# Patient Record
Sex: FEMALE | Race: WHITE | NOT HISPANIC OR LATINO | URBAN - METROPOLITAN AREA
[De-identification: names, ages, dates, MRNs, and addresses within clinical notes are randomized per-mention and may not be internally consistent; named-entity substitution may affect disease eponyms.]

---

## 2020-01-20 ENCOUNTER — EMERGENCY (EMERGENCY)
Facility: HOSPITAL | Age: 30
LOS: 1 days | Discharge: HOME | End: 2020-01-20
Attending: INTERNAL MEDICINE | Admitting: STUDENT IN AN ORGANIZED HEALTH CARE EDUCATION/TRAINING PROGRAM
Payer: MEDICAID

## 2020-01-20 VITALS
SYSTOLIC BLOOD PRESSURE: 131 MMHG | WEIGHT: 210.1 LBS | TEMPERATURE: 98 F | RESPIRATION RATE: 18 BRPM | HEART RATE: 86 BPM | HEIGHT: 62 IN | OXYGEN SATURATION: 99 % | DIASTOLIC BLOOD PRESSURE: 80 MMHG

## 2020-01-20 DIAGNOSIS — Z90.49 ACQUIRED ABSENCE OF OTHER SPECIFIED PARTS OF DIGESTIVE TRACT: Chronic | ICD-10-CM

## 2020-01-20 LAB
ALBUMIN SERPL ELPH-MCNC: 3.9 G/DL — SIGNIFICANT CHANGE UP (ref 3.3–5)
ALP SERPL-CCNC: 44 U/L — SIGNIFICANT CHANGE UP (ref 40–120)
ALT FLD-CCNC: 40 U/L — SIGNIFICANT CHANGE UP (ref 10–45)
ANION GAP SERPL CALC-SCNC: 11 MMOL/L — SIGNIFICANT CHANGE UP (ref 5–17)
AST SERPL-CCNC: 52 U/L — HIGH (ref 10–40)
BASOPHILS # BLD AUTO: 0.01 K/UL — SIGNIFICANT CHANGE UP (ref 0–0.2)
BASOPHILS NFR BLD AUTO: 0.1 % — SIGNIFICANT CHANGE UP (ref 0–2)
BILIRUB SERPL-MCNC: 0.8 MG/DL — SIGNIFICANT CHANGE UP (ref 0.2–1.2)
BUN SERPL-MCNC: 10 MG/DL — SIGNIFICANT CHANGE UP (ref 7–23)
CALCIUM SERPL-MCNC: 8.6 MG/DL — SIGNIFICANT CHANGE UP (ref 8.4–10.5)
CHLORIDE SERPL-SCNC: 104 MMOL/L — SIGNIFICANT CHANGE UP (ref 96–108)
CO2 SERPL-SCNC: 22 MMOL/L — SIGNIFICANT CHANGE UP (ref 22–31)
CREAT SERPL-MCNC: 0.82 MG/DL — SIGNIFICANT CHANGE UP (ref 0.5–1.3)
EOSINOPHIL # BLD AUTO: 0 K/UL — SIGNIFICANT CHANGE UP (ref 0–0.5)
EOSINOPHIL NFR BLD AUTO: 0 % — SIGNIFICANT CHANGE UP (ref 0–6)
GLUCOSE SERPL-MCNC: 123 MG/DL — HIGH (ref 70–99)
HCT VFR BLD CALC: 44.7 % — SIGNIFICANT CHANGE UP (ref 34.5–45)
HGB BLD-MCNC: 15.7 G/DL — HIGH (ref 11.5–15.5)
IMM GRANULOCYTES NFR BLD AUTO: 0.4 % — SIGNIFICANT CHANGE UP (ref 0–1.5)
LYMPHOCYTES # BLD AUTO: 0.68 K/UL — LOW (ref 1–3.3)
LYMPHOCYTES # BLD AUTO: 7.5 % — LOW (ref 13–44)
MCHC RBC-ENTMCNC: 32.2 PG — SIGNIFICANT CHANGE UP (ref 27–34)
MCHC RBC-ENTMCNC: 35.1 GM/DL — SIGNIFICANT CHANGE UP (ref 32–36)
MCV RBC AUTO: 91.6 FL — SIGNIFICANT CHANGE UP (ref 80–100)
MONOCYTES # BLD AUTO: 0.05 K/UL — SIGNIFICANT CHANGE UP (ref 0–0.9)
MONOCYTES NFR BLD AUTO: 0.5 % — LOW (ref 2–14)
NEUTROPHILS # BLD AUTO: 8.32 K/UL — HIGH (ref 1.8–7.4)
NEUTROPHILS NFR BLD AUTO: 91.5 % — HIGH (ref 43–77)
NRBC # BLD: 0 /100 WBCS — SIGNIFICANT CHANGE UP (ref 0–0)
PLATELET # BLD AUTO: 191 K/UL — SIGNIFICANT CHANGE UP (ref 150–400)
POTASSIUM SERPL-MCNC: 4.1 MMOL/L — SIGNIFICANT CHANGE UP (ref 3.5–5.3)
POTASSIUM SERPL-SCNC: 4.1 MMOL/L — SIGNIFICANT CHANGE UP (ref 3.5–5.3)
PROT SERPL-MCNC: 7.3 G/DL — SIGNIFICANT CHANGE UP (ref 6–8.3)
RBC # BLD: 4.88 M/UL — SIGNIFICANT CHANGE UP (ref 3.8–5.2)
RBC # FLD: 11.8 % — SIGNIFICANT CHANGE UP (ref 10.3–14.5)
SODIUM SERPL-SCNC: 137 MMOL/L — SIGNIFICANT CHANGE UP (ref 135–145)
WBC # BLD: 9.1 K/UL — SIGNIFICANT CHANGE UP (ref 3.8–10.5)
WBC # FLD AUTO: 9.1 K/UL — SIGNIFICANT CHANGE UP (ref 3.8–10.5)

## 2020-01-20 PROCEDURE — 99285 EMERGENCY DEPT VISIT HI MDM: CPT

## 2020-01-20 PROCEDURE — 99218: CPT

## 2020-01-20 PROCEDURE — 72131 CT LUMBAR SPINE W/O DYE: CPT | Mod: 26

## 2020-01-20 PROCEDURE — 93010 ELECTROCARDIOGRAM REPORT: CPT

## 2020-01-20 PROCEDURE — 72110 X-RAY EXAM L-2 SPINE 4/>VWS: CPT | Mod: 26

## 2020-01-20 RX ORDER — ACETAMINOPHEN 500 MG
650 TABLET ORAL EVERY 6 HOURS
Refills: 0 | Status: DISCONTINUED | OUTPATIENT
Start: 2020-01-20 | End: 2020-02-06

## 2020-01-20 RX ORDER — LIDOCAINE 4 G/100G
1 CREAM TOPICAL ONCE
Refills: 0 | Status: COMPLETED | OUTPATIENT
Start: 2020-01-20 | End: 2020-01-20

## 2020-01-20 RX ORDER — TRAMADOL HYDROCHLORIDE 50 MG/1
50 TABLET ORAL EVERY 4 HOURS
Refills: 0 | Status: DISCONTINUED | OUTPATIENT
Start: 2020-01-20 | End: 2020-01-21

## 2020-01-20 RX ORDER — POLYETHYLENE GLYCOL 3350 17 G/17G
17 POWDER, FOR SOLUTION ORAL DAILY
Refills: 0 | Status: DISCONTINUED | OUTPATIENT
Start: 2020-01-20 | End: 2020-02-06

## 2020-01-20 RX ORDER — SODIUM CHLORIDE 9 MG/ML
1000 INJECTION INTRAMUSCULAR; INTRAVENOUS; SUBCUTANEOUS ONCE
Refills: 0 | Status: COMPLETED | OUTPATIENT
Start: 2020-01-20 | End: 2020-01-20

## 2020-01-20 RX ORDER — LACTULOSE 10 G/15ML
30 SOLUTION ORAL
Qty: 210 | Refills: 0
Start: 2020-01-20 | End: 2020-01-26

## 2020-01-20 RX ORDER — DIAZEPAM 5 MG
1 TABLET ORAL
Qty: 9 | Refills: 0
Start: 2020-01-20 | End: 2020-01-22

## 2020-01-20 RX ORDER — CYCLOBENZAPRINE HYDROCHLORIDE 10 MG/1
10 TABLET, FILM COATED ORAL THREE TIMES A DAY
Refills: 0 | Status: DISCONTINUED | OUTPATIENT
Start: 2020-01-20 | End: 2020-02-06

## 2020-01-20 RX ORDER — KETOROLAC TROMETHAMINE 30 MG/ML
30 SYRINGE (ML) INJECTION ONCE
Refills: 0 | Status: DISCONTINUED | OUTPATIENT
Start: 2020-01-20 | End: 2020-01-20

## 2020-01-20 RX ORDER — LIDOCAINE 4 G/100G
1 CREAM TOPICAL DAILY
Refills: 0 | Status: DISCONTINUED | OUTPATIENT
Start: 2020-01-20 | End: 2020-02-06

## 2020-01-20 RX ORDER — OXYCODONE AND ACETAMINOPHEN 5; 325 MG/1; MG/1
1 TABLET ORAL
Qty: 9 | Refills: 0
Start: 2020-01-20 | End: 2020-01-22

## 2020-01-20 RX ORDER — OXYCODONE AND ACETAMINOPHEN 5; 325 MG/1; MG/1
2 TABLET ORAL ONCE
Refills: 0 | Status: DISCONTINUED | OUTPATIENT
Start: 2020-01-20 | End: 2020-01-20

## 2020-01-20 RX ORDER — DEXAMETHASONE 0.5 MG/5ML
10 ELIXIR ORAL ONCE
Refills: 0 | Status: COMPLETED | OUTPATIENT
Start: 2020-01-20 | End: 2020-01-20

## 2020-01-20 RX ORDER — FENTANYL CITRATE 50 UG/ML
1 INJECTION INTRAVENOUS
Refills: 0 | Status: COMPLETED | OUTPATIENT
Start: 2020-01-20 | End: 2020-01-27

## 2020-01-20 RX ORDER — ENOXAPARIN SODIUM 100 MG/ML
40 INJECTION SUBCUTANEOUS DAILY
Refills: 0 | Status: DISCONTINUED | OUTPATIENT
Start: 2020-01-20 | End: 2020-02-06

## 2020-01-20 RX ORDER — HYDROMORPHONE HYDROCHLORIDE 2 MG/ML
1 INJECTION INTRAMUSCULAR; INTRAVENOUS; SUBCUTANEOUS ONCE
Refills: 0 | Status: DISCONTINUED | OUTPATIENT
Start: 2020-01-20 | End: 2020-01-20

## 2020-01-20 RX ORDER — DIAZEPAM 5 MG
5 TABLET ORAL ONCE
Refills: 0 | Status: DISCONTINUED | OUTPATIENT
Start: 2020-01-20 | End: 2020-01-20

## 2020-01-20 RX ORDER — SENNA PLUS 8.6 MG/1
2 TABLET ORAL AT BEDTIME
Refills: 0 | Status: DISCONTINUED | OUTPATIENT
Start: 2020-01-20 | End: 2020-02-06

## 2020-01-20 RX ORDER — MORPHINE SULFATE 50 MG/1
2 CAPSULE, EXTENDED RELEASE ORAL EVERY 4 HOURS
Refills: 0 | Status: DISCONTINUED | OUTPATIENT
Start: 2020-01-20 | End: 2020-01-20

## 2020-01-20 RX ORDER — PANTOPRAZOLE SODIUM 20 MG/1
40 TABLET, DELAYED RELEASE ORAL
Refills: 0 | Status: DISCONTINUED | OUTPATIENT
Start: 2020-01-20 | End: 2020-02-06

## 2020-01-20 RX ADMIN — CYCLOBENZAPRINE HYDROCHLORIDE 10 MILLIGRAM(S): 10 TABLET, FILM COATED ORAL at 22:50

## 2020-01-20 RX ADMIN — HYDROMORPHONE HYDROCHLORIDE 1 MILLIGRAM(S): 2 INJECTION INTRAMUSCULAR; INTRAVENOUS; SUBCUTANEOUS at 17:17

## 2020-01-20 RX ADMIN — Medication 30 MILLIGRAM(S): at 19:01

## 2020-01-20 RX ADMIN — LIDOCAINE 1 PATCH: 4 CREAM TOPICAL at 19:54

## 2020-01-20 RX ADMIN — LIDOCAINE 1 PATCH: 4 CREAM TOPICAL at 14:09

## 2020-01-20 RX ADMIN — FENTANYL CITRATE 1 PATCH: 50 INJECTION INTRAVENOUS at 21:31

## 2020-01-20 RX ADMIN — SODIUM CHLORIDE 1000 MILLILITER(S): 9 INJECTION INTRAMUSCULAR; INTRAVENOUS; SUBCUTANEOUS at 19:42

## 2020-01-20 RX ADMIN — HYDROMORPHONE HYDROCHLORIDE 1 MILLIGRAM(S): 2 INJECTION INTRAMUSCULAR; INTRAVENOUS; SUBCUTANEOUS at 16:53

## 2020-01-20 RX ADMIN — OXYCODONE AND ACETAMINOPHEN 2 TABLET(S): 5; 325 TABLET ORAL at 14:29

## 2020-01-20 RX ADMIN — Medication 5 MILLIGRAM(S): at 14:08

## 2020-01-20 RX ADMIN — Medication 10 MILLIGRAM(S): at 14:07

## 2020-01-20 RX ADMIN — SENNA PLUS 2 TABLET(S): 8.6 TABLET ORAL at 22:49

## 2020-01-20 RX ADMIN — OXYCODONE AND ACETAMINOPHEN 2 TABLET(S): 5; 325 TABLET ORAL at 15:02

## 2020-01-20 RX ADMIN — Medication 30 MILLIGRAM(S): at 18:31

## 2020-01-20 RX ADMIN — Medication 24 MILLIGRAM(S): at 22:49

## 2020-01-20 RX ADMIN — Medication 5 MILLIGRAM(S): at 16:03

## 2020-01-20 NOTE — ED ADULT NURSE NOTE - OBJECTIVE STATEMENT
Pt presents to ED from home with c/o right mid back pain that radiates down her right leg that started last night at 9pm. Pt states this has happened to her in the past, unsure of how it began. 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 She said she was opening a bottle last night and that's when she felt the pain. She reports tingling in her right leg, denies numbness. She is unable to ambulate, but can move the right leg. She took a total of 1600mg of advil today, last dose of 800mg at 1200.

## 2020-01-20 NOTE — ED PROVIDER NOTE - CLINICAL SUMMARY MEDICAL DECISION MAKING FREE TEXT BOX
29 y.o F with PMH of right sided sciatica presents to the ED with c/o right sided back pain radiating down the posterior right leg x last night. 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 Reports last night she felt a spasm in the lower back, this morning any movement causes pain. She took 1600mg of aleve so far today with no relief in pain. She denies direct trauma, bowel/bladder incontinence, abd pain, n/v, f/c, urinary symptoms or all other complaints. PE as noted above. lumbar xray pending, pain control, reassess 29 y.o F with PMH of right sided sciatica presents to the ED with c/o right sided back pain radiating down the posterior right leg x last night. 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 Reports last night she felt a spasm in the lower back, this morning any movement causes pain. She took 1600mg of aleve so far today with no relief in pain. She denies direct trauma, bowel/bladder incontinence, abd pain, n/v, f/c, urinary symptoms or all other complaints. PE as noted above. lumbar xray negative. pain control, reassess

## 2020-01-20 NOTE — H&P ADULT - ASSESSMENT
29 F with acute on chronic R lumbar radiculopathy.     # R lumbar radiculopathy.   pain control.   trial of fentanyl patch 25mcg/72hrs.   Tramadol for moderate pain.   IV morphine if severe pain.   bowel regimen.   neuro consult.   DVT/GI proph.   cont home OCP. 29 F with acute on chronic R lumbar radiculopathy.     # R lumbar radiculopathy.   pain control.   trial of fentanyl patch 25mcg/72hrs.   Tramadol for moderate pain.   IV morphine if severe pain.   bowel regimen.   Medrol dose arsalan, muscle relaxer.   neuro consult.   DVT/GI proph.   cont home OCP. 29 F with acute on chronic R lumbar radiculopathy.     # R lumbar radiculopathy.   check CT lumbar spine  pain control.   trial of fentanyl patch 25mcg/72hrs.   Tramadol for moderate pain.   IV morphine if severe pain.   bowel regimen.   Medrol dose arsalan, muscle relaxer.   neuro consult.   DVT/GI proph.   cont home OCP.

## 2020-01-20 NOTE — ED PROVIDER NOTE - OBJECTIVE STATEMENT
29 y.o F with PMH of right sided sciatica presents to the ED with c/o right sided back pain radiating down the posterior right leg x last night. 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 Reports last night she felt a spasm in the lower back, this morning any movement causes pain. She took 1600mg of aleve so far today with no relief in pain. She denies direct trauma, bowel/bladder incontinence, abd pain, n/v, f/c, urinary symptoms or all other complaints

## 2020-01-20 NOTE — H&P ADULT - NSHPPHYSICALEXAM_GEN_ALL_CORE
Vital Signs Last 24 Hrs  T(C): 36.9 (20 Jan 2020 20:14), Max: 37 (20 Jan 2020 15:52)  T(F): 98.4 (20 Jan 2020 20:14), Max: 98.6 (20 Jan 2020 15:52)  HR: 70 (20 Jan 2020 20:14) (63 - 86)  BP: 116/68 (20 Jan 2020 20:14) (113/69 - 131/80)  BP(mean): --  RR: 16 (20 Jan 2020 20:14) (16 - 18)  SpO2: 97% (20 Jan 2020 20:14) (97% - 99%)  Daily Height in cm: 157.48 (20 Jan 2020 13:29)    Daily   CAPILLARY BLOOD GLUCOSE        I&O's Summary      GENERAL: NAD  HEAD:  Normocephalic  EYES: EOMI, PERRLA, conjunctiva and sclera clear  ENMT: No tonsillar erythema, exudates, or enlargement; Moist mucous membranes, No lesions  NECK: Supple, No JVD, no bruit, normal thyroid  NERVOUS SYSTEM:  Alert & Oriented X3, Good concentration; grossly  moves all fours. + R straight leg raise. 5/5 R ankle dorsi and plantar flexion. altered sensory on R anteromedial thigh and R lateral calf and R dorsolateral foot.   CHEST/LUNG: Clear to auscultation bilaterally; No rales, rhonchi, wheezing, or rubs  HEART: Regular rate and rhythm; No murmurs, rubs, or gallops  ABDOMEN: Soft, Nontender, Nondistended; Bowel sounds present  EXTREMITIES:  2+ Peripheral Pulses, No clubbing, cyanosis, or edema  LYMPH: No lymphadenopathy noted  SKIN: No rashes or lesions

## 2020-01-20 NOTE — ED PROVIDER NOTE - ATTENDING CONTRIBUTION TO CARE
29 y.o F with PMH of right sided sciatica presents to the ED with c/o right sided back pain radiating down the posterior right leg x last night. 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 Reports last night she felt a spasm in the lower back, this morning any movement causes pain. She took 1600mg of aleve so far today with no relief in pain. She denies direct trauma, bowel/bladder incontinence, abd pain, n/v, f/c, urinary symptoms or all other complaints. PE as noted above. lumbar xray negative. pain control, reassess  Dr. Hawkins:  I have reviewed and discussed with the PA/ resident the case specifics, including the history, physical assessment, evaluation, conclusion, laboratory results, and medical plan. I agree with the contents, and conclusions. I have personally examined, and interviewed the patient.

## 2020-01-20 NOTE — ED ADULT NURSE REASSESSMENT NOTE - NS ED NURSE REASSESS COMMENT FT1
Attempted to sit patient up in bed, pt crying in pain. TAMIKO Hernandez made aware. Repositioned for comfort.

## 2020-01-20 NOTE — ED ADULT NURSE REASSESSMENT NOTE - NS ED NURSE REASSESS COMMENT FT1
Pt still unable to sit or stand without pain. She does report no pain when laying flat, Dr Hawkins aware.

## 2020-01-20 NOTE — H&P ADULT - NSHPLABSRESULTS_GEN_ALL_CORE
15.7   9.10  )-----------( 191      ( 20 Jan 2020 19:30 )             44.7       01-20    137  |  104  |  10  ----------------------------<  123<H>  4.1   |  22  |  0.82    Ca    8.6      20 Jan 2020 19:30    TPro  7.3  /  Alb  3.9  /  TBili  0.8  /  DBili  x   /  AST  52<H>  /  ALT  40  /  AlkPhos  44  01-20         LIVER FUNCTIONS - ( 20 Jan 2020 19:30 )  Alb: 3.9 g/dL / Pro: 7.3 g/dL / ALK PHOS: 44 U/L / ALT: 40 U/L / AST: 52 U/L / GGT: x                   EKG: NSR at 63bpm, no st changes.       < from: Xray Lumbosacral Spine (01.20.20 @ 15:17) >    INTERPRETATION:  Lumbar spine with full pelvic view. Patient has low back pain. 4 images.    Lumbar curve is within normal limits. Vertebral and disc heights are well-preserved.    Lumbar spine and hips. Essentially free of degeneration.    No bone destruction or fracture is evident.    IMPRESSION: Unremarkable study.    < end of copied text >

## 2020-01-20 NOTE — CHART NOTE - NSCHARTNOTEFT_GEN_A_CORE
EXAM: CT LUMBAR SPINE     PROCEDURE DATE: 01/20/2020       INTERPRETATION:     CT lumbar spine without IV contrast     CLINICAL INFORMATION: Low Back pain, difficulty walking.     TECHNIQUE: Contiguous axial 2 mm sections were obtained through the lumbar   spine using a single helical acquisition. Additional 2 mm sagittal and   coronal reconstructions of the spine were obtained. These additional   reformatted images were used to evaluate the spine for alignment, vertebral   fractures and the integrity of the the posterior elements. This scan was   performed using automatic exposure control (radiation dose reduction   software) to obtain a diagnostic image quality scan with patient dose as low   as reasonably achievable.     FINDINGS: No prior similar studies are available for review     Lumbar vertebral body heights are maintained. No vertebral fracture is seen.   No destructive bone lesion is found. Alignment is preserved. Facet joints   appear aligned. The visualized sacral and pelvic bones appear intact.     Lumbar intervertebral disc spaces appear intact. Mild bulges noted at L4-5   thecal sac as well as a RIGHT paracentral extruded disc herniation measuring   1.4 cm which compresses the descending RIGHT L5 nerve root. Resulting   moderate central stenosis is noted at this level.     No paraspinal mass is recognized. Paraspinal soft tissues appear intact.       IMPRESSION: Mild bulges noted at L4-5 thecal sac as well as a RIGHT   paracentral extruded disc herniation measuring 1.4 cm which compresses the   descending RIGHT L5 nerve root. Resulting moderate central stenosis is noted   at this level.    No vertebral fracture is recognized.                       URIEL BARR M.D., ATTENDING RADIOLOGIST   This document has been electronically signed. Jan 20 2020 9:02PM

## 2020-01-20 NOTE — ED PROVIDER NOTE - PHYSICAL EXAMINATION
msk: no sensory or motor deficits on exam. +right SLR. +reproducible right para-lumbar muscle TTP. No midline spinal TTP

## 2020-01-20 NOTE — ED PROVIDER NOTE - CARE PLAN
Principal Discharge DX:	Acute right-sided low back pain with right-sided sciatica Principal Discharge DX:	Acute right-sided low back pain with right-sided sciatica  Secondary Diagnosis:	Ambulatory dysfunction

## 2020-01-20 NOTE — ED ADULT NURSE NOTE - NSIMPLEMENTINTERV_GEN_ALL_ED
Implemented All Fall with Harm Risk Interventions:  [place] to call system. Call bell, personal items and telephone within reach. Instruct patient to call for assistance. Room bathroom lighting operational. Non-slip footwear when patient is off stretcher. Physically safe environment: no spills, clutter or unnecessary equipment. Stretcher in lowest position, wheels locked, appropriate side rails in place. Provide visual cue, wrist band, yellow gown, etc. Monitor gait and stability. Monitor for mental status changes and reorient to person, place, and time. Review medications for side effects contributing to fall risk. Reinforce activity limits and safety measures with patient and family. Provide visual clues: red socks.

## 2020-01-20 NOTE — ED PROVIDER NOTE - NSFOLLOWUPINSTRUCTIONS_ED_ALL_ED_FT
1. Valium 5mg every 8 hours as needed for pain. Do not drink alcohol or drive on this med  2. Ibuprofen 400mg every 6 hours on full stomach as needed for pain  3. Lidocaine patches as needed for pain  4. Follow up with your primary care doctor in 1-2 days  5. Return to the ED for worsening pain, new numbness or tingling in legs, problems moving bowel or bladder or any concerns    Back Pain    Back pain is very common in adults. The cause of back pain is rarely dangerous and the pain often gets better over time. The cause of your back pain may not be known and may include strain of muscles or ligaments, degeneration of the spinal disks, or arthritis. Occasionally the pain may radiate down your leg(s). Over-the-counter medicines to reduce pain and inflammation are often the most helpful. Stretching and remaining active frequently helps the healing process.     SEEK IMMEDIATE MEDICAL CARE IF YOU HAVE ANY OF THE FOLLOWING SYMPTOMS: bowel or bladder control problems, unusual weakness or numbness in your arms or legs, nausea or vomiting, abdominal pain, fever, dizziness/lightheadedness.

## 2020-01-20 NOTE — ED ADULT TRIAGE NOTE - CHIEF COMPLAINT QUOTE
Pt c/o lower back pain radiating down right leg starting last night after uncorking a bottle. Pt states inability to walk. Pt able to transfer from private vehicle to stretcher. Pt states took 1600 mg Ibuprofen today without relief.

## 2020-01-20 NOTE — H&P ADULT - HISTORY OF PRESENT ILLNESS
29 F no sigPMHx pw R lumbar radiculopathy. Pt recalled a milder episode in 10/2019 that resolved after several weeks. She presented with acute onset of severe R lower lumbar back pain with radiation down R thigh to outside of R calf associated with severe muscle spasms and paresthesias down the R leg while trying to uncork a bottle last night in an awkward position. She took some ibuprofen last night and went to bed but this am woke up with severe pain and was unable to get out of bed. After taking a tot of 1600mg ibuprofen, she was assisted out of bed and came to ED. She had received IM dexamethasone 10mg, Dilaudid 1mg, 2 Percocet 5/325, total of 10mg Valium, toradol in ED for the last 6 hrs but still intractable pain. L/S Xray negative. She denied any fevers, chills, bowel or bladder issues. She has some weakness in the R leg and does not feel steady when walking.     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

## 2020-01-20 NOTE — ED PROVIDER NOTE - PROGRESS NOTE DETAILS
TAMIKO Jaeger: Patient's pain is not controlled, her back starts to spasm when she sits, unable to ambulate. Spoke with hospitalist, will admit to obs and obtain lumbar CT

## 2020-01-20 NOTE — H&P ADULT - NSHPREVIEWOFSYSTEMS_GEN_ALL_CORE
CONSTITUTIONAL: No fever, weight loss, or fatigue  EYES: No eye pain, visual disturbances, or discharge  ENMT:  No difficulty hearing, tinnitus, vertigo; No sinus or throat pain  NECK: No pain or stiffness  RESPIRATORY: No cough, wheezing, chills or hemoptysis; No shortness of breath  CARDIOVASCULAR: No chest pain, palpitations, dizziness, or leg swelling  GASTROINTESTINAL: No abdominal or epigastric pain. No nausea, vomiting, or hematemesis; No diarrhea or constipation. No melena or hematochezia.  GENITOURINARY: No dysuria, frequency, hematuria, or incontinence  NEUROLOGICAL: No headaches, memory loss, loss of strength, numbness, or tremors. as per HPI  SKIN: No itching, burning, rashes, or lesions   LYMPH NODES: No enlarged glands  ENDOCRINE: No heat or cold intolerance; No hair loss  MUSCULOSKELETAL: as per HPI  PSYCHIATRIC: No depression, anxiety, mood swings, or difficulty sleeping  HEME/LYMPH: No easy bruising, or bleeding gums  ALLERY AND IMMUNOLOGIC: No hives or eczema    IMPROVE VTE Individual Risk Assessment          RISK                                                          Points  [  ] Previous VTE                                                3  [  ] Thrombophilia                                             2  [  ] Lower limb paralysis                                   2        (unable to hold up >15 seconds)    [  ] Current Cancer                                             2         (within 6 months)  [  ] Immobilization > 24 hrs                              1  [  ] ICU/CCU stay > 24 hours                             1  [  ] Age > 60                                                         1    IMPROVE VTE Score:         [     0    ]    Total Risk Factor Score:    0 - 1:   Consider IPC  >2 - 3:  Thromboprophylaxis required (enoxaparin or SQ heparin)        >4:   High Risk: Thromboprophylaxis required (enoxaparin or SQ heparin), optional add IPC  **If CONTRAINDICATION to enoxaparin or SQ heparin, USE IPCs**

## 2020-01-21 ENCOUNTER — TRANSCRIPTION ENCOUNTER (OUTPATIENT)
Age: 30
End: 2020-01-21

## 2020-01-21 ENCOUNTER — APPOINTMENT (OUTPATIENT)
Dept: MRI IMAGING | Facility: HOSPITAL | Age: 30
End: 2020-01-21

## 2020-01-21 PROCEDURE — G0378: CPT

## 2020-01-21 PROCEDURE — 96374 THER/PROPH/DIAG INJ IV PUSH: CPT

## 2020-01-21 PROCEDURE — 99284 EMERGENCY DEPT VISIT MOD MDM: CPT

## 2020-01-21 PROCEDURE — 85027 COMPLETE CBC AUTOMATED: CPT

## 2020-01-21 PROCEDURE — 80053 COMPREHEN METABOLIC PANEL: CPT

## 2020-01-21 PROCEDURE — 93005 ELECTROCARDIOGRAM TRACING: CPT

## 2020-01-21 PROCEDURE — 72131 CT LUMBAR SPINE W/O DYE: CPT

## 2020-01-21 PROCEDURE — 72148 MRI LUMBAR SPINE W/O DYE: CPT

## 2020-01-21 PROCEDURE — 72148 MRI LUMBAR SPINE W/O DYE: CPT | Mod: 26

## 2020-01-21 PROCEDURE — 96376 TX/PRO/DX INJ SAME DRUG ADON: CPT

## 2020-01-21 PROCEDURE — 99284 EMERGENCY DEPT VISIT MOD MDM: CPT | Mod: 25

## 2020-01-21 PROCEDURE — 99217: CPT

## 2020-01-21 PROCEDURE — 96372 THER/PROPH/DIAG INJ SC/IM: CPT

## 2020-01-21 PROCEDURE — 72110 X-RAY EXAM L-2 SPINE 4/>VWS: CPT

## 2020-01-21 RX ORDER — GABAPENTIN 400 MG/1
300 CAPSULE ORAL THREE TIMES A DAY
Refills: 0 | Status: DISCONTINUED | OUTPATIENT
Start: 2020-01-21 | End: 2020-02-06

## 2020-01-21 RX ORDER — FENTANYL CITRATE 50 UG/ML
1 INJECTION INTRAVENOUS
Qty: 0 | Refills: 0 | DISCHARGE
Start: 2020-01-21

## 2020-01-21 RX ORDER — PANTOPRAZOLE SODIUM 20 MG/1
1 TABLET, DELAYED RELEASE ORAL
Qty: 0 | Refills: 0 | DISCHARGE
Start: 2020-01-21

## 2020-01-21 RX ORDER — SENNA PLUS 8.6 MG/1
2 TABLET ORAL
Qty: 0 | Refills: 0 | DISCHARGE
Start: 2020-01-21

## 2020-01-21 RX ORDER — POLYETHYLENE GLYCOL 3350 17 G/17G
17 POWDER, FOR SOLUTION ORAL
Qty: 0 | Refills: 0 | DISCHARGE
Start: 2020-01-21

## 2020-01-21 RX ORDER — LIDOCAINE 4 G/100G
1 CREAM TOPICAL
Qty: 0 | Refills: 0 | DISCHARGE
Start: 2020-01-21

## 2020-01-21 RX ORDER — ALPRAZOLAM 0.25 MG
0.25 TABLET ORAL ONCE
Refills: 0 | Status: DISCONTINUED | OUTPATIENT
Start: 2020-01-21 | End: 2020-01-21

## 2020-01-21 RX ORDER — ACETAMINOPHEN 500 MG
2 TABLET ORAL
Qty: 0 | Refills: 0 | DISCHARGE
Start: 2020-01-21

## 2020-01-21 RX ORDER — CYCLOBENZAPRINE HYDROCHLORIDE 10 MG/1
1 TABLET, FILM COATED ORAL
Qty: 0 | Refills: 0 | DISCHARGE
Start: 2020-01-21

## 2020-01-21 RX ORDER — TRAMADOL HYDROCHLORIDE 50 MG/1
1 TABLET ORAL
Qty: 0 | Refills: 0 | DISCHARGE
Start: 2020-01-21

## 2020-01-21 RX ORDER — DEXAMETHASONE 0.5 MG/5ML
4 ELIXIR ORAL EVERY 6 HOURS
Refills: 0 | Status: DISCONTINUED | OUTPATIENT
Start: 2020-01-21 | End: 2020-02-06

## 2020-01-21 RX ORDER — GABAPENTIN 400 MG/1
1 CAPSULE ORAL
Qty: 0 | Refills: 0 | DISCHARGE
Start: 2020-01-21

## 2020-01-21 RX ADMIN — FENTANYL CITRATE 1 PATCH: 50 INJECTION INTRAVENOUS at 07:27

## 2020-01-21 RX ADMIN — ENOXAPARIN SODIUM 40 MILLIGRAM(S): 100 INJECTION SUBCUTANEOUS at 16:09

## 2020-01-21 RX ADMIN — GABAPENTIN 300 MILLIGRAM(S): 400 CAPSULE ORAL at 16:09

## 2020-01-21 RX ADMIN — SENNA PLUS 2 TABLET(S): 8.6 TABLET ORAL at 21:30

## 2020-01-21 RX ADMIN — Medication 4 MILLIGRAM(S): at 13:49

## 2020-01-21 RX ADMIN — PANTOPRAZOLE SODIUM 40 MILLIGRAM(S): 20 TABLET, DELAYED RELEASE ORAL at 05:11

## 2020-01-21 RX ADMIN — TRAMADOL HYDROCHLORIDE 50 MILLIGRAM(S): 50 TABLET ORAL at 16:32

## 2020-01-21 RX ADMIN — TRAMADOL HYDROCHLORIDE 50 MILLIGRAM(S): 50 TABLET ORAL at 02:18

## 2020-01-21 RX ADMIN — LIDOCAINE 1 PATCH: 4 CREAM TOPICAL at 16:09

## 2020-01-21 RX ADMIN — CYCLOBENZAPRINE HYDROCHLORIDE 10 MILLIGRAM(S): 10 TABLET, FILM COATED ORAL at 02:18

## 2020-01-21 RX ADMIN — CYCLOBENZAPRINE HYDROCHLORIDE 10 MILLIGRAM(S): 10 TABLET, FILM COATED ORAL at 13:39

## 2020-01-21 RX ADMIN — TRAMADOL HYDROCHLORIDE 50 MILLIGRAM(S): 50 TABLET ORAL at 21:30

## 2020-01-21 RX ADMIN — Medication 4 MILLIGRAM(S): at 05:11

## 2020-01-21 RX ADMIN — FENTANYL CITRATE 1 PATCH: 50 INJECTION INTRAVENOUS at 18:27

## 2020-01-21 RX ADMIN — CYCLOBENZAPRINE HYDROCHLORIDE 10 MILLIGRAM(S): 10 TABLET, FILM COATED ORAL at 16:33

## 2020-01-21 RX ADMIN — Medication 0.25 MILLIGRAM(S): at 13:30

## 2020-01-21 RX ADMIN — Medication 4 MILLIGRAM(S): at 18:24

## 2020-01-21 RX ADMIN — TRAMADOL HYDROCHLORIDE 50 MILLIGRAM(S): 50 TABLET ORAL at 03:00

## 2020-01-21 RX ADMIN — GABAPENTIN 300 MILLIGRAM(S): 400 CAPSULE ORAL at 21:30

## 2020-01-21 RX ADMIN — TRAMADOL HYDROCHLORIDE 50 MILLIGRAM(S): 50 TABLET ORAL at 17:00

## 2020-01-21 NOTE — DISCHARGE NOTE PROVIDER - HOSPITAL COURSE
Hospital Course    29y Female with PMH of  low back pain presents with low Back pain and weakness and numbness down right leg, pt had CT of spine and MRI spine     pt to be transferred to [place] post MRI     Source of Infection:    Antibiotic / Last Day: NA        Palliative Care / Advanced Care Planning    Code Status:full Code    Patient/Family agreeable to Hospice/Palliative (Y/N) NA     Summary of Goals of Care Conversation:NA        Discharging Provider:  Pao Velazquez    Contact Info: Cell [phone] - Please call with any questions or concerns.        Outpatient Provider: Dr Colby Balbuena Hospital Course    29y Female with PMH of  low back pain presents with low Back pain and weakness and numbness down right leg, pt had CT of spine and MRI spine     pt to be transferred to [place] post MRI  for further evalustion and work up     Source of Infection:    Antibiotic / Last Day: NA        Palliative Care / Advanced Care Planning    Code Status:full Code    Patient/Family agreeable to Hospice/Palliative (Y/N) NA     Summary of Goals of Care Conversation:NA        Discharging Provider:  Pao Velazquez    Contact Info: Cell [phone] - Please call with any questions or concerns.        Outpatient Provider: Dr Colby Balbuena Hospital Course    29y Female with PMH of  low back pain presents with low Back pain and weakness and numbness down right leg, pt had CT of spine and MRI spine     pt to be transferred to [place] post MRI  for further evalustion and work up     Source of Infection:    Antibiotic / Last Day: NA        Palliative Care / Advanced Care Planning    Code Status:full Code    Patient/Family agreeable to Hospice/Palliative (Y/N) NA     Summary of Goals of Care Conversation:NA        Discharging Provider:  Pao Velazquez    Contact Info: Cell [phone] - Please call with any questions or concerns.        Outpatient Provider: Dr Colby Balbuena -contacted

## 2020-01-21 NOTE — CONSULT NOTE ADULT - SUBJECTIVE AND OBJECTIVE BOX
29 W who has history of low back pain who went to [facility] in Oct for back pain with radiation. AT the time, no imagine was provided and she was sent for physical therapy. She states her symptoms improved. 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 Yesterday, she had acute worsening of her back pain with pain radiating from the low back to the buttock, to the lateral thigh and mid calf. She has pain that is worse when she stands. She denies any urinary or fecal incontinence.    Vital Signs Last 24 Hrs  T(C): 36.9 (21 Jan 2020 07:04), Max: 37 (20 Jan 2020 15:52)  T(F): 98.5 (21 Jan 2020 07:04), Max: 98.6 (20 Jan 2020 15:52)  HR: 66 (21 Jan 2020 07:04) (63 - 86)  BP: 123/66 (21 Jan 2020 07:04) (113/69 - 139/77)  BP(mean): --  RR: 18 (21 Jan 2020 07:04) (16 - 18)  SpO2: 97% (21 Jan 2020 07:04) (97% - 100%)    Gen: no distress  HEENT: face sym  abd soft  ext no edema    MS: alert, oriented X3 speech fluent language intact.  CN: face sym, tup midline  motor: 5/5 in lower extremity when she is supine. However, there is positive straight leg raise on the right.   sensory intact lt    MEDICATIONS  (STANDING):  dexAMETHasone  Injectable 4 milliGRAM(s) IV Push every 6 hours  enoxaparin Injectable 40 milliGRAM(s) SubCutaneous daily  fentaNYL   Patch  25 MICROgram(s)/Hr 1 Patch Transdermal every 72 hours  gabapentin 300 milliGRAM(s) Oral three times a day  lidocaine   Patch 1 Patch Transdermal daily  pantoprazole    Tablet 40 milliGRAM(s) Oral before breakfast  senna 2 Tablet(s) Oral at bedtime    MEDICATIONS  (PRN):  acetaminophen   Tablet .. 650 milliGRAM(s) Oral every 6 hours PRN Temp greater or equal to 38C (100.4F), Mild Pain (1 - 3)  cyclobenzaprine 10 milliGRAM(s) Oral three times a day PRN Muscle Spasm  morphine  - Injectable 2 milliGRAM(s) IV Push every 4 hours PRN Severe Pain (7 - 10)  polyethylene glycol 3350 17 Gram(s) Oral daily PRN Constipation  traMADol 50 milliGRAM(s) Oral every 4 hours PRN Moderate Pain (4 - 6)      labs reviewed.   IMPRESSION:  Mild bulges noted at L4-5 thecal sac as well as a RIGHT paracentral extruded disc herniation measuring 1.4 cm which compresses the descending RIGHT L5 nerve root. Resulting moderate central stenosis is noted at this level.     No vertebral fracture is recognized.

## 2020-01-21 NOTE — PROGRESS NOTE ADULT - ASSESSMENT
29 F with acute on chronic R lumbar radiculopathy.     # R lumbar radiculopathy.    -CT lumbar spine- as above   -pain control  trial of fentanyl patch 25mcg/72hrs.   Tramadol for moderate pain.   IV morphine if severe pain.   bowel regimen.   Medrol dose arsalan, muscle relaxer.   neuro consult.   ortho consult   DVT/GI proph.   cont home OCP. 29 F with acute on chronic R lumbar radiculopathy. Unable to ambulate     # R lumbar radiculopathy.    -CT lumbar spine- as above   -pain control:  continue  fentanyl patch 25mcg/72hrs,  Tramadol for moderate pain. IV morphine if severe pain.    -medrol dose arsalan, flexeril for spasm    -neuro consult recommands transfer for ortho spine evaluation and possible surgery    -transfer to [place] for ortho spine eval and mri     #Constipation   bowel regimen pt has not had bm in 2 days - miralax 17gm daily   Medrol dose arsalan, muscle relaxer.   neuro consult.   ortho consult     #DVT/GI proph.

## 2020-01-21 NOTE — CONSULT NOTE ADULT - ASSESSMENT
29 W who is here for acute low back pain exacerbation. She has symptoms localizing to the RIght L4-S1 region. Will recommend transfer to [facility] for MRI of back along with neurosurgical consultation. (Neurosurgery is not available at [place]). Case was discussed with Dr. Perry.

## 2020-01-21 NOTE — PROGRESS NOTE ADULT - ATTENDING COMMENTS
I have personally seen and examined patient on the above date.  I discussed the case with Ms. Lea and I agree with findings and plan as detailed per note above, which I have amended where appropriate.

## 2020-01-21 NOTE — DISCHARGE NOTE PROVIDER - NSDCMRMEDTOKEN_GEN_ALL_CORE_FT
Ortho Tri-Cyclen Lo oral tablet: 1 tab(s) orally once a day acetaminophen 325 mg oral tablet: 2 tab(s) orally every 6 hours, As needed, Temp greater or equal to 38C (100.4F), Mild Pain (1 - 3)  cyclobenzaprine 10 mg oral tablet: 1 tab(s) orally 3 times a day, As needed, Muscle Spasm  fentaNYL 25 mcg/hr transdermal film, extended release: 1 patch transdermal every 72 hours  gabapentin 300 mg oral capsule: 1 cap(s) orally 3 times a day  lidocaine 5% topical film: Apply topically to affected area once a day  Ortho Tri-Cyclen Lo oral tablet: 1 tab(s) orally once a day  pantoprazole 40 mg oral delayed release tablet: 1 tab(s) orally once a day (before a meal)  polyethylene glycol 3350 oral powder for reconstitution: 17 gram(s) orally once a day, As needed, Constipation  senna oral tablet: 2 tab(s) orally once a day (at bedtime)  traMADol 50 mg oral tablet: 1 tab(s) orally every 4 hours, As needed, Moderate Pain (4 - 6)

## 2020-01-21 NOTE — DISCHARGE NOTE PROVIDER - NSDCCPCAREPLAN_GEN_ALL_CORE_FT
PRINCIPAL DISCHARGE DIAGNOSIS  Diagnosis: Acute right-sided low back pain with right-sided sciatica  Assessment and Plan of Treatment:       SECONDARY DISCHARGE DIAGNOSES  Diagnosis: Acute right lumbar radiculopathy  Assessment and Plan of Treatment:     Diagnosis: Lumbar radiculopathy, right  Assessment and Plan of Treatment:     Diagnosis: Ambulatory dysfunction  Assessment and Plan of Treatment: PRINCIPAL DISCHARGE DIAGNOSIS  Diagnosis: Acute right-sided low back pain with right-sided sciatica  Assessment and Plan of Treatment: pt getting MRI then Transfer to [place] for spine surgery eval      SECONDARY DISCHARGE DIAGNOSES  Diagnosis: Acute right lumbar radiculopathy  Assessment and Plan of Treatment:     Diagnosis: Lumbar radiculopathy, right  Assessment and Plan of Treatment:     Diagnosis: Ambulatory dysfunction  Assessment and Plan of Treatment:

## 2020-01-21 NOTE — PROGRESS NOTE ADULT - SUBJECTIVE AND OBJECTIVE BOX
Patient is a 29y old  Female who presents with a chief complaint of intractable back pain (20 Jan 2020 20:21)      Patient seen and examined at bedside.    ALLERGIES:  No Known Allergies    MEDICATIONS  (STANDING):  enoxaparin Injectable 40 milliGRAM(s) SubCutaneous daily  fentaNYL   Patch  25 MICROgram(s)/Hr 1 Patch Transdermal every 72 hours  lidocaine   Patch 1 Patch Transdermal daily  methylPREDNISolone   Oral   methylPREDNISolone 4 milliGRAM(s) Oral before breakfast  methylPREDNISolone 4 milliGRAM(s) Oral after lunch  methylPREDNISolone 4 milliGRAM(s) Oral after dinner  methylPREDNISolone 8 milliGRAM(s) Oral at bedtime  pantoprazole    Tablet 40 milliGRAM(s) Oral before breakfast  senna 2 Tablet(s) Oral at bedtime    MEDICATIONS  (PRN):  acetaminophen   Tablet .. 650 milliGRAM(s) Oral every 6 hours PRN Temp greater or equal to 38C (100.4F), Mild Pain (1 - 3)  cyclobenzaprine 10 milliGRAM(s) Oral three times a day PRN Muscle Spasm  morphine  - Injectable 2 milliGRAM(s) IV Push every 4 hours PRN Severe Pain (7 - 10)  polyethylene glycol 3350 17 Gram(s) Oral daily PRN Constipation  traMADol 50 milliGRAM(s) Oral every 4 hours PRN Moderate Pain (4 - 6)    Vital Signs Last 24 Hrs  T(F): 98 (20 Jan 2020 21:15), Max: 98.6 (20 Jan 2020 15:52)  HR: 67 (20 Jan 2020 21:15) (63 - 86)  BP: 139/77 (20 Jan 2020 21:15) (113/69 - 139/77)  RR: 18 (20 Jan 2020 21:15) (16 - 18)  SpO2: 100% (20 Jan 2020 21:15) (97% - 100%)  I&O's Summary    PHYSICAL EXAM:  General: NAD, A/O x 3  ENT: MMM  Neck: Supple, No JVD  Lungs: Clear to auscultation bilaterally, Non labored breathing   Cardio: RRR, S1/S2, No murmurs  Abdomen: Soft, Nontender, Nondistended; Bowel sounds present  Extremities: No calf tenderness, No pitting edema    LABS:                        15.7   9.10  )-----------( 191      ( 20 Jan 2020 19:30 )             44.7     01-20    137  |  104  |  10  ----------------------------<  123  4.1   |  22  |  0.82    Ca    8.6      20 Jan 2020 19:30    TPro  7.3  /  Alb  3.9  /  TBili  0.8  /  DBili  x   /  AST  52  /  ALT  40  /  AlkPhos  44  01-20    eGFR if Non African American: 97 mL/min/1.73M2 (01-20-20 @ 19:30)  eGFR if : 113 mL/min/1.73M2 (01-20-20 @ 19:30)                                  RADIOLOGY & ADDITIONAL TESTS:    Care Discussed with Consultants/Other Providers:     EXAM:  CT LUMBAR SPINE      PROCEDURE DATE:  01/20/2020        INTERPRETATION:      CT lumbar spine without IV contrast        CLINICAL INFORMATION: Low   Back pain, difficulty walking.    TECHNIQUE:  Contiguous axial 2 mm sections were obtained through the lumbar spine using a single helical acquisition.   Additional 2 mm sagittal and coronal reconstructions of the spine were obtained. These additional reformatted images were used to evaluate the spine for alignment, vertebral fractures and the integrity of the the posterior elements.  This scan was performed using automatic exposure control (radiation dose reduction software) to obtain a diagnostic image quality scan with patient dose as low as reasonably achievable.    FINDINGS:   No prior similar studies are available for review    Lumbar vertebral body heights are maintained. No vertebral fracture is seen. No destructive bone lesion is found.  Alignment is preserved.  Facet joints appear aligned.  The visualized sacral and pelvic bones appear intact.    Lumbar intervertebral disc spaces appear intact. Mild bulges noted at L4-5 thecal sac as well as a RIGHT paracentral extruded disc herniation measuring 1.4 cm which compresses the descending RIGHT L5 nerve root. Resulting moderate central stenosis is noted at this level.    No paraspinal mass is recognized.  Paraspinal soft tissues appear intact.      IMPRESSION:  Mild bulges noted at L4-5 thecal sac as well as a RIGHT paracentral extruded disc herniation measuring 1.4 cm which compresses the descending RIGHT L5 nerve root. Resulting moderate central stenosis is noted at this level.     No vertebral fracture is recognized.    URIEL BARR M.D., ATTENDING RADIOLOGIST  This document has been electronically signed. Jan 20 2020  9:02PM Patient is a 29y old  Female who presents with a chief complaint of intractable back pain.  Pt states pain has been improved last pm once pt starts to move pain gets worse with numbness wnd pain down the exterior right leg.      Patient seen and examined at bedside.    ALLERGIES:  No Known Allergies    MEDICATIONS  (STANDING):  enoxaparin Injectable 40 milliGRAM(s) SubCutaneous daily  fentaNYL   Patch  25 MICROgram(s)/Hr 1 Patch Transdermal every 72 hours  lidocaine   Patch 1 Patch Transdermal daily  methylPREDNISolone   Oral   methylPREDNISolone 4 milliGRAM(s) Oral before breakfast  methylPREDNISolone 4 milliGRAM(s) Oral after lunch  methylPREDNISolone 4 milliGRAM(s) Oral after dinner  methylPREDNISolone 8 milliGRAM(s) Oral at bedtime  pantoprazole    Tablet 40 milliGRAM(s) Oral before breakfast  senna 2 Tablet(s) Oral at bedtime    MEDICATIONS  (PRN):  acetaminophen   Tablet .. 650 milliGRAM(s) Oral every 6 hours PRN Temp greater or equal to 38C (100.4F), Mild Pain (1 - 3)  cyclobenzaprine 10 milliGRAM(s) Oral three times a day PRN Muscle Spasm  morphine  - Injectable 2 milliGRAM(s) IV Push every 4 hours PRN Severe Pain (7 - 10)  polyethylene glycol 3350 17 Gram(s) Oral daily PRN Constipation  traMADol 50 milliGRAM(s) Oral every 4 hours PRN Moderate Pain (4 - 6)    Vital Signs Last 24 Hrs  T(F): 98 (20 Jan 2020 21:15), Max: 98.6 (20 Jan 2020 15:52)  HR: 67 (20 Jan 2020 21:15) (63 - 86)  BP: 139/77 (20 Jan 2020 21:15) (113/69 - 139/77)  RR: 18 (20 Jan 2020 21:15) (16 - 18)  SpO2: 100% (20 Jan 2020 21:15) (97% - 100%)  I&O's Summary    PHYSICAL EXAM:  General: NAD, A/O x 3 Obese  ENT: MMM  Neck: Supple, No JVD  Lungs: Clear to auscultation bilaterally, Non labored breathing   Cardio: RRR, S1/S2, No murmurs  Abdomen: Soft, Nontender, Nondistended; Bowel sounds present  Extremities: No calf tenderness, No pitting edema pt with postitive straight leg raise on right with decrease sensation     LABS:                        15.7   9.10  )-----------( 191      ( 20 Jan 2020 19:30 )             44.7     01-20    137  |  104  |  10  ----------------------------<  123  4.1   |  22  |  0.82    Ca    8.6      20 Jan 2020 19:30    TPro  7.3  /  Alb  3.9  /  TBili  0.8  /  DBili  x   /  AST  52  /  ALT  40  /  AlkPhos  44  01-20    eGFR if Non African American: 97 mL/min/1.73M2 (01-20-20 @ 19:30)  eGFR if : 113 mL/min/1.73M2 (01-20-20 @ 19:30)                                  RADIOLOGY & ADDITIONAL TESTS:    Care Discussed with Consultants/Other Providers:     EXAM:  CT LUMBAR SPINE      PROCEDURE DATE:  01/20/2020        INTERPRETATION:      CT lumbar spine without IV contrast        CLINICAL INFORMATION: Low   Back pain, difficulty walking.    TECHNIQUE:  Contiguous axial 2 mm sections were obtained through the lumbar spine using a single helical acquisition.   Additional 2 mm sagittal and coronal reconstructions of the spine were obtained. These additional reformatted images were used to evaluate the spine for alignment, vertebral fractures and the integrity of the the posterior elements.  This scan was performed using automatic exposure control (radiation dose reduction software) to obtain a diagnostic image quality scan with patient dose as low as reasonably achievable.    FINDINGS:   No prior similar studies are available for review    Lumbar vertebral body heights are maintained. No vertebral fracture is seen. No destructive bone lesion is found.  Alignment is preserved.  Facet joints appear aligned.  The visualized sacral and pelvic bones appear intact.    Lumbar intervertebral disc spaces appear intact. Mild bulges noted at L4-5 thecal sac as well as a RIGHT paracentral extruded disc herniation measuring 1.4 cm which compresses the descending RIGHT L5 nerve root. Resulting moderate central stenosis is noted at this level.    No paraspinal mass is recognized.  Paraspinal soft tissues appear intact.      IMPRESSION:  Mild bulges noted at L4-5 thecal sac as well as a RIGHT paracentral extruded disc herniation measuring 1.4 cm which compresses the descending RIGHT L5 nerve root. Resulting moderate central stenosis is noted at this level.     No vertebral fracture is recognized.    URIEL BARR M.D., ATTENDING RADIOLOGIST  This document has been electronically signed. Jan 20 2020  9:02PM Patient is a 29y old  Female who presents with a chief complaint of intractable back pain.  Pt states pain has been improved last pm once pt starts to move pain gets worse with numbness wnd pain down the exterior right leg.      Patient seen and examined at bedside.    ALLERGIES:  No Known Allergies    MEDICATIONS  (STANDING):  enoxaparin Injectable 40 milliGRAM(s) SubCutaneous daily  fentaNYL   Patch  25 MICROgram(s)/Hr 1 Patch Transdermal every 72 hours  lidocaine   Patch 1 Patch Transdermal daily  methylPREDNISolone   Oral   methylPREDNISolone 4 milliGRAM(s) Oral before breakfast  methylPREDNISolone 4 milliGRAM(s) Oral after lunch  methylPREDNISolone 4 milliGRAM(s) Oral after dinner  methylPREDNISolone 8 milliGRAM(s) Oral at bedtime  pantoprazole    Tablet 40 milliGRAM(s) Oral before breakfast  senna 2 Tablet(s) Oral at bedtime    MEDICATIONS  (PRN):  acetaminophen   Tablet .. 650 milliGRAM(s) Oral every 6 hours PRN Temp greater or equal to 38C (100.4F), Mild Pain (1 - 3)  cyclobenzaprine 10 milliGRAM(s) Oral three times a day PRN Muscle Spasm  morphine  - Injectable 2 milliGRAM(s) IV Push every 4 hours PRN Severe Pain (7 - 10)  polyethylene glycol 3350 17 Gram(s) Oral daily PRN Constipation  traMADol 50 milliGRAM(s) Oral every 4 hours PRN Moderate Pain (4 - 6)    Vital Signs Last 24 Hrs  T(F): 98 (20 Jan 2020 21:15), Max: 98.6 (20 Jan 2020 15:52)  HR: 67 (20 Jan 2020 21:15) (63 - 86)  BP: 139/77 (20 Jan 2020 21:15) (113/69 - 139/77)  RR: 18 (20 Jan 2020 21:15) (16 - 18)  SpO2: 100% (20 Jan 2020 21:15) (97% - 100%)  I&O's Summary    PHYSICAL EXAM:  General: NAD, A/O x 3 Obese  ENT: MMM  Neck: Supple, No JVD  Lungs: Clear to auscultation bilaterally, Non labored breathing   Cardio: RRR, S1/S2, No murmurs  Abdomen: Soft, Nontender, Nondistended; Bowel sounds present  Extremities: No calf tenderness, No pitting edema pt with postitive straight leg raise on right with decrease sensation of prone portion of shin    LABS:                        15.7   9.10  )-----------( 191      ( 20 Jan 2020 19:30 )             44.7     01-20    137  |  104  |  10  ----------------------------<  123  4.1   |  22  |  0.82    Ca    8.6      20 Jan 2020 19:30    TPro  7.3  /  Alb  3.9  /  TBili  0.8  /  DBili  x   /  AST  52  /  ALT  40  /  AlkPhos  44  01-20    eGFR if Non African American: 97 mL/min/1.73M2 (01-20-20 @ 19:30)  eGFR if : 113 mL/min/1.73M2 (01-20-20 @ 19:30)                                  RADIOLOGY & ADDITIONAL TESTS:    Care Discussed with Consultants/Other Providers:     EXAM:  CT LUMBAR SPINE      PROCEDURE DATE:  01/20/2020        INTERPRETATION:      CT lumbar spine without IV contrast        CLINICAL INFORMATION: Low   Back pain, difficulty walking.    TECHNIQUE:  Contiguous axial 2 mm sections were obtained through the lumbar spine using a single helical acquisition.   Additional 2 mm sagittal and coronal reconstructions of the spine were obtained. These additional reformatted images were used to evaluate the spine for alignment, vertebral fractures and the integrity of the the posterior elements.  This scan was performed using automatic exposure control (radiation dose reduction software) to obtain a diagnostic image quality scan with patient dose as low as reasonably achievable.    FINDINGS:   No prior similar studies are available for review    Lumbar vertebral body heights are maintained. No vertebral fracture is seen. No destructive bone lesion is found.  Alignment is preserved.  Facet joints appear aligned.  The visualized sacral and pelvic bones appear intact.    Lumbar intervertebral disc spaces appear intact. Mild bulges noted at L4-5 thecal sac as well as a RIGHT paracentral extruded disc herniation measuring 1.4 cm which compresses the descending RIGHT L5 nerve root. Resulting moderate central stenosis is noted at this level.    No paraspinal mass is recognized.  Paraspinal soft tissues appear intact.      IMPRESSION:  Mild bulges noted at L4-5 thecal sac as well as a RIGHT paracentral extruded disc herniation measuring 1.4 cm which compresses the descending RIGHT L5 nerve root. Resulting moderate central stenosis is noted at this level.     No vertebral fracture is recognized.    URIEL BARR M.D., ATTENDING RADIOLOGIST  This document has been electronically signed. Jan 20 2020  9:02PM

## 2020-01-22 ENCOUNTER — INPATIENT (INPATIENT)
Facility: HOSPITAL | Age: 30
LOS: 3 days | Discharge: HOME | DRG: 520 | End: 2020-01-26
Attending: NEUROLOGICAL SURGERY | Admitting: NEUROLOGICAL SURGERY
Payer: MEDICAID

## 2020-01-22 VITALS
RESPIRATION RATE: 16 BRPM | TEMPERATURE: 98 F | OXYGEN SATURATION: 94 % | DIASTOLIC BLOOD PRESSURE: 61 MMHG | SYSTOLIC BLOOD PRESSURE: 126 MMHG | HEART RATE: 63 BPM

## 2020-01-22 VITALS
RESPIRATION RATE: 18 BRPM | OXYGEN SATURATION: 95 % | SYSTOLIC BLOOD PRESSURE: 115 MMHG | HEART RATE: 82 BPM | DIASTOLIC BLOOD PRESSURE: 75 MMHG | TEMPERATURE: 98 F

## 2020-01-22 DIAGNOSIS — Z90.49 ACQUIRED ABSENCE OF OTHER SPECIFIED PARTS OF DIGESTIVE TRACT: Chronic | ICD-10-CM

## 2020-01-22 DIAGNOSIS — G95.20 UNSPECIFIED CORD COMPRESSION: ICD-10-CM

## 2020-01-22 LAB
ALBUMIN SERPL ELPH-MCNC: 3.7 G/DL — SIGNIFICANT CHANGE UP (ref 3.3–5)
ALP SERPL-CCNC: 34 U/L — LOW (ref 40–120)
ALT FLD-CCNC: 31 U/L — SIGNIFICANT CHANGE UP (ref 10–45)
ANION GAP SERPL CALC-SCNC: 12 MMOL/L — SIGNIFICANT CHANGE UP (ref 5–17)
APTT BLD: 24.5 SEC — LOW (ref 27.5–36.3)
AST SERPL-CCNC: 16 U/L — SIGNIFICANT CHANGE UP (ref 10–40)
BASOPHILS # BLD AUTO: 0 K/UL — SIGNIFICANT CHANGE UP (ref 0–0.2)
BASOPHILS NFR BLD AUTO: 0 % — SIGNIFICANT CHANGE UP (ref 0–2)
BILIRUB SERPL-MCNC: 0.2 MG/DL — SIGNIFICANT CHANGE UP (ref 0.2–1.2)
BLD GP AB SCN SERPL QL: NEGATIVE — SIGNIFICANT CHANGE UP
BUN SERPL-MCNC: 15 MG/DL — SIGNIFICANT CHANGE UP (ref 7–23)
CALCIUM SERPL-MCNC: 8.5 MG/DL — SIGNIFICANT CHANGE UP (ref 8.4–10.5)
CHLORIDE SERPL-SCNC: 104 MMOL/L — SIGNIFICANT CHANGE UP (ref 96–108)
CO2 SERPL-SCNC: 22 MMOL/L — SIGNIFICANT CHANGE UP (ref 22–31)
CREAT SERPL-MCNC: 0.68 MG/DL — SIGNIFICANT CHANGE UP (ref 0.5–1.3)
EOSINOPHIL # BLD AUTO: 0 K/UL — SIGNIFICANT CHANGE UP (ref 0–0.5)
EOSINOPHIL NFR BLD AUTO: 0 % — SIGNIFICANT CHANGE UP (ref 0–6)
GLUCOSE SERPL-MCNC: 139 MG/DL — HIGH (ref 70–99)
HCG SERPL-ACNC: <2 MIU/ML — SIGNIFICANT CHANGE UP
HCT VFR BLD CALC: 39.9 % — SIGNIFICANT CHANGE UP (ref 34.5–45)
HGB BLD-MCNC: 13.5 G/DL — SIGNIFICANT CHANGE UP (ref 11.5–15.5)
IMM GRANULOCYTES NFR BLD AUTO: 0.5 % — SIGNIFICANT CHANGE UP (ref 0–1.5)
INR BLD: 1.11 RATIO — SIGNIFICANT CHANGE UP (ref 0.88–1.16)
LYMPHOCYTES # BLD AUTO: 1.07 K/UL — SIGNIFICANT CHANGE UP (ref 1–3.3)
LYMPHOCYTES # BLD AUTO: 9 % — LOW (ref 13–44)
MCHC RBC-ENTMCNC: 31.1 PG — SIGNIFICANT CHANGE UP (ref 27–34)
MCHC RBC-ENTMCNC: 33.8 GM/DL — SIGNIFICANT CHANGE UP (ref 32–36)
MCV RBC AUTO: 91.9 FL — SIGNIFICANT CHANGE UP (ref 80–100)
MONOCYTES # BLD AUTO: 0.6 K/UL — SIGNIFICANT CHANGE UP (ref 0–0.9)
MONOCYTES NFR BLD AUTO: 5.1 % — SIGNIFICANT CHANGE UP (ref 2–14)
NEUTROPHILS # BLD AUTO: 10.12 K/UL — HIGH (ref 1.8–7.4)
NEUTROPHILS NFR BLD AUTO: 85.4 % — HIGH (ref 43–77)
NRBC # BLD: 0 /100 WBCS — SIGNIFICANT CHANGE UP (ref 0–0)
PLATELET # BLD AUTO: 208 K/UL — SIGNIFICANT CHANGE UP (ref 150–400)
POTASSIUM SERPL-MCNC: 4 MMOL/L — SIGNIFICANT CHANGE UP (ref 3.5–5.3)
POTASSIUM SERPL-SCNC: 4 MMOL/L — SIGNIFICANT CHANGE UP (ref 3.5–5.3)
PROT SERPL-MCNC: 6.1 G/DL — SIGNIFICANT CHANGE UP (ref 6–8.3)
PROTHROM AB SERPL-ACNC: 12.7 SEC — SIGNIFICANT CHANGE UP (ref 10–12.9)
RBC # BLD: 4.34 M/UL — SIGNIFICANT CHANGE UP (ref 3.8–5.2)
RBC # FLD: 11.9 % — SIGNIFICANT CHANGE UP (ref 10.3–14.5)
RH IG SCN BLD-IMP: NEGATIVE — SIGNIFICANT CHANGE UP
SODIUM SERPL-SCNC: 138 MMOL/L — SIGNIFICANT CHANGE UP (ref 135–145)
WBC # BLD: 11.85 K/UL — HIGH (ref 3.8–10.5)
WBC # FLD AUTO: 11.85 K/UL — HIGH (ref 3.8–10.5)

## 2020-01-22 PROCEDURE — 93010 ELECTROCARDIOGRAM REPORT: CPT

## 2020-01-22 PROCEDURE — 99232 SBSQ HOSP IP/OBS MODERATE 35: CPT | Mod: GC

## 2020-01-22 PROCEDURE — 99232 SBSQ HOSP IP/OBS MODERATE 35: CPT

## 2020-01-22 RX ORDER — ONDANSETRON 8 MG/1
4 TABLET, FILM COATED ORAL EVERY 6 HOURS
Refills: 0 | Status: DISCONTINUED | OUTPATIENT
Start: 2020-01-22 | End: 2020-01-25

## 2020-01-22 RX ORDER — TRAMADOL HYDROCHLORIDE 50 MG/1
25 TABLET ORAL EVERY 4 HOURS
Refills: 0 | Status: DISCONTINUED | OUTPATIENT
Start: 2020-01-22 | End: 2020-01-25

## 2020-01-22 RX ORDER — TRAMADOL HYDROCHLORIDE 50 MG/1
50 TABLET ORAL EVERY 4 HOURS
Refills: 0 | Status: DISCONTINUED | OUTPATIENT
Start: 2020-01-22 | End: 2020-01-25

## 2020-01-22 RX ORDER — CYCLOBENZAPRINE HYDROCHLORIDE 10 MG/1
5 TABLET, FILM COATED ORAL THREE TIMES A DAY
Refills: 0 | Status: DISCONTINUED | OUTPATIENT
Start: 2020-01-22 | End: 2020-01-25

## 2020-01-22 RX ORDER — SODIUM CHLORIDE 9 MG/ML
1000 INJECTION INTRAMUSCULAR; INTRAVENOUS; SUBCUTANEOUS
Refills: 0 | Status: DISCONTINUED | OUTPATIENT
Start: 2020-01-22 | End: 2020-01-23

## 2020-01-22 RX ORDER — LIDOCAINE 4 G/100G
1 CREAM TOPICAL DAILY
Refills: 0 | Status: DISCONTINUED | OUTPATIENT
Start: 2020-01-22 | End: 2020-01-25

## 2020-01-22 RX ORDER — GABAPENTIN 400 MG/1
300 CAPSULE ORAL THREE TIMES A DAY
Refills: 0 | Status: DISCONTINUED | OUTPATIENT
Start: 2020-01-22 | End: 2020-01-22

## 2020-01-22 RX ORDER — DEXAMETHASONE 0.5 MG/5ML
4 ELIXIR ORAL EVERY 6 HOURS
Refills: 0 | Status: DISCONTINUED | OUTPATIENT
Start: 2020-01-22 | End: 2020-01-24

## 2020-01-22 RX ORDER — GABAPENTIN 400 MG/1
400 CAPSULE ORAL THREE TIMES A DAY
Refills: 0 | Status: DISCONTINUED | OUTPATIENT
Start: 2020-01-22 | End: 2020-01-24

## 2020-01-22 RX ORDER — SENNA PLUS 8.6 MG/1
2 TABLET ORAL AT BEDTIME
Refills: 0 | Status: DISCONTINUED | OUTPATIENT
Start: 2020-01-22 | End: 2020-01-25

## 2020-01-22 RX ORDER — POLYETHYLENE GLYCOL 3350 17 G/17G
17 POWDER, FOR SOLUTION ORAL DAILY
Refills: 0 | Status: DISCONTINUED | OUTPATIENT
Start: 2020-01-22 | End: 2020-01-25

## 2020-01-22 RX ORDER — PANTOPRAZOLE SODIUM 20 MG/1
40 TABLET, DELAYED RELEASE ORAL DAILY
Refills: 0 | Status: DISCONTINUED | OUTPATIENT
Start: 2020-01-22 | End: 2020-01-25

## 2020-01-22 RX ORDER — CYCLOBENZAPRINE HYDROCHLORIDE 10 MG/1
10 TABLET, FILM COATED ORAL THREE TIMES A DAY
Refills: 0 | Status: DISCONTINUED | OUTPATIENT
Start: 2020-01-22 | End: 2020-01-22

## 2020-01-22 RX ORDER — ACETAMINOPHEN 500 MG
650 TABLET ORAL EVERY 6 HOURS
Refills: 0 | Status: DISCONTINUED | OUTPATIENT
Start: 2020-01-22 | End: 2020-01-25

## 2020-01-22 RX ORDER — AMITRIPTYLINE HCL 25 MG
25 TABLET ORAL AT BEDTIME
Refills: 0 | Status: DISCONTINUED | OUTPATIENT
Start: 2020-01-22 | End: 2020-01-25

## 2020-01-22 RX ORDER — ENOXAPARIN SODIUM 100 MG/ML
40 INJECTION SUBCUTANEOUS DAILY
Refills: 0 | Status: DISCONTINUED | OUTPATIENT
Start: 2020-01-22 | End: 2020-01-24

## 2020-01-22 RX ADMIN — CYCLOBENZAPRINE HYDROCHLORIDE 10 MILLIGRAM(S): 10 TABLET, FILM COATED ORAL at 09:36

## 2020-01-22 RX ADMIN — TRAMADOL HYDROCHLORIDE 50 MILLIGRAM(S): 50 TABLET ORAL at 20:38

## 2020-01-22 RX ADMIN — LIDOCAINE 1 PATCH: 4 CREAM TOPICAL at 12:21

## 2020-01-22 RX ADMIN — ENOXAPARIN SODIUM 40 MILLIGRAM(S): 100 INJECTION SUBCUTANEOUS at 18:01

## 2020-01-22 RX ADMIN — TRAMADOL HYDROCHLORIDE 50 MILLIGRAM(S): 50 TABLET ORAL at 21:10

## 2020-01-22 RX ADMIN — Medication 25 MILLIGRAM(S): at 22:14

## 2020-01-22 RX ADMIN — TRAMADOL HYDROCHLORIDE 25 MILLIGRAM(S): 50 TABLET ORAL at 16:39

## 2020-01-22 RX ADMIN — TRAMADOL HYDROCHLORIDE 25 MILLIGRAM(S): 50 TABLET ORAL at 17:10

## 2020-01-22 RX ADMIN — GABAPENTIN 300 MILLIGRAM(S): 400 CAPSULE ORAL at 06:17

## 2020-01-22 RX ADMIN — GABAPENTIN 400 MILLIGRAM(S): 400 CAPSULE ORAL at 21:24

## 2020-01-22 RX ADMIN — CYCLOBENZAPRINE HYDROCHLORIDE 5 MILLIGRAM(S): 10 TABLET, FILM COATED ORAL at 21:25

## 2020-01-22 RX ADMIN — Medication 650 MILLIGRAM(S): at 19:41

## 2020-01-22 RX ADMIN — TRAMADOL HYDROCHLORIDE 50 MILLIGRAM(S): 50 TABLET ORAL at 03:36

## 2020-01-22 RX ADMIN — CYCLOBENZAPRINE HYDROCHLORIDE 10 MILLIGRAM(S): 10 TABLET, FILM COATED ORAL at 03:06

## 2020-01-22 RX ADMIN — Medication 4 MILLIGRAM(S): at 12:21

## 2020-01-22 RX ADMIN — Medication 650 MILLIGRAM(S): at 20:10

## 2020-01-22 RX ADMIN — TRAMADOL HYDROCHLORIDE 50 MILLIGRAM(S): 50 TABLET ORAL at 11:20

## 2020-01-22 RX ADMIN — PANTOPRAZOLE SODIUM 40 MILLIGRAM(S): 20 TABLET, DELAYED RELEASE ORAL at 12:21

## 2020-01-22 RX ADMIN — SODIUM CHLORIDE 75 MILLILITER(S): 9 INJECTION INTRAMUSCULAR; INTRAVENOUS; SUBCUTANEOUS at 06:17

## 2020-01-22 RX ADMIN — Medication 500 MILLIGRAM(S): at 18:35

## 2020-01-22 RX ADMIN — Medication 4 MILLIGRAM(S): at 18:02

## 2020-01-22 RX ADMIN — GABAPENTIN 400 MILLIGRAM(S): 400 CAPSULE ORAL at 13:28

## 2020-01-22 RX ADMIN — Medication 500 MILLIGRAM(S): at 18:02

## 2020-01-22 RX ADMIN — Medication 4 MILLIGRAM(S): at 06:16

## 2020-01-22 RX ADMIN — TRAMADOL HYDROCHLORIDE 50 MILLIGRAM(S): 50 TABLET ORAL at 03:06

## 2020-01-22 RX ADMIN — TRAMADOL HYDROCHLORIDE 50 MILLIGRAM(S): 50 TABLET ORAL at 10:47

## 2020-01-22 RX ADMIN — SENNA PLUS 2 TABLET(S): 8.6 TABLET ORAL at 21:24

## 2020-01-22 RX ADMIN — Medication 1 TABLET(S): at 12:22

## 2020-01-22 RX ADMIN — LIDOCAINE 1 PATCH: 4 CREAM TOPICAL at 19:50

## 2020-01-22 NOTE — H&P ADULT - HISTORY OF PRESENT ILLNESS
29 F no sigPMHx pw R lumbar radiculopathy. Pt recalled a milder episode in 10/2019 that resolved after several weeks. She presented with acute onset of severe R lower lumbar back pain with radiation down R thigh to outside of R calf associated with severe muscle spasms and paresthesias down the R leg while trying to uncork a bottle last night in an awkward position. She took some ibuprofen last night and went to bed but this am woke up with severe pain and was unable to get out of bed. After taking a tot of 1600mg ibuprofen, she was assisted out of bed and came to ED. She had received IM dexamethasone 10mg, Dilaudid 1mg, 2 Percocet 5/325, total of 10mg Valium, toradol in ED for the last 6 hrs but still intractable pain. L/S Xray negative. She denied any fevers, chills, bowel or bladder issues. She has some weakness in the R leg and does not feel steady when walking.     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 29 F no sigPMHx pw R lumbar radiculopathy. Pt recalled a milder episode in 10/2019 that resolved after several weeks. She presented with acute onset of severe R lower lumbar back pain with radiation down R thigh to outside of R calf associated with severe muscle spasms and paresthesias down the R leg while trying to uncork a bottle last night in an awkward position. She took some ibuprofen last night and went to bed but this am woke up with severe pain and was unable to get out of bed. After taking a tot of 1600mg ibuprofen, she was assisted out of bed and came to ED. She had received IM dexamethasone 10mg, Dilaudid 1mg, 2 Percocet 5/325, total of 10mg Valium, toradol in ED for the last 6 hrs but still intractable pain. L/S Xray negative. She denied any fevers, chills, bowel or bladder issues. She has some weakness in the R leg and does not feel steady when walking.     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

## 2020-01-22 NOTE — H&P ADULT - ASSESSMENT
Allison Shi  29F direct admission to floor for L4-5 disc herniation  - NPO, added on for possible discectomy pending further discussion  - pain control  - preop labs / pregnancy test

## 2020-01-22 NOTE — CHART NOTE - NSCHARTNOTEFT_GEN_A_CORE
CAPRINI SCORE [CLOT] Score on Admission for     AGE RELATED RISK FACTORS                                                       MOBILITY RELATED FACTORS  [ ] Age 41-60 years                                            (1 Point)                  [ ] Bed rest                                                        (1 Point)  [ ] Age: 61-74 years                                           (2 Points)                 [ ] Plaster cast                                                   (2 Points)  [ ] Age= 75 years                                              (3 Points)                 [ 2] Bed bound for more than 72 hours                 (2 Points)    DISEASE RELATED RISK FACTORS                                               GENDER SPECIFIC FACTORS  [ ] Edema in the lower extremities                       (1 Point)                  [ ] Pregnancy                                                     (1 Point)  [ ] Varicose veins                                               (1 Point)                  [ ] Post-partum < 6 weeks                                   (1 Point)             [x ] BMI > 25 Kg/m2                                            (1 Point)                  [ ] Hormonal therapy  or oral contraception          (1 Point)                 [ ] Sepsis (in the previous month)                        (1 Point)                  [ ] History of pregnancy complications                 (1 point)  [ ] Pneumonia or serious lung disease                                               [ ] Unexplained or recurrent                     (1 Point)           (in the previous month)                               (1 Point)  [ ] Abnormal pulmonary function test                     (1 Point)                 SURGERY RELATED RISK FACTORS (include planned surgeries)  [ ] Acute myocardial infarction                              (1 Point)                 [ ]  Section                                             (1 Point)  [ ] Congestive heart failure (in the previous month)  (1 Point)         [ ] Minor surgery                                                  (1 Point)   [ ] Inflammatory bowel disease                             (1 Point)                 [ ] Arthroscopic surgery                                        (2 Points)  [ ] Central venous access                                      (2 Points)                [ ] General surgery lasting more than 45 minutes   (2 Points)       [ ] Stroke (in the previous month)                          (5 Points)               [ ] Elective arthroplasty                                         (5 Points)            [ ] current or past malignancy                              (2 Points)                                                                                                       HEMATOLOGY RELATED FACTORS                                                 TRAUMA RELATED RISK FACTORS  [ ] Prior episodes of VTE                                     (3 Points)                [ ] Fracture of the hip, pelvis, or leg                       (5 Points)  [ ] Positive family history for VTE                         (3 Points)                 [ ] Acute spinal cord injury (in the previous month)  (5 Points)  [ ] Prothrombin 84935 A                                     (3 Points)                 [ ] Paralysis  (less than 1 month)                             (5 Points)  [ ] Factor V Leiden                                             (3 Points)                  [ ] Multiple Trauma within 1 month                        (5 Points)  [ ] Lupus anticoagulants                                     (3 Points)                                                           [ ] Anticardiolipin antibodies                               (3 Points)                                                       [ ] High homocysteine in the blood                      (3 Points)                                             [ ] Other congenital or acquired thrombophilia      (3 Points)                                                [ ] Heparin induced thrombocytopenia                  (3 Points)                                          Total Score [     3     ]    Risk:  Very low 0   Low 1 to 2   Moderate 3 to 4   High =5       VTE Prophylasix Recommednations:  [x ] mechanical pneumatic compression devices                                      [ ] contraindicated: _____________________  [x ] chemo prophylasix                                                                                   [ ] contraindicated _____________________    **** HIGH LIKELIHOOD DVT PRESENT ON ADMISSION  [ ] (please order LE dopplers within 24 hours of admission)

## 2020-01-22 NOTE — PROGRESS NOTE ADULT - ASSESSMENT
29 F no sigPMHx pw R lumbar radiculopathy. Pt recalled a milder episode in 10/2019 that resolved after several weeks. She presented with acute onset of severe R lower lumbar back pain with radiation down R thigh to outside of R calf associated with severe muscle spasms and paresthesias down the R leg while trying to uncork a bottle last night in an awkward position . MRI Lumbar spine Migrating moderately large extruded disc at L4-5. Tiny left-sided synovial or nerve root sheath cyst at L5-S1.    Plan    Neuro-Continue conservative management. Increased Neurontin to 400mg tid. Continue steroids Dec 4q6. On tramadol and Flexeril   Vitals stable  PT eval  Seen with Dr Samuel at bedside   DVT ppx

## 2020-01-22 NOTE — PATIENT PROFILE ADULT - FALLEN IN THE PAST
Mom called back, I let her know that the hemoglobin done at the 9 month checkup was normal, she can go ahead and start to introduce milk either whole or 2% over the next month, I suggested putting the milk in a cup for mealtimes and continue formula and the bottles in a m   And before bed until she is use to the cup and then can switch those out as well, will discuss more at her 1 year checkup no

## 2020-01-22 NOTE — PATIENT PROFILE ADULT - FUNCTIONAL SCREEN CURRENT LEVEL: SWALLOWING (IF SCORE 2 OR MORE FOR ANY ITEM, CONSULT REHAB SERVICES), MLM)
no abrasions, no jaundice, no lesions, no pruritis, and no rashes.
0 = swallows foods/liquids without difficulty

## 2020-01-22 NOTE — H&P ADULT - ATTENDING COMMENTS
Pt seen and examined with resident earlier today.  Agree with above evaluation and assessment.  PT was feeling a little better and was able to ambulate to bathroom.  She says the gabapentin helps.  Pt still with right radicular pain from side of buttock to anterior lower tibial region.  Motor was 5/5, no B/B complaints.  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  Precautions given regarding foot drop and/or urinary retention/incontinence.  Will observe for one more day.  Likely d/c tomorrow on Medrol dose pack, increase in gabapentin to 400 mg tid, Flexeril, Elavil 25 mg qHS, and Naproxen 500 mg bid and will be given Rx for PT.  Pt would also like eval by pain management for EDS injection.  She will follow up in the office in one week.

## 2020-01-22 NOTE — PROGRESS NOTE ADULT - SUBJECTIVE AND OBJECTIVE BOX
SUBJECTIVE:   RLE radicular pain. No perneal numbness. No bowel or bladder dysfunction   OVERNIGHT EVENTS: none    Vital Signs Last 24 Hrs  T(C): 37.1 (22 Jan 2020 14:14), Max: 37.1 (22 Jan 2020 14:14)  T(F): 98.7 (22 Jan 2020 14:14), Max: 98.7 (22 Jan 2020 14:14)  HR: 73 (22 Jan 2020 14:14) (56 - 82)  BP: 121/75 (22 Jan 2020 14:14) (104/67 - 129/73)  RR: 18 (22 Jan 2020 14:14) (16 - 18)  SpO2: 95% (22 Jan 2020 14:14) (94% - 98%)    PHYSICAL EXAM:    Constitutional: No Acute Distress     Neurological: AOx3, Speech clear Following Commands, Moving all Extremities 5/5. Sensation grossly intact to light touch     Pulmonary: Clear to Auscultation,     Cardiovascular: S1, S2, Regular rate and rhythm     Gastrointestinal: Soft, Non-tender, Non-distended     Extremities: No calf tenderness    LABS:                        13.5   11.85 )-----------( 208      ( 22 Jan 2020 02:57 )             39.9    01-22    138  |  104  |  15  ----------------------------<  139<H>  4.0   |  22  |  0.68    Ca    8.5      22 Jan 2020 02:57    TPro  6.1  /  Alb  3.7  /  TBili  0.2  /  DBili  x   /  AST  16  /  ALT  31  /  AlkPhos  34<L>  01-22  PT/INR - ( 22 Jan 2020 02:57 )   PT: 12.7 sec;   INR: 1.11 ratio   PTT:24.5 sec          IMAGING:         MEDICATIONS:    acetaminophen   Tablet .. 650 milliGRAM(s) Oral every 6 hours PRN Temp greater or equal to 38C (100.4F), Mild Pain (1 - 3)  amitriptyline 25 milliGRAM(s) Oral at bedtime  cyclobenzaprine 5 milliGRAM(s) Oral three times a day  gabapentin 400 milliGRAM(s) Oral three times a day  naproxen 500 milliGRAM(s) Oral every 12 hours  ondansetron Injectable 4 milliGRAM(s) IV Push every 6 hours PRN Nausea and/or Vomiting  traMADol 25 milliGRAM(s) Oral every 4 hours PRN Moderate Pain (4 - 6)  traMADol 50 milliGRAM(s) Oral every 4 hours PRN Severe Pain (7 - 10)  bisacodyl 5 milliGRAM(s) Oral daily PRN Constipation  pantoprazole  Injectable 40 milliGRAM(s) IV Push daily  polyethylene glycol 3350 17 Gram(s) Oral daily PRN Constipation  senna 2 Tablet(s) Oral at bedtime  dexAMETHasone  Injectable 4 milliGRAM(s) IV Push every 6 hours  lidocaine   Patch 1 Patch Transdermal daily  multivitamin 1 Tablet(s) Oral daily  sodium chloride 0.9%. 1000 milliLiter(s) IV Continuous <Continuous>      DIET:     Assessment:  Please Check When Present   []  GCS  E   V  M     Heart Failure: []Acute, [] acute on chronic , []chronic  Heart Failure:  [] Diastolic (HFpEF), [] Systolic (HFrEF), []Combined (HFpEF and HFrEF), [] RHF, [] Pulm HTN, [] Other    [] FIFI, [] ATN, [] AIN, [] other  [] CKD1, [] CKD2, [] CKD 3, [] CKD 4, [] CKD 5, []ESRD    Encephalopathy: [] Metabolic, [] Hepatic, [] toxic, [] Neurological, [] Other    Abnormal Nurtitional Status: [] malnurtition (see nutrition note), [ ]underweight: BMI < 19, [] morbid obesity: BMI >40, [] Cachexia    [] Sepsis  [] hypovolemic shock,[] cardiogenic shock, [] hemorrhagic shock, [] neuogenic shock  [] Acute Respiratory Failure  []Cerebral edema, [] Brain compression/ herniation,   [] Functional quadriplegia  [] Acute blood loss anemia

## 2020-01-23 PROCEDURE — 99232 SBSQ HOSP IP/OBS MODERATE 35: CPT

## 2020-01-23 PROCEDURE — 93970 EXTREMITY STUDY: CPT | Mod: 26

## 2020-01-23 PROCEDURE — 99222 1ST HOSP IP/OBS MODERATE 55: CPT

## 2020-01-23 RX ORDER — DIAZEPAM 5 MG
5 TABLET ORAL ONCE
Refills: 0 | Status: DISCONTINUED | OUTPATIENT
Start: 2020-01-23 | End: 2020-01-23

## 2020-01-23 RX ORDER — DIAZEPAM 5 MG
5 TABLET ORAL EVERY 8 HOURS
Refills: 0 | Status: DISCONTINUED | OUTPATIENT
Start: 2020-01-23 | End: 2020-01-25

## 2020-01-23 RX ADMIN — GABAPENTIN 400 MILLIGRAM(S): 400 CAPSULE ORAL at 23:19

## 2020-01-23 RX ADMIN — PANTOPRAZOLE SODIUM 40 MILLIGRAM(S): 20 TABLET, DELAYED RELEASE ORAL at 13:11

## 2020-01-23 RX ADMIN — TRAMADOL HYDROCHLORIDE 50 MILLIGRAM(S): 50 TABLET ORAL at 13:45

## 2020-01-23 RX ADMIN — Medication 650 MILLIGRAM(S): at 03:17

## 2020-01-23 RX ADMIN — CYCLOBENZAPRINE HYDROCHLORIDE 5 MILLIGRAM(S): 10 TABLET, FILM COATED ORAL at 13:11

## 2020-01-23 RX ADMIN — Medication 500 MILLIGRAM(S): at 05:45

## 2020-01-23 RX ADMIN — Medication 4 MILLIGRAM(S): at 00:52

## 2020-01-23 RX ADMIN — SENNA PLUS 2 TABLET(S): 8.6 TABLET ORAL at 23:21

## 2020-01-23 RX ADMIN — TRAMADOL HYDROCHLORIDE 50 MILLIGRAM(S): 50 TABLET ORAL at 23:19

## 2020-01-23 RX ADMIN — TRAMADOL HYDROCHLORIDE 25 MILLIGRAM(S): 50 TABLET ORAL at 00:51

## 2020-01-23 RX ADMIN — TRAMADOL HYDROCHLORIDE 50 MILLIGRAM(S): 50 TABLET ORAL at 23:45

## 2020-01-23 RX ADMIN — TRAMADOL HYDROCHLORIDE 25 MILLIGRAM(S): 50 TABLET ORAL at 01:20

## 2020-01-23 RX ADMIN — Medication 4 MILLIGRAM(S): at 23:19

## 2020-01-23 RX ADMIN — Medication 4 MILLIGRAM(S): at 05:14

## 2020-01-23 RX ADMIN — TRAMADOL HYDROCHLORIDE 50 MILLIGRAM(S): 50 TABLET ORAL at 18:04

## 2020-01-23 RX ADMIN — Medication 650 MILLIGRAM(S): at 03:45

## 2020-01-23 RX ADMIN — LIDOCAINE 1 PATCH: 4 CREAM TOPICAL at 00:57

## 2020-01-23 RX ADMIN — ENOXAPARIN SODIUM 40 MILLIGRAM(S): 100 INJECTION SUBCUTANEOUS at 13:10

## 2020-01-23 RX ADMIN — LIDOCAINE 1 PATCH: 4 CREAM TOPICAL at 19:00

## 2020-01-23 RX ADMIN — Medication 1 TABLET(S): at 13:11

## 2020-01-23 RX ADMIN — Medication 4 MILLIGRAM(S): at 18:04

## 2020-01-23 RX ADMIN — Medication 4 MILLIGRAM(S): at 13:09

## 2020-01-23 RX ADMIN — CYCLOBENZAPRINE HYDROCHLORIDE 5 MILLIGRAM(S): 10 TABLET, FILM COATED ORAL at 23:20

## 2020-01-23 RX ADMIN — Medication 500 MILLIGRAM(S): at 05:13

## 2020-01-23 RX ADMIN — Medication 25 MILLIGRAM(S): at 23:20

## 2020-01-23 RX ADMIN — LIDOCAINE 1 PATCH: 4 CREAM TOPICAL at 13:09

## 2020-01-23 RX ADMIN — Medication 5 MILLIGRAM(S): at 15:47

## 2020-01-23 RX ADMIN — TRAMADOL HYDROCHLORIDE 50 MILLIGRAM(S): 50 TABLET ORAL at 05:13

## 2020-01-23 RX ADMIN — GABAPENTIN 400 MILLIGRAM(S): 400 CAPSULE ORAL at 05:14

## 2020-01-23 RX ADMIN — TRAMADOL HYDROCHLORIDE 50 MILLIGRAM(S): 50 TABLET ORAL at 05:45

## 2020-01-23 RX ADMIN — TRAMADOL HYDROCHLORIDE 50 MILLIGRAM(S): 50 TABLET ORAL at 18:35

## 2020-01-23 RX ADMIN — GABAPENTIN 400 MILLIGRAM(S): 400 CAPSULE ORAL at 13:11

## 2020-01-23 RX ADMIN — CYCLOBENZAPRINE HYDROCHLORIDE 5 MILLIGRAM(S): 10 TABLET, FILM COATED ORAL at 05:14

## 2020-01-23 RX ADMIN — Medication 5 MILLIGRAM(S): at 07:19

## 2020-01-23 RX ADMIN — TRAMADOL HYDROCHLORIDE 50 MILLIGRAM(S): 50 TABLET ORAL at 13:15

## 2020-01-23 NOTE — PHYSICAL THERAPY INITIAL EVALUATION ADULT - PRECAUTIONS/LIMITATIONS, REHAB EVAL
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 Pt a/w L4-5 disc herniation with lumbar radiculopathy. Pending possible OR. CT Lumbar spine: mild bulges at L4-5 thecal sac as well as a R paracentral extruded disc herniation compressing descending R L5 nerve root. Resulting moderate central stenosis noted at this level. MRI Lumbar spine: migrating extruded disc at L4-5. 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 Pt a/w L4-5 disc herniation with lumbar radiculopathy. Pending possible OR. CT Lumbar spine: mild bulges at L4-5 thecal sac as well as a R paracentral extruded disc herniation compressing descending R L5 nerve root. Resulting moderate central stenosis noted at this level. MRI Lumbar spine: migrating extruded disc at L4-5./spinal precautions

## 2020-01-23 NOTE — PHYSICAL THERAPY INITIAL EVALUATION ADULT - GAIT TRAINING, PT EVAL
GOAL; Pt will ambulate 300 feet with least restrictive device as appropriate independently in 2 weeks

## 2020-01-23 NOTE — PROGRESS NOTE ADULT - ASSESSMENT
29 F no sigPMHx pw R lumbar radiculopathy. Pt recalled a milder episode in 10/2019 that resolved after several weeks. She presented with acute onset of severe R lower lumbar back pain with radiation down R thigh to outside of R calf associated with severe muscle spasms and paresthesias down the R leg while trying to uncork a bottle last night in an awkward position . MRI Lumbar spine Migrating moderately large extruded disc at L4-5. Tiny left-sided synovial or nerve root sheath cyst at L5-S1.    Plan    Neuro-Continue conservative management. On Neurontin to 400mg tid. Continue steroids Dec 4q6. On tramadol and Flexeril  and valium prn   Vitals stable  PT eval today   Seen with Dr Samuel at bedside . If no improvement possible OR later this week   DVT ppx

## 2020-01-23 NOTE — PROGRESS NOTE ADULT - ATTENDING COMMENTS
As per above NP note.  PT is having continued episodes of leg spasms and pain.  Gabapentin helps.  Given valium which helped her to sleep. Motor intact.  Spoke with pt and he boyfriend and we discussed the films and options for treatment of L45 HNP.  Surgery is likely to speed recovery while conservative management may take much longer.  R/B  surgery discussed and risks include, but not limited to, bleeding, infection, recurrence, failure to relieve pain or pain worsening, future instability and need for surgery/fusion, CSF leak, nerve damage. Pt will consider her options and we will increase gabapentin to 600 mg tid.  Continue dexamethasone.

## 2020-01-23 NOTE — PROGRESS NOTE ADULT - SUBJECTIVE AND OBJECTIVE BOX
SUBJECTIVE:   Muscle spasms this morning . Relieved with valium     OVERNIGHT EVENTS: none    Vital Signs Last 24 Hrs  T(C): 36.9 (23 Jan 2020 12:53), Max: 36.9 (22 Jan 2020 20:45)  T(F): 98.4 (23 Jan 2020 12:53), Max: 98.5 (22 Jan 2020 20:45)  HR: 63 (23 Jan 2020 12:53) (50 - 76)  BP: 116/74 (23 Jan 2020 12:53) (106/65 - 128/86)  RR: 18 (23 Jan 2020 12:53) (18 - 18)  SpO2: 95% (23 Jan 2020 12:53) (94% - 96%)    PHYSICAL EXAM:    Constitutional: No Acute Distress     Neurological: Alert Ox3, Speech clear Following Commands, Moving all Extremities 5/5 Sensation intact      Pulmonary: Clear to Auscultation,     Cardiovascular: S1, S2, Regular rate and rhythm     Gastrointestinal: Soft, Non-tender, Non-distended     Extremities: No calf tenderness   LABS:                        13.5   11.85 )-----------( 208      ( 22 Jan 2020 02:57 )             39.9    01-22    138  |  104  |  15  ----------------------------<  139<H>  4.0   |  22  |  0.68    Ca    8.5      22 Jan 2020 02:57    TPro  6.1  /  Alb  3.7  /  TBili  0.2  /  DBili  x   /  AST  16  /  ALT  31  /  AlkPhos  34<L>  01-22  PT/INR - ( 22 Jan 2020 02:57 )   PT: 12.7 sec;   INR: 1.11 ratio   PTT:24.5 sec        IMAGING:         MEDICATIONS:    acetaminophen   Tablet .. 650 milliGRAM(s) Oral every 6 hours PRN Temp greater or equal to 38C (100.4F), Mild Pain (1 - 3)  amitriptyline 25 milliGRAM(s) Oral at bedtime  cyclobenzaprine 5 milliGRAM(s) Oral three times a day  diazepam    Tablet 5 milliGRAM(s) Oral every 8 hours PRN muscle spasm  gabapentin 400 milliGRAM(s) Oral three times a day  ondansetron Injectable 4 milliGRAM(s) IV Push every 6 hours PRN Nausea and/or Vomiting  traMADol 25 milliGRAM(s) Oral every 4 hours PRN Moderate Pain (4 - 6)  traMADol 50 milliGRAM(s) Oral every 4 hours PRN Severe Pain (7 - 10)  bisacodyl 5 milliGRAM(s) Oral daily PRN Constipation  pantoprazole  Injectable 40 milliGRAM(s) IV Push daily  polyethylene glycol 3350 17 Gram(s) Oral daily PRN Constipation  senna 2 Tablet(s) Oral at bedtime  dexAMETHasone  Injectable 4 milliGRAM(s) IV Push every 6 hours  enoxaparin Injectable 40 milliGRAM(s) SubCutaneous daily  lidocaine   Patch 1 Patch Transdermal daily  multivitamin 1 Tablet(s) Oral daily      DIET:     Assessment:  Please Check When Present   []  GCS  E   V  M     Heart Failure: []Acute, [] acute on chronic , []chronic  Heart Failure:  [] Diastolic (HFpEF), [] Systolic (HFrEF), []Combined (HFpEF and HFrEF), [] RHF, [] Pulm HTN, [] Other    [] FIFI, [] ATN, [] AIN, [] other  [] CKD1, [] CKD2, [] CKD 3, [] CKD 4, [] CKD 5, []ESRD    Encephalopathy: [] Metabolic, [] Hepatic, [] toxic, [] Neurological, [] Other    Abnormal Nurtitional Status: [] malnurtition (see nutrition note), [ ]underweight: BMI < 19, [] morbid obesity: BMI >40, [] Cachexia    [] Sepsis  [] hypovolemic shock,[] cardiogenic shock, [] hemorrhagic shock, [] neuogenic shock  [] Acute Respiratory Failure  []Cerebral edema, [] Brain compression/ herniation,   [] Functional quadriplegia  [] Acute blood loss anemia

## 2020-01-23 NOTE — PHYSICAL THERAPY INITIAL EVALUATION ADULT - PLANNED THERAPY INTERVENTIONS, PT EVAL
GOAL: Pt will negotiate 9 steps with unilateral handrail in a step-to pattern independently in 2 weeks/balance training/gait training

## 2020-01-24 ENCOUNTER — TRANSCRIPTION ENCOUNTER (OUTPATIENT)
Age: 30
End: 2020-01-24

## 2020-01-24 DIAGNOSIS — M51.26 OTHER INTERVERTEBRAL DISC DISPLACEMENT, LUMBAR REGION: ICD-10-CM

## 2020-01-24 PROBLEM — Z00.00 ENCOUNTER FOR PREVENTIVE HEALTH EXAMINATION: Status: ACTIVE | Noted: 2020-01-24

## 2020-01-24 LAB
BLD GP AB SCN SERPL QL: NEGATIVE — SIGNIFICANT CHANGE UP
HCG SERPL-ACNC: <2 MIU/ML — SIGNIFICANT CHANGE UP
RH IG SCN BLD-IMP: NEGATIVE — SIGNIFICANT CHANGE UP

## 2020-01-24 PROCEDURE — 99233 SBSQ HOSP IP/OBS HIGH 50: CPT | Mod: 57

## 2020-01-24 PROCEDURE — 99232 SBSQ HOSP IP/OBS MODERATE 35: CPT | Mod: 57

## 2020-01-24 RX ORDER — GABAPENTIN 400 MG/1
600 CAPSULE ORAL THREE TIMES A DAY
Refills: 0 | Status: DISCONTINUED | OUTPATIENT
Start: 2020-01-24 | End: 2020-01-25

## 2020-01-24 RX ORDER — DEXAMETHASONE 0.5 MG/5ML
3 ELIXIR ORAL EVERY 6 HOURS
Refills: 0 | Status: DISCONTINUED | OUTPATIENT
Start: 2020-01-24 | End: 2020-01-25

## 2020-01-24 RX ORDER — SODIUM CHLORIDE 9 MG/ML
1000 INJECTION INTRAMUSCULAR; INTRAVENOUS; SUBCUTANEOUS
Refills: 0 | Status: DISCONTINUED | OUTPATIENT
Start: 2020-01-24 | End: 2020-01-25

## 2020-01-24 RX ADMIN — Medication 4 MILLIGRAM(S): at 05:40

## 2020-01-24 RX ADMIN — SENNA PLUS 2 TABLET(S): 8.6 TABLET ORAL at 22:58

## 2020-01-24 RX ADMIN — CYCLOBENZAPRINE HYDROCHLORIDE 5 MILLIGRAM(S): 10 TABLET, FILM COATED ORAL at 14:37

## 2020-01-24 RX ADMIN — GABAPENTIN 600 MILLIGRAM(S): 400 CAPSULE ORAL at 22:59

## 2020-01-24 RX ADMIN — Medication 3 MILLIGRAM(S): at 11:53

## 2020-01-24 RX ADMIN — CYCLOBENZAPRINE HYDROCHLORIDE 5 MILLIGRAM(S): 10 TABLET, FILM COATED ORAL at 05:40

## 2020-01-24 RX ADMIN — Medication 3 MILLIGRAM(S): at 17:37

## 2020-01-24 RX ADMIN — Medication 3 MILLIGRAM(S): at 23:01

## 2020-01-24 RX ADMIN — Medication 1 TABLET(S): at 11:52

## 2020-01-24 RX ADMIN — Medication 5 MILLIGRAM(S): at 06:51

## 2020-01-24 RX ADMIN — LIDOCAINE 1 PATCH: 4 CREAM TOPICAL at 20:03

## 2020-01-24 RX ADMIN — TRAMADOL HYDROCHLORIDE 50 MILLIGRAM(S): 50 TABLET ORAL at 04:30

## 2020-01-24 RX ADMIN — TRAMADOL HYDROCHLORIDE 50 MILLIGRAM(S): 50 TABLET ORAL at 10:52

## 2020-01-24 RX ADMIN — LIDOCAINE 1 PATCH: 4 CREAM TOPICAL at 11:50

## 2020-01-24 RX ADMIN — Medication 25 MILLIGRAM(S): at 22:58

## 2020-01-24 RX ADMIN — TRAMADOL HYDROCHLORIDE 50 MILLIGRAM(S): 50 TABLET ORAL at 23:28

## 2020-01-24 RX ADMIN — LIDOCAINE 1 PATCH: 4 CREAM TOPICAL at 22:51

## 2020-01-24 RX ADMIN — GABAPENTIN 400 MILLIGRAM(S): 400 CAPSULE ORAL at 05:40

## 2020-01-24 RX ADMIN — LIDOCAINE 1 PATCH: 4 CREAM TOPICAL at 01:00

## 2020-01-24 RX ADMIN — GABAPENTIN 600 MILLIGRAM(S): 400 CAPSULE ORAL at 14:37

## 2020-01-24 RX ADMIN — TRAMADOL HYDROCHLORIDE 50 MILLIGRAM(S): 50 TABLET ORAL at 03:59

## 2020-01-24 RX ADMIN — Medication 5 MILLIGRAM(S): at 17:24

## 2020-01-24 RX ADMIN — PANTOPRAZOLE SODIUM 40 MILLIGRAM(S): 20 TABLET, DELAYED RELEASE ORAL at 11:52

## 2020-01-24 RX ADMIN — TRAMADOL HYDROCHLORIDE 50 MILLIGRAM(S): 50 TABLET ORAL at 22:58

## 2020-01-24 RX ADMIN — CYCLOBENZAPRINE HYDROCHLORIDE 5 MILLIGRAM(S): 10 TABLET, FILM COATED ORAL at 22:58

## 2020-01-24 NOTE — PROGRESS NOTE ADULT - ATTENDING COMMENTS
Pt states pain better with the valium and gabapentin and ambulates to bathroom.  Pt has decided for surgery.  Discussed further expectations, post operative instructions.  For surgery tomorrow AM. [id number] Exp Problem Focused - Mod. Complex

## 2020-01-24 NOTE — PROGRESS NOTE ADULT - ASSESSMENT
29 F no sigPMHx pw R lumbar radiculopathy. Pt recalled a milder episode in 10/2019 that resolved after several weeks. She presented with acute onset of severe R lower lumbar back pain with radiation down R thigh to outside of R calf associated with severe muscle spasms and paresthesias down the R leg while trying to uncork a bottle last night in an awkward position. She took some ibuprofen last night and went to bed but this am woke up with severe pain and was unable to get out of bed. After taking a tot of 1600mg ibuprofen, she was assisted out of bed and came to ED. She had received IM dexamethasone 10mg, Dilaudid 1mg, 2 Percocet 5/325, total of 10mg Valium, toradol in ED for the last 6 hrs but still intractable pain. L/S Xray negative. She denied any fevers, chills, bowel or bladder issues. She has some weakness in the R leg and does not feel steady when walking.   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 (22 Jan 2020 01:46)    PLAN:  -Neuro: patient had decided to proceed with surgery. Neurosurgery team notified and they said patient to go to OR in am with Dr. Samuel tomorrow. Patient made NPO after midnight and pre op labs done (including pregnancy test).   -Increased gabapentin to 600 mg tid as requested by Dr. Samuel  -Decreased decadron to 3q6 as discussed with neurosurgery team this morning.   -Tramadol for pain control  -Valium, flexeril for muscle spasms  -Encouraged incentive spirometry  -Senna miralax for bowel regimen  -Continue home meds  -DVT ppx: venodynes   -PT: after OR      Will discuss above with Dr. Samuel  Spectralink #[id number]     Assessment:  Please Check When Present   []  GCS  E   V  M     Heart Failure: []Acute, [] acute on chronic , []chronic  Heart Failure:  [] Diastolic (HFpEF), [] Systolic (HFrEF), []Combined (HFpEF and HFrEF), [] RHF, [] Pulm HTN, [] Other    [] FIFI, [] ATN, [] AIN, [] other  [] CKD1, [] CKD2, [] CKD 3, [] CKD 4, [] CKD 5, []ESRD    Encephalopathy: [] Metabolic, [] Hepatic, [] toxic, [] Neurological, [] Other    Abnormal Nurtitional Status: [] malnurtition (see nutrition note), [ ]underweight: BMI < 19, [] morbid obesity: BMI >40, [] Cachexia    [] Sepsis  [] hypovolemic shock,[] cardiogenic shock, [] hemorrhagic shock, [] neuogenic shock  [] Acute Respiratory Failure  []Cerebral edema, [] Brain compression/ herniation,   [] Functional quadriplegia  [] Acute blood loss anemia

## 2020-01-24 NOTE — PROGRESS NOTE ADULT - SUBJECTIVE AND OBJECTIVE BOX
SUBJECTIVE: Patient seen and examined at bedside. Complains of right leg spasms. Patient had decided to go forward with surgery. Neurosurgery team notified and plan for OR in am with Dr. Samuel.     OVERNIGHT EVENTS: none     Vital Signs Last 24 Hrs  T(C): 36.7 (24 Jan 2020 08:27), Max: 36.9 (23 Jan 2020 12:53)  T(F): 98.1 (24 Jan 2020 08:27), Max: 98.4 (23 Jan 2020 12:53)  HR: 51 (24 Jan 2020 08:27) (47 - 72)  BP: 127/85 (24 Jan 2020 08:27) (110/69 - 152/92)  BP(mean): --  RR: 18 (24 Jan 2020 08:27) (18 - 18)  SpO2: 96% (24 Jan 2020 08:27) (95% - 96%)    PHYSICAL EXAM:    General: No Acute Distress     Neurological: Awake, alert oriented to person, place and time, Following Commands, PERRL, EOMI, Face Symmetrical, Speech Fluent, Moving all extremities, Muscle Strength normal in all four extremities, right radicular pain from side of buttock to anterior lower tibial region,  No Drift, Sensation to Light Touch Intact    Pulmonary: Clear to Auscultation, No Rales, No Rhonchi, No Wheezes     Cardiovascular: S1, S2, Regular Rate and Rhythm     Gastrointestinal: Soft, Nontender, Nondistended     LABS: no new labs      DIET: regular diet; npo after midnight for OR in am     IMAGING: < from: MR Lumbar Spine No Cont (01.21.20 @ 15:31) >  IMPRESSION:    Migrating extruded disc at L4-5. Tiny left-sided synovial or nerve root sheath cyst at L5-S1.    < end of copied text >

## 2020-01-25 ENCOUNTER — APPOINTMENT (OUTPATIENT)
Dept: NEUROSURGERY | Facility: HOSPITAL | Age: 30
End: 2020-01-25

## 2020-01-25 ENCOUNTER — RESULT REVIEW (OUTPATIENT)
Age: 30
End: 2020-01-25

## 2020-01-25 PROCEDURE — 88304 TISSUE EXAM BY PATHOLOGIST: CPT | Mod: 26

## 2020-01-25 PROCEDURE — 63030 LAMOT DCMPRN NRV RT 1 LMBR: CPT | Mod: RT

## 2020-01-25 RX ORDER — PANTOPRAZOLE SODIUM 20 MG/1
40 TABLET, DELAYED RELEASE ORAL DAILY
Refills: 0 | Status: DISCONTINUED | OUTPATIENT
Start: 2020-01-25 | End: 2020-01-26

## 2020-01-25 RX ORDER — DEXAMETHASONE 0.5 MG/5ML
3 ELIXIR ORAL EVERY 6 HOURS
Refills: 0 | Status: DISCONTINUED | OUTPATIENT
Start: 2020-01-25 | End: 2020-01-26

## 2020-01-25 RX ORDER — GABAPENTIN 400 MG/1
600 CAPSULE ORAL THREE TIMES A DAY
Refills: 0 | Status: DISCONTINUED | OUTPATIENT
Start: 2020-01-25 | End: 2020-01-26

## 2020-01-25 RX ORDER — LIDOCAINE 4 G/100G
1 CREAM TOPICAL DAILY
Refills: 0 | Status: DISCONTINUED | OUTPATIENT
Start: 2020-01-25 | End: 2020-01-26

## 2020-01-25 RX ORDER — ONDANSETRON 8 MG/1
4 TABLET, FILM COATED ORAL ONCE
Refills: 0 | Status: DISCONTINUED | OUTPATIENT
Start: 2020-01-25 | End: 2020-01-25

## 2020-01-25 RX ORDER — ACETAMINOPHEN 500 MG
1000 TABLET ORAL ONCE
Refills: 0 | Status: COMPLETED | OUTPATIENT
Start: 2020-01-25 | End: 2020-01-25

## 2020-01-25 RX ORDER — SODIUM CHLORIDE 9 MG/ML
1000 INJECTION INTRAMUSCULAR; INTRAVENOUS; SUBCUTANEOUS
Refills: 0 | Status: DISCONTINUED | OUTPATIENT
Start: 2020-01-25 | End: 2020-01-25

## 2020-01-25 RX ORDER — TRAMADOL HYDROCHLORIDE 50 MG/1
25 TABLET ORAL EVERY 4 HOURS
Refills: 0 | Status: DISCONTINUED | OUTPATIENT
Start: 2020-01-25 | End: 2020-01-26

## 2020-01-25 RX ORDER — TRAMADOL HYDROCHLORIDE 50 MG/1
50 TABLET ORAL EVERY 4 HOURS
Refills: 0 | Status: DISCONTINUED | OUTPATIENT
Start: 2020-01-25 | End: 2020-01-26

## 2020-01-25 RX ORDER — DEXAMETHASONE 0.5 MG/5ML
1 ELIXIR ORAL EVERY 12 HOURS
Refills: 0 | Status: CANCELLED | OUTPATIENT
Start: 2020-01-28 | End: 2020-01-26

## 2020-01-25 RX ORDER — CEFAZOLIN SODIUM 1 G
2000 VIAL (EA) INJECTION EVERY 8 HOURS
Refills: 0 | Status: DISCONTINUED | OUTPATIENT
Start: 2020-01-25 | End: 2020-01-26

## 2020-01-25 RX ORDER — ONDANSETRON 8 MG/1
4 TABLET, FILM COATED ORAL EVERY 6 HOURS
Refills: 0 | Status: DISCONTINUED | OUTPATIENT
Start: 2020-01-25 | End: 2020-01-26

## 2020-01-25 RX ORDER — SENNA PLUS 8.6 MG/1
2 TABLET ORAL AT BEDTIME
Refills: 0 | Status: DISCONTINUED | OUTPATIENT
Start: 2020-01-25 | End: 2020-01-25

## 2020-01-25 RX ORDER — DIAZEPAM 5 MG
5 TABLET ORAL EVERY 8 HOURS
Refills: 0 | Status: DISCONTINUED | OUTPATIENT
Start: 2020-01-25 | End: 2020-01-26

## 2020-01-25 RX ORDER — CYCLOBENZAPRINE HYDROCHLORIDE 10 MG/1
5 TABLET, FILM COATED ORAL THREE TIMES A DAY
Refills: 0 | Status: DISCONTINUED | OUTPATIENT
Start: 2020-01-25 | End: 2020-01-26

## 2020-01-25 RX ORDER — ENOXAPARIN SODIUM 100 MG/ML
40 INJECTION SUBCUTANEOUS DAILY
Refills: 0 | Status: DISCONTINUED | OUTPATIENT
Start: 2020-01-26 | End: 2020-01-26

## 2020-01-25 RX ORDER — OXYCODONE AND ACETAMINOPHEN 5; 325 MG/1; MG/1
1 TABLET ORAL EVERY 4 HOURS
Refills: 0 | Status: DISCONTINUED | OUTPATIENT
Start: 2020-01-25 | End: 2020-01-25

## 2020-01-25 RX ORDER — SENNA PLUS 8.6 MG/1
2 TABLET ORAL AT BEDTIME
Refills: 0 | Status: DISCONTINUED | OUTPATIENT
Start: 2020-01-25 | End: 2020-01-26

## 2020-01-25 RX ORDER — ACETAMINOPHEN 500 MG
650 TABLET ORAL EVERY 6 HOURS
Refills: 0 | Status: DISCONTINUED | OUTPATIENT
Start: 2020-01-25 | End: 2020-01-26

## 2020-01-25 RX ORDER — POLYETHYLENE GLYCOL 3350 17 G/17G
17 POWDER, FOR SOLUTION ORAL DAILY
Refills: 0 | Status: DISCONTINUED | OUTPATIENT
Start: 2020-01-25 | End: 2020-01-26

## 2020-01-25 RX ORDER — AMITRIPTYLINE HCL 25 MG
25 TABLET ORAL AT BEDTIME
Refills: 0 | Status: DISCONTINUED | OUTPATIENT
Start: 2020-01-25 | End: 2020-01-26

## 2020-01-25 RX ORDER — HYDROMORPHONE HYDROCHLORIDE 2 MG/ML
0.25 INJECTION INTRAMUSCULAR; INTRAVENOUS; SUBCUTANEOUS
Refills: 0 | Status: DISCONTINUED | OUTPATIENT
Start: 2020-01-25 | End: 2020-01-25

## 2020-01-25 RX ORDER — DEXAMETHASONE 0.5 MG/5ML
ELIXIR ORAL
Refills: 0 | Status: DISCONTINUED | OUTPATIENT
Start: 2020-01-25 | End: 2020-01-26

## 2020-01-25 RX ORDER — DEXAMETHASONE 0.5 MG/5ML
2 ELIXIR ORAL EVERY 12 HOURS
Refills: 0 | Status: DISCONTINUED | OUTPATIENT
Start: 2020-01-27 | End: 2020-01-26

## 2020-01-25 RX ADMIN — Medication 3 MILLIGRAM(S): at 15:42

## 2020-01-25 RX ADMIN — SODIUM CHLORIDE 75 MILLILITER(S): 9 INJECTION INTRAMUSCULAR; INTRAVENOUS; SUBCUTANEOUS at 17:34

## 2020-01-25 RX ADMIN — Medication 25 MILLIGRAM(S): at 22:18

## 2020-01-25 RX ADMIN — Medication 3 MILLIGRAM(S): at 22:30

## 2020-01-25 RX ADMIN — TRAMADOL HYDROCHLORIDE 50 MILLIGRAM(S): 50 TABLET ORAL at 03:37

## 2020-01-25 RX ADMIN — CYCLOBENZAPRINE HYDROCHLORIDE 5 MILLIGRAM(S): 10 TABLET, FILM COATED ORAL at 22:19

## 2020-01-25 RX ADMIN — Medication 400 MILLIGRAM(S): at 17:30

## 2020-01-25 RX ADMIN — Medication 5 MILLIGRAM(S): at 01:10

## 2020-01-25 RX ADMIN — Medication 3 MILLIGRAM(S): at 05:50

## 2020-01-25 RX ADMIN — TRAMADOL HYDROCHLORIDE 50 MILLIGRAM(S): 50 TABLET ORAL at 22:19

## 2020-01-25 RX ADMIN — GABAPENTIN 600 MILLIGRAM(S): 400 CAPSULE ORAL at 05:50

## 2020-01-25 RX ADMIN — TRAMADOL HYDROCHLORIDE 50 MILLIGRAM(S): 50 TABLET ORAL at 23:00

## 2020-01-25 RX ADMIN — Medication 100 MILLIGRAM(S): at 22:19

## 2020-01-25 RX ADMIN — TRAMADOL HYDROCHLORIDE 50 MILLIGRAM(S): 50 TABLET ORAL at 03:07

## 2020-01-25 RX ADMIN — SENNA PLUS 2 TABLET(S): 8.6 TABLET ORAL at 22:18

## 2020-01-25 RX ADMIN — HYDROMORPHONE HYDROCHLORIDE 0.25 MILLIGRAM(S): 2 INJECTION INTRAMUSCULAR; INTRAVENOUS; SUBCUTANEOUS at 15:38

## 2020-01-25 RX ADMIN — HYDROMORPHONE HYDROCHLORIDE 0.25 MILLIGRAM(S): 2 INJECTION INTRAMUSCULAR; INTRAVENOUS; SUBCUTANEOUS at 15:55

## 2020-01-25 RX ADMIN — SODIUM CHLORIDE 75 MILLILITER(S): 9 INJECTION INTRAMUSCULAR; INTRAVENOUS; SUBCUTANEOUS at 05:53

## 2020-01-25 RX ADMIN — Medication 1000 MILLIGRAM(S): at 17:45

## 2020-01-25 RX ADMIN — CYCLOBENZAPRINE HYDROCHLORIDE 5 MILLIGRAM(S): 10 TABLET, FILM COATED ORAL at 05:50

## 2020-01-25 RX ADMIN — GABAPENTIN 600 MILLIGRAM(S): 400 CAPSULE ORAL at 22:18

## 2020-01-26 ENCOUNTER — TRANSCRIPTION ENCOUNTER (OUTPATIENT)
Age: 30
End: 2020-01-26

## 2020-01-26 VITALS
TEMPERATURE: 99 F | SYSTOLIC BLOOD PRESSURE: 113 MMHG | DIASTOLIC BLOOD PRESSURE: 71 MMHG | HEART RATE: 100 BPM | OXYGEN SATURATION: 94 % | RESPIRATION RATE: 18 BRPM

## 2020-01-26 PROCEDURE — 85610 PROTHROMBIN TIME: CPT

## 2020-01-26 PROCEDURE — 86900 BLOOD TYPING SEROLOGIC ABO: CPT

## 2020-01-26 PROCEDURE — C1889: CPT

## 2020-01-26 PROCEDURE — 85730 THROMBOPLASTIN TIME PARTIAL: CPT

## 2020-01-26 PROCEDURE — 86850 RBC ANTIBODY SCREEN: CPT

## 2020-01-26 PROCEDURE — 84702 CHORIONIC GONADOTROPIN TEST: CPT

## 2020-01-26 PROCEDURE — 80053 COMPREHEN METABOLIC PANEL: CPT

## 2020-01-26 PROCEDURE — 97161 PT EVAL LOW COMPLEX 20 MIN: CPT

## 2020-01-26 PROCEDURE — 93005 ELECTROCARDIOGRAM TRACING: CPT

## 2020-01-26 PROCEDURE — 76000 FLUOROSCOPY <1 HR PHYS/QHP: CPT

## 2020-01-26 PROCEDURE — 85027 COMPLETE CBC AUTOMATED: CPT

## 2020-01-26 PROCEDURE — 88304 TISSUE EXAM BY PATHOLOGIST: CPT

## 2020-01-26 PROCEDURE — 86901 BLOOD TYPING SEROLOGIC RH(D): CPT

## 2020-01-26 PROCEDURE — 93970 EXTREMITY STUDY: CPT

## 2020-01-26 RX ORDER — AMITRIPTYLINE HCL 25 MG
1 TABLET ORAL
Qty: 0 | Refills: 0 | DISCHARGE
Start: 2020-01-26

## 2020-01-26 RX ORDER — PANTOPRAZOLE SODIUM 20 MG/1
1 TABLET, DELAYED RELEASE ORAL
Qty: 7 | Refills: 0
Start: 2020-01-26 | End: 2020-02-01

## 2020-01-26 RX ORDER — TRAMADOL HYDROCHLORIDE 50 MG/1
0.5 TABLET ORAL
Qty: 21 | Refills: 0
Start: 2020-01-26 | End: 2020-02-01

## 2020-01-26 RX ORDER — CYCLOBENZAPRINE HYDROCHLORIDE 10 MG/1
1 TABLET, FILM COATED ORAL
Qty: 15 | Refills: 0
Start: 2020-01-26 | End: 2020-01-30

## 2020-01-26 RX ADMIN — Medication 100 MILLIGRAM(S): at 05:55

## 2020-01-26 RX ADMIN — GABAPENTIN 600 MILLIGRAM(S): 400 CAPSULE ORAL at 05:54

## 2020-01-26 RX ADMIN — ENOXAPARIN SODIUM 40 MILLIGRAM(S): 100 INJECTION SUBCUTANEOUS at 12:57

## 2020-01-26 RX ADMIN — TRAMADOL HYDROCHLORIDE 50 MILLIGRAM(S): 50 TABLET ORAL at 03:20

## 2020-01-26 RX ADMIN — CYCLOBENZAPRINE HYDROCHLORIDE 5 MILLIGRAM(S): 10 TABLET, FILM COATED ORAL at 05:55

## 2020-01-26 RX ADMIN — TRAMADOL HYDROCHLORIDE 50 MILLIGRAM(S): 50 TABLET ORAL at 07:44

## 2020-01-26 RX ADMIN — Medication 3 MILLIGRAM(S): at 09:25

## 2020-01-26 RX ADMIN — Medication 3 MILLIGRAM(S): at 04:15

## 2020-01-26 RX ADMIN — TRAMADOL HYDROCHLORIDE 50 MILLIGRAM(S): 50 TABLET ORAL at 13:01

## 2020-01-26 RX ADMIN — Medication 1 TABLET(S): at 12:57

## 2020-01-26 RX ADMIN — TRAMADOL HYDROCHLORIDE 50 MILLIGRAM(S): 50 TABLET ORAL at 08:17

## 2020-01-26 RX ADMIN — TRAMADOL HYDROCHLORIDE 50 MILLIGRAM(S): 50 TABLET ORAL at 02:49

## 2020-01-26 RX ADMIN — TRAMADOL HYDROCHLORIDE 50 MILLIGRAM(S): 50 TABLET ORAL at 13:46

## 2020-01-26 NOTE — PHYSICAL THERAPY INITIAL EVALUATION ADULT - ADDITIONAL COMMENTS
pt will be staying in private home with her boyfriend chata, 1 flight of stairs +1 HR however bed/bath on same floor. Prior to admission, pt was I with all functional mobility and ADLs without AD. +. 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
Pt states she lives alone in an apartment, 2 steps down to enter, no railing. Pt states prior to admission being independent with all functional mobility and ADLs. Pt states she plans on staying with her boyfriend (at bedside) in a private home with 3 steps to enter and a flight of steps to bedroom (+handrail). 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

## 2020-01-26 NOTE — DISCHARGE NOTE PROVIDER - NSDCMRMEDTOKEN_GEN_ALL_CORE_FT
acetaminophen 325 mg oral tablet: 2 tab(s) orally every 6 hours, As needed, Mild Pain (1 - 3)  amitriptyline 25 mg oral tablet: 1 tab(s) orally once a day (at bedtime)  cyclobenzaprine 10 mg oral tablet: 1 tab(s) orally 3 times a day, As needed, Muscle Spasm MDD:3 tabs  gabapentin 300 mg oral capsule: 1 cap(s) orally 3 times a day  lidocaine 5% topical film: Apply topically to affected area once a day  Medrol 2 mg oral tablet: Take Medrol Dose Reggie as directed  Multiple Vitamins oral tablet: 1 tab(s) orally once a day  Ortho Tri-Cyclen Lo oral tablet: 1 tab(s) orally once a day  pantoprazole 40 mg oral delayed release tablet: 1 tab(s) orally once a day (before a meal)  polyethylene glycol 3350 oral powder for reconstitution: 17 gram(s) orally once a day, As needed, Constipation  senna oral tablet: 2 tab(s) orally once a day (at bedtime)  traMADol 50 mg oral tablet: 0.5-1 tab(s) orally every 4 hours, As needed, Moderate Pain (4 - 6) MDD:6 tabs

## 2020-01-26 NOTE — PHYSICAL THERAPY INITIAL EVALUATION ADULT - PERTINENT HX OF CURRENT PROBLEM, REHAB EVAL
30 y/o F with no significant PMH p/w R lumbar radiculopathy. Pt with h/o similar episode (10/2019) that resolved after several weeks. Pt reports episode occurred while trying to uncork a bottle in an awkward position. Pt took some ibuprofen, went to bed, then woke up with severe pain and was unable to get OOB. 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
30 yo F p/w R lumbar radiculopathy. Pt recalled a milder episode in 10/2019 that resolved after several weeks. Now p/w severe R lower lumbar back pain with radiation down R thigh to outside of R calf associated with severe muscle spasms and paresthesias down the R leg while trying to uncork a bottle last night in an awkward position. She took some ibuprofen without relief. Initially seeking conservative management, now s/p L4-L5 lami/ discectomy 1/25.

## 2020-01-26 NOTE — PROGRESS NOTE ADULT - REASON FOR ADMISSION
Admitted 1/22 right L5 radiculopathy found with L4-5 disc herniation; 1/25 s/p RT L4-5 microdiscectomy

## 2020-01-26 NOTE — PHYSICAL THERAPY INITIAL EVALUATION ADULT - PRECAUTIONS/LIMITATIONS, REHAB EVAL
surgical precautions/spinal precautions/CONT: CT Lumbar spine 1/21: Mild bulges noted at L4-5 thecal sac as well as a RIGHT paracentral extruded disc herniation measuring 1.4 cm which compresses the descending RIGHT L5 nerve root. Resulting moderate central stenosis is noted at this level

## 2020-01-26 NOTE — DISCHARGE NOTE NURSING/CASE MANAGEMENT/SOCIAL WORK - PATIENT PORTAL LINK FT
You can access the FollowMyHealth Patient Portal offered by [facility] by registering at the following website: [email or web address]. By joining [facility]’s FollowMyHealth portal, you will also be able to view your health information using other applications (apps) compatible with our system.

## 2020-01-26 NOTE — PHYSICAL THERAPY INITIAL EVALUATION ADULT - CRITERIA FOR SKILLED THERAPEUTIC INTERVENTIONS
functional limitations in following categories/impairments found
Pt is I with functional mobility, cleared from a PT standpoint. Pt to be taken off PT program, however pt will remain on amb aide to maintain strength and functional independence.

## 2020-01-26 NOTE — PHYSICAL THERAPY INITIAL EVALUATION ADULT - GAIT DEVIATIONS NOTED, PT EVAL
decreased daly/decreased weight-shifting ability/decreased velocity of limb motion
decreased weight-shifting ability

## 2020-01-26 NOTE — PHYSICAL THERAPY INITIAL EVALUATION ADULT - DISCHARGE DISPOSITION, PT EVAL
outpatient PT/home w/ assist
Anticipate outpatient PT and RW for ambulation. PT will need to re-evaluate if pt elects surgery. Pt plans on staying with boyfriend. Pt may benefit from heel lifts and/or orthotist for b/l dorsiflexion deficits.

## 2020-01-26 NOTE — DISCHARGE NOTE PROVIDER - NSDCCPCAREPLAN_GEN_ALL_CORE_FT
PRINCIPAL DISCHARGE DIAGNOSIS  Diagnosis: Herniated lumbar intervertebral disc  Assessment and Plan of Treatment: s/p RT L4-5 microdiscectomy  Please make an appointment for follow up with neurosurgeon Dr. Leonard Samuel in 1-2 weeks. Call [phone] to schedule an appointment. Your stitches are absorbable and do not need to be removed. Keep incision site clean and dry. No creams, lotions, or ointments to incision area. Ok to shower but do not allow water to hit incision site directly. Gently pat dry after shower.   NO heavy lifting, strenuous activity, twisting, bending, driving, or working until cleared by your physician.   Return to ER immediately for any of the following: fever, bleeding, new onset numbness/tingling/weakness, nausea and/or vomiting, chest pain, shortness of breath, confusion, seizure, altered mental status, urinary and/or fecal incontinence or retention.

## 2020-01-26 NOTE — PROGRESS NOTE ADULT - ASSESSMENT
HPI: 29 F no sigPMHx pw R lumbar radiculopathy. Pt recalled a milder episode in 10/2019 that resolved after several weeks. She presented with acute onset of severe R lower lumbar back pain with radiation down R thigh to outside of R calf associated with severe muscle spasms and paresthesias down the R leg while trying to uncork a bottle last night in an awkward position. She was found with L4-5 disc herniation.     PROCEDURE: Admitted 1/22; 1/25 s/p right L4-5 microdiscectomy POD#1    PLAN:   - Continue Tramadol PRN for pain control; Flexeril PRN for muscle spasm  - Continue Neurontin 600 TID for neuropathy  - Continue Decadron taper for spinal cord edema  - Continue Protonix for GI prophylaxis while on steroids  - Senna, miralax for bowel regimen  - Encouraged mobilization  - Incentive spirometer  - DVT prophylaxis: SQL, b/l venodynes  - Dispo: TBD pending PT eval today  - Anticipate discharge home today  - Will discuss with Dr. Lizzie Coyleink # [id number]    Assessment:  Please Check When Present   []  GCS  E   V  M     Heart Failure: []Acute, [] acute on chronic , []chronic  Heart Failure:  [] Diastolic (HFpEF), [] Systolic (HFrEF), []Combined (HFpEF and HFrEF), [] RHF, [] Pulm HTN, [] Other    [] FIFI, [] ATN, [] AIN, [] other  [] CKD1, [] CKD2, [] CKD 3, [] CKD 4, [] CKD 5, []ESRD    Encephalopathy: [] Metabolic, [] Hepatic, [] toxic, [] Neurological, [] Other    Abnormal Nurtitional Status: [] malnurtition (see nutrition note), [ ]underweight: BMI < 19, [] morbid obesity: BMI >40, [] Cachexia    [] Sepsis  [] hypovolemic shock,[] cardiogenic shock, [] hemorrhagic shock, [] neuogenic shock  [] Acute Respiratory Failure  []Cerebral edema, [] Brain compression/ herniation,   [] Functional quadriplegia  [] Acute blood loss anemia

## 2020-01-26 NOTE — DISCHARGE NOTE PROVIDER - HOSPITAL COURSE
Patient is a 29 year old female with no significant PMHx who presented with right lower extremity  radiculopathy. Pt recalled a milder episode in 10/2019 that resolved after several weeks. She presented with acute onset of severe R lower lumbar back pain with radiation down R thigh to outside of R calf associated with severe muscle spasms and paresthesias down the R leg while trying to uncork a bottle last night in an awkward position. She was found with L4-5 disc herniation and admitted on 1/22/20. On 1/25/20 she underwent RT L4-5 microdiscectomy. RLE radiculopathy significantly improved post-operatively. Hospital course uncomplicated. She was evaluated by physical therapy who recommended no needs. On the day of discharge, she is medically and neurosurgically stable and cleared for discharge.

## 2020-01-26 NOTE — DISCHARGE NOTE PROVIDER - CARE PROVIDER_API CALL
Leonard Samuel)  Neurological Surgery  [address] [place] [address], [address] [place] [address]  Phone: [phone]  Fax: [phone]  Follow Up Time: 1 week

## 2020-01-26 NOTE — DISCHARGE NOTE PROVIDER - REASON FOR ADMISSION
Admitted 1/22 RLE radiculopathy, found with L4-5 disc herniation; 1/25 s/p RT L4-5 microdiscectomy L4-5 disc herniation

## 2020-01-26 NOTE — PHYSICAL THERAPY INITIAL EVALUATION ADULT - GENERAL OBSERVATIONS, REHAB EVAL
Pt abraham 60 min eval fair. Pt admitted with LBP with RLE radiculopathy 2/2 L4-5 herniation. Pt pending OR vs conservative management. Pain meds coordinated with ANDREINA Alvarez. Pt initially seen in bed with boyfriend at bedside. When PT returned pt ambulating wihtout a device from bathroom. Pt ambulates on forefoot b/l, more so on RLE 2/2 pain. Pt reports h/o achilles/heel tightness. PT reviewed conservative route of therex and ambulation with walker for pain control. Pt appeared to exhibit some relief with trunk extension therex.
pt found semi supine +IVL

## 2020-01-26 NOTE — PROGRESS NOTE ADULT - SUBJECTIVE AND OBJECTIVE BOX
SUBJECTIVE: Patient seen and examined with boyfriend at bedside. Agrees to be examined with boyfriend present. Right leg radiculopathy resolved post operatively. Denies chest pain, shortness of breath, nausea/vomiting. +flatus    Vital Signs Last 24 Hrs  T(C): 36.6 (01-26-20 @ 09:10), Max: 36.9 (01-25-20 @ 17:30)  T(F): 97.9 (01-26-20 @ 09:10), Max: 98.5 (01-25-20 @ 17:30)  HR: 88 (01-26-20 @ 09:10) (59 - 105)  BP: 123/80 (01-26-20 @ 09:10) (109/70 - 139/82)  BP(mean): 90 (01-25-20 @ 16:10) (84 - 107)  RR: 18 (01-26-20 @ 09:10) (15 - 18)  SpO2: 95% (01-26-20 @ 09:10) (93% - 100%)    PHYSICAL EXAM:    Constitutional: No Acute Distress, resting comfortably in bed    Neurological: Awake, alert, oriented to person, place and time, speech clear and fluent, face equal, tongue midline, briskly following commands, no drift, moves all extremities with 5/5 strength, sensation intact to light touch throughout (improved since preop), pupils 4mm and reactive bilaterally, extraocular movements intact, no nystagmus    Incision: Dressing removed; +low back incision w/ dermabond C/D/I    Drains: none    Pulmonary: Clear to Auscultation, No rales, No rhonchi, No wheezes     Cardiovascular: S1, S2, Regular rate and rhythm     Gastrointestinal: Soft, Non-tender, Non-distended, +bowel sounds x 4    Extremities: No calf tenderness bilaterally, no cyanosis, clubbing or edema          LABS:             01-25 @ 07:01  -  01-26 @ 07:00  --------------------------------------------------------  IN: 515 mL / OUT: 1200 mL / NET: -685 mL    01-26 @ 07:01 - 01-26 @ 11:23  --------------------------------------------------------  IN: 320 mL / OUT: 550 mL / NET: -230 mL        IMAGING: no new post operative imaging    MEDICATIONS:  Antibiotics:  ceFAZolin   IVPB 2000 milliGRAM(s) IV Intermittent every 8 hours    Neuro:  acetaminophen   Tablet .. 650 milliGRAM(s) Oral every 6 hours PRN Temp greater or equal to 38C (100.4F), Mild Pain (1 - 3)  amitriptyline 25 milliGRAM(s) Oral at bedtime  cyclobenzaprine 5 milliGRAM(s) Oral three times a day  diazepam    Tablet 5 milliGRAM(s) Oral every 8 hours PRN muscle spasm  gabapentin 600 milliGRAM(s) Oral three times a day  ondansetron Injectable 4 milliGRAM(s) IV Push every 6 hours PRN Nausea and/or Vomiting  traMADol 25 milliGRAM(s) Oral every 4 hours PRN Moderate Pain (4 - 6)  traMADol 50 milliGRAM(s) Oral every 4 hours PRN Severe Pain (7 - 10)    Cardiac:    Pulm:    GI/:  bisacodyl 5 milliGRAM(s) Oral daily PRN Constipation  pantoprazole  Injectable 40 milliGRAM(s) IV Push daily  polyethylene glycol 3350 17 Gram(s) Oral daily PRN Constipation  senna 2 Tablet(s) Oral at bedtime    Other:   dexAMETHasone  Injectable   IV Push   dexAMETHasone  Injectable 3 milliGRAM(s) IV Push every 6 hours  enoxaparin Injectable 40 milliGRAM(s) SubCutaneous daily  lidocaine   Patch 1 Patch Transdermal daily  multivitamin 1 Tablet(s) Oral daily        DIET: regular

## 2020-01-30 LAB — SURGICAL PATHOLOGY STUDY: SIGNIFICANT CHANGE UP

## 2020-02-01 ENCOUNTER — OUTPATIENT (OUTPATIENT)
Dept: OUTPATIENT SERVICES | Facility: HOSPITAL | Age: 30
LOS: 1 days | End: 2020-02-01
Payer: MEDICAID

## 2020-02-01 DIAGNOSIS — Z90.49 ACQUIRED ABSENCE OF OTHER SPECIFIED PARTS OF DIGESTIVE TRACT: Chronic | ICD-10-CM

## 2020-02-01 PROCEDURE — G9001: CPT

## 2020-02-07 ENCOUNTER — APPOINTMENT (OUTPATIENT)
Dept: NEUROSURGERY | Facility: CLINIC | Age: 30
End: 2020-02-07
Payer: MEDICAID

## 2020-02-07 VITALS
TEMPERATURE: 97.6 F | HEART RATE: 91 BPM | OXYGEN SATURATION: 97 % | HEIGHT: 62 IN | BODY MASS INDEX: 36.44 KG/M2 | WEIGHT: 198 LBS | SYSTOLIC BLOOD PRESSURE: 143 MMHG | RESPIRATION RATE: 16 BRPM | DIASTOLIC BLOOD PRESSURE: 91 MMHG

## 2020-02-07 DIAGNOSIS — Z87.891 PERSONAL HISTORY OF NICOTINE DEPENDENCE: ICD-10-CM

## 2020-02-07 PROCEDURE — 99024 POSTOP FOLLOW-UP VISIT: CPT

## 2020-02-07 NOTE — PHYSICAL EXAM
[General Appearance - Alert] : alert [General Appearance - Well Nourished] : well nourished [General Appearance - In No Acute Distress] : in no acute distress [General Appearance - Well-Appearing] : healthy appearing [Longitudinal] : longitudinal [Clean] : clean [Healing Well] : healing well [Dry] : dry [No Drainage] : without drainage [Impaired Insight] : insight and judgment were intact [Oriented To Time, Place, And Person] : oriented to person, place, and time [Affect] : the affect was normal [Memory Recent] : recent memory was not impaired [Person] : oriented to person [Place] : oriented to place [Time] : oriented to time [Short Term Intact] : short term memory intact [Motor Strength] : muscle strength was normal in all four extremities [Sensation Tactile Decrease] : light touch was intact [5] : S1 flexor hallucis longus 5/5 [Sensation Pain / Temperature Decrease] : pain and temperature was intact [Balance] : balance was intact [Non-ambulatory] : Non-ambulatory [2+] : Ankle jerk left 2+ [No Visual Abnormalities] : no visible abnormailities [Full ROM] : full ROM [Sclera] : the sclera and conjunctiva were normal [No Pain with ROM] : no pain with motion in any direction [PERRL With Normal Accommodation] : pupils were equal in size, round, reactive to light, with normal accommodation [Full Visual Field] : full visual field [Outer Ear] : the ears and nose were normal in appearance [Hearing Threshold Finger Rub Not [place]] : hearing was normal [Neck Appearance] : the appearance of the neck was normal [Neck Cervical Mass (___cm)] : no neck mass was observed [Exaggerated Use Of Accessory Muscles For Inspiration] : no accessory muscle use [Heart Rate And Rhythm] : heart rate was normal and rhythm regular [Abnormal Walk] : normal gait [Involuntary Movements] : no involuntary movements were seen [Motor Tone] : muscle strength and tone were normal [Normal Skin] : normal [Respiration, Rhythm And Depth] : normal respiratory rhythm and effort [] : no respiratory distress

## 2020-02-17 NOTE — REASON FOR VISIT
[Friend] : friend [de-identified] : Rt L4-L5 micro discectomy. [de-identified] : 01/25/2020 [de-identified] : 2

## 2020-02-17 NOTE — HISTORY OF PRESENT ILLNESS
[FreeTextEntry1] : Ms. Shi  is a 29 year old female with no significant PMHx who had undergone RT L4-5 microdiscectomy for  right lower extremity radiculopathy.  She presented with acute onset of severe R lower lumbar back pain with radiation down R thigh to outside of R calf associated with severe muscle spasms and paresthesias down the R leg while trying to uncork a bottle  in an awkward position. She was found with L4-5 disc herniation and admitted on 1/22/20. On 1/25/20 she underwent RT L4-5 microdiscectomy. RLE radiculopathy significantly improved post-operatively. \par Hospital course uncomplicated. Discharged home on 01/26/2020\par \par Today Ms Shi  present ambulatory  believes that he improved over all, > 90% better. She reports have some pain while sitting ,bending, laughing and sneezing. She feels better standing. Surgical wound healing with no pain and redness or drainage. Not taking any pain medication at this time.  No bowel or bladder dysfunction. Doing great, pre surgical symptoms almost gone away. Says 'Feels good' .

## 2020-02-17 NOTE — ASSESSMENT
[FreeTextEntry1] : Impression:\par \par L4-5 microdiscectomy for  RLE radiculopathy done on 02/25/20, doing well, pre surgical symptoms disappeared \par \par Plan:\par Physical Therapy for back and legs 2 times/week x 8 weeks\par Excuse from work until 4/30/2020, document given \par Follow up in 6 weeks\par

## 2020-03-03 DIAGNOSIS — Z71.89 OTHER SPECIFIED COUNSELING: ICD-10-CM

## 2020-03-20 ENCOUNTER — APPOINTMENT (OUTPATIENT)
Dept: NEUROSURGERY | Facility: CLINIC | Age: 30
End: 2020-03-20
Payer: MEDICAID

## 2020-03-20 PROCEDURE — 99024 POSTOP FOLLOW-UP VISIT: CPT

## 2020-04-28 NOTE — ASSESSMENT
[FreeTextEntry1] : Telephonic conversation done for 15 minutes.Pt is s/p L4-5 microdiscectomy for RLE radiculopathy done on 02/25/20, doing well, pre surgical symptoms disappeared and surgical wound healed well. No limitation of ROM or weakness of legs.Has no c/o pain and not taking any pain medication. \par \par Plan:\par Patient can go back to work, provided she shouldn't be lifting > 25 lbs at this time.\par Will clear her to go back to work effective 4/1/2020.\par Requested letter for work will try to email her  on Monday.\par Will follow up in 2 months.

## 2020-04-28 NOTE — HISTORY OF PRESENT ILLNESS
[Home] : at home, [unfilled] , at the time of the visit. [Medical Office: ([facility])___] : at the medical office located in  [Other:____] : [unfilled] [Patient] : the patient [Self] : self [FreeTextEntry1] : Ms. Shi is a 29 year old female with no significant PMHx who had undergone RT L4-5 microdiscectomy for right lower extremity radiculopathy. She presented with acute onset of severe R lower lumbar back pain with radiation down R thigh to outside of R calf associated with severe muscle spasms and paresthesias down the R leg while trying to uncork a bottle in an awkward position. She was found with L4-5 disc herniation and admitted on 1/22/20. On 1/25/20 she underwent RT L4-5 microdiscectomy. RLE radiculopathy significantly improved post-operatively. \par Hospital course uncomplicated. Discharged home on 01/26/2020 and had her 1 st follow up appointment done in 2/7/2020 .\par \par Today Ms Shi believes that he improved over all, > 90% better. She reports have some pain while sitting ,bending, laughing and sneezing. She feels better standing. She states that her Surgical wound healed well. Not taking any pain medication at this time. No bowel or bladder dysfunction. Doing great, pre surgical symptoms almost gone away. Says 'Feels good'. She wanted to know if she is able to go back to her work sooner than 4/30/2020 and needs a letter with her restrictions. [FreeTextEntry4] : SOPHIA Medrano also present during the tele conversation

## 2020-05-05 ENCOUNTER — TRANSCRIPTION ENCOUNTER (OUTPATIENT)
Age: 30
End: 2020-05-05

## 2020-08-20 ENCOUNTER — OUTPATIENT (OUTPATIENT)
Dept: OUTPATIENT SERVICES | Facility: HOSPITAL | Age: 30
LOS: 1 days | End: 2020-08-20
Payer: MEDICAID

## 2020-08-20 ENCOUNTER — APPOINTMENT (OUTPATIENT)
Dept: RADIOLOGY | Facility: HOSPITAL | Age: 30
End: 2020-08-20
Payer: MEDICAID

## 2020-08-20 DIAGNOSIS — Z90.49 ACQUIRED ABSENCE OF OTHER SPECIFIED PARTS OF DIGESTIVE TRACT: Chronic | ICD-10-CM

## 2020-08-20 DIAGNOSIS — Z00.8 ENCOUNTER FOR OTHER GENERAL EXAMINATION: ICD-10-CM

## 2020-08-20 PROCEDURE — 73630 X-RAY EXAM OF FOOT: CPT | Mod: 26,LT,RT

## 2020-08-20 PROCEDURE — 73630 X-RAY EXAM OF FOOT: CPT

## 2020-08-25 ENCOUNTER — APPOINTMENT (OUTPATIENT)
Dept: NEUROLOGY | Facility: CLINIC | Age: 30
End: 2020-08-25
Payer: MEDICAID

## 2020-08-25 VITALS
HEIGHT: 62 IN | DIASTOLIC BLOOD PRESSURE: 78 MMHG | HEART RATE: 74 BPM | WEIGHT: 215 LBS | SYSTOLIC BLOOD PRESSURE: 111 MMHG | BODY MASS INDEX: 39.56 KG/M2

## 2020-08-25 VITALS — TEMPERATURE: 97.1 F

## 2020-08-25 DIAGNOSIS — R26.9 UNSPECIFIED ABNORMALITIES OF GAIT AND MOBILITY: ICD-10-CM

## 2020-08-25 PROCEDURE — 99215 OFFICE O/P EST HI 40 MIN: CPT

## 2020-08-25 NOTE — HISTORY OF PRESENT ILLNESS
[FreeTextEntry1] : Patient is seeing me for hx of toe walking. She is s/p L4/5 disectomy for sciatica and reports that since childhood she has a tendency to walk on her toes. She is able to stand flat footed but experiences tension in her calves when attempting to heel strike. When she took baclofen postop she notes more ease in standing but no changes in her gait. Denies any family hx of neurological issues and has nevere seen a pediatric neurologist. \par \par Meds\par OCPs

## 2020-08-25 NOTE — PHYSICAL EXAM
[Cranial Nerves Oculomotor (III)] : extraocular motion intact [Cranial Nerves Optic (II)] : visual acuity intact bilaterally,  visual fields full to confrontation, pupils equal round and reactive to light [Motor Strength] : muscle strength was normal in all four extremities [Involuntary Movements] : no involuntary movements were seen [FreeTextEntry1] : No KF rings. EOMI. Tremor is absent. [place] normal. There is mild limitation in DF b/l with absence of spasticity or hypertonia\par DTRs 1+ UE and trace to 1+ LE with high arches\par Propioception intact\par Walks on her toes with not other abnormal posturing. Walking backwards ?easier\par  is ok with no mirroring\par

## 2020-08-25 NOTE — DISCUSSION/SUMMARY
[FreeTextEntry1] : Patient with long standing toe walking. ?dystonic element may be present but suspect this is developmental\par \par Patient was counseled on the following recommendations:\par \par - trial of levodopa. Titrate sinemet 25/100 1 tab TID over 3 weeks. Discussed AEs\par - obtain brain and c/spine mri to r/o structural anomaly\par - refer to physiatry for possible BTX and bracing options\par \par f/u 4-6 weeks

## 2020-09-03 ENCOUNTER — OUTPATIENT (OUTPATIENT)
Dept: OUTPATIENT SERVICES | Facility: HOSPITAL | Age: 30
LOS: 1 days | End: 2020-09-03
Payer: MEDICAID

## 2020-09-03 ENCOUNTER — APPOINTMENT (OUTPATIENT)
Dept: MRI IMAGING | Facility: HOSPITAL | Age: 30
End: 2020-09-03
Payer: MEDICAID

## 2020-09-03 ENCOUNTER — APPOINTMENT (OUTPATIENT)
Dept: NEUROLOGY | Facility: CLINIC | Age: 30
End: 2020-09-03

## 2020-09-03 DIAGNOSIS — Z90.49 ACQUIRED ABSENCE OF OTHER SPECIFIED PARTS OF DIGESTIVE TRACT: Chronic | ICD-10-CM

## 2020-09-03 DIAGNOSIS — Z00.8 ENCOUNTER FOR OTHER GENERAL EXAMINATION: ICD-10-CM

## 2020-09-03 PROCEDURE — 72141 MRI NECK SPINE W/O DYE: CPT

## 2020-09-03 PROCEDURE — 70551 MRI BRAIN STEM W/O DYE: CPT | Mod: 26

## 2020-09-03 PROCEDURE — 72141 MRI NECK SPINE W/O DYE: CPT | Mod: 26

## 2020-09-03 PROCEDURE — 70551 MRI BRAIN STEM W/O DYE: CPT

## 2020-09-08 ENCOUNTER — APPOINTMENT (OUTPATIENT)
Dept: NEUROSURGERY | Facility: CLINIC | Age: 30
End: 2020-09-08
Payer: MEDICAID

## 2020-09-08 VITALS
HEART RATE: 67 BPM | RESPIRATION RATE: 16 BRPM | BODY MASS INDEX: 39.56 KG/M2 | SYSTOLIC BLOOD PRESSURE: 113 MMHG | DIASTOLIC BLOOD PRESSURE: 76 MMHG | WEIGHT: 215 LBS | HEIGHT: 62 IN | OXYGEN SATURATION: 97 % | TEMPERATURE: 98.5 F

## 2020-09-08 PROCEDURE — 99214 OFFICE O/P EST MOD 30 MIN: CPT

## 2020-09-08 NOTE — ASSESSMENT
[FreeTextEntry1] : Impression:\par Pt is s/p L4-5 microdiscectomy for RLE radiculopathy done on 02/25/20, was doing well, pre surgical symptoms disappeared and surgical wound healed well. Recently started with a new  pain on right lumbar with spam and mild ROM to right lateral. No weakness or paraesthesia  of legs.\par \par Plan:\par [facility] for back strengthening and modalities 2-3 times/weeks x 8 weeks\par Cyclobenzaprine 5 mg Q8 Hrs PRN pain and spasm.\par Xray lumbar spine flexion/extension 4 views, will call with report.\par Will follow up in 2 months with Dr. Samuel.\par Encouraged mild exercises and walking at home.\par Advised dietician consultation for weight reduction.\par

## 2020-09-08 NOTE — REVIEW OF SYSTEMS
[Negative] : Genitourinary [de-identified] : lower back pain [FreeTextEntry9] : back pain right lateral lumbar

## 2020-09-08 NOTE — PHYSICAL EXAM
[General Appearance - In No Acute Distress] : in no acute distress [General Appearance - Well Nourished] : well nourished [General Appearance - Alert] : alert [General Appearance - Well-Appearing] : healthy appearing [Oriented To Time, Place, And Person] : oriented to person, place, and time [Affect] : the affect was normal [Person] : oriented to person [Memory Recent] : recent memory was not impaired [Time] : oriented to time [Place] : oriented to place [5] : S1 ankle flexors 5/5 [2+] : Brachioradialis left 2+ [1+] : Ankle jerk right 1+ [Full ROM] : full ROM [Sclera] : the sclera and conjunctiva were normal [Neck Appearance] : the appearance of the neck was normal [Both Tympanic Membranes Were Examined] : both tympanic membranes were normal [] : no respiratory distress [Apical Impulse] : the apical impulse was normal [Involuntary Movements] : no involuntary movements were seen [Abnormal Walk] : normal gait [Bowel Sounds] : normal bowel sounds [Restricted] : was not restricted [Pain] : was painless

## 2020-09-08 NOTE — HISTORY OF PRESENT ILLNESS
[FreeTextEntry1] : Ms. Shi is a 29 year old female with no significant PMHx who had undergone RT L4-5 microdiscectomy for right lower extremity radiculopathy. She presented with acute onset of severe R lower lumbar back pain with radiation down R thigh to outside of R calf associated with severe muscle spasms and paresthesias down the R leg while trying to uncork a bottle in an awkward position. She was found with L4-5 disc herniation and admitted on 1/22/20. On 1/25/20 she underwent RT L4-5 microdiscectomy. Hospital course uncomplicated. Discharged home on 01/26/2020 and had her 1 st follow up appointment done in 2/7/2020. RLE radiculopathy significantly improved post-operatively. \par \par Today Ms Shi doing a follow up for her back pain. She has a lower back pain mainly on right side,   which is like a spasmodic pain 6/10  most of the time.  Denies pain radiating to LE. Her pain getting worse while sitting ,bending, laughing and sneezing. She feels better standing. Not taking any pain medication at this time. No bowel or bladder dysfunction. She did not go back to work as her facility could not assign a role with her limitations . She just started working with PT and experiencing  no much relief. She said, she is going to work with her dietician for weight reduction.

## 2020-09-11 ENCOUNTER — APPOINTMENT (OUTPATIENT)
Dept: RADIOLOGY | Facility: HOSPITAL | Age: 30
End: 2020-09-11
Payer: MEDICAID

## 2020-09-11 ENCOUNTER — OUTPATIENT (OUTPATIENT)
Dept: OUTPATIENT SERVICES | Facility: HOSPITAL | Age: 30
LOS: 1 days | End: 2020-09-11
Payer: MEDICAID

## 2020-09-11 DIAGNOSIS — Z90.49 ACQUIRED ABSENCE OF OTHER SPECIFIED PARTS OF DIGESTIVE TRACT: Chronic | ICD-10-CM

## 2020-09-11 DIAGNOSIS — Z00.8 ENCOUNTER FOR OTHER GENERAL EXAMINATION: ICD-10-CM

## 2020-09-11 PROCEDURE — 72110 X-RAY EXAM L-2 SPINE 4/>VWS: CPT | Mod: 26

## 2020-09-11 PROCEDURE — 72110 X-RAY EXAM L-2 SPINE 4/>VWS: CPT

## 2020-10-02 ENCOUNTER — APPOINTMENT (OUTPATIENT)
Dept: NEUROLOGY | Facility: CLINIC | Age: 30
End: 2020-10-02
Payer: MEDICAID

## 2020-10-02 VITALS
BODY MASS INDEX: 39.56 KG/M2 | HEART RATE: 71 BPM | HEIGHT: 62 IN | DIASTOLIC BLOOD PRESSURE: 69 MMHG | WEIGHT: 215 LBS | SYSTOLIC BLOOD PRESSURE: 109 MMHG

## 2020-10-02 VITALS — TEMPERATURE: 97.5 F

## 2020-10-02 DIAGNOSIS — G24.9 DYSTONIA, UNSPECIFIED: ICD-10-CM

## 2020-10-02 PROCEDURE — 99214 OFFICE O/P EST MOD 30 MIN: CPT

## 2020-10-02 NOTE — PHYSICAL EXAM
[FreeTextEntry1] :  No KF rings. EOMI. Tremor is absent. [place] normal. ?writers cramp with mirror movements noted\par Leg and foot taps are good. She walks with improved heel strike and does not get up on her toes. Walking backwards feels better. Tone in the ankles are mildly increased\par DTRs 1+ UE and trace to 1+ LE \par

## 2020-10-02 NOTE — DISCUSSION/SUMMARY
[FreeTextEntry1] : Patient with long standing toe walking that has dystonic elements to it along with possible writer's cramp. Her gait is responsive to levodopa raising possibility of DRD. WIll send dystonia panel including DYT1. WIll also check ceruloplasmin and manganese as part of the workup. She will continue to take c/l 1 tab TID and do PT\par \par f/u 3-4 months\par

## 2020-11-04 NOTE — HISTORY OF PRESENT ILLNESS
[FreeTextEntry1] : Ms. Shi is a 29 year old female with no significant PMHx who had undergone RT L4-5 microdiscectomy for right lower extremity radiculopathy. She presented with acute onset of severe R lower lumbar back pain with radiation down R thigh to outside of R calf associated with severe muscle spasms and paresthesias down the R leg while trying to uncork a bottle in an awkward position. She was found with L4-5 disc herniation and admitted on 1/22/20. On 1/25/20 she underwent RT L4-5 microdiscectomy. Hospital course uncomplicated. Discharged home on 01/26/2020 and had her 1 st follow up appointment done in 2/7/2020. RLE radiculopathy significantly improved post-operatively. Pt had a visit on 9/8/20 with pain and spasm which is currently being managed with muscle relaxant and PT. \par \par Today Ms Shi doing a follow up for her back pain. She has a lower back pain mainly on right side, which is like a spasmodic pain 6/10 most of the time. Denies pain radiating to LE. Her pain getting worse while sitting ,bending, laughing and sneezing. She feels better standing. Not taking any pain medication at this time. No bowel or bladder dysfunction. She did not go back to work as her facility could not assign a role with her limitations . She just started working with PT and experiencing no much relief. She said, she is going to work with her dietician for weight reduction

## 2020-11-06 ENCOUNTER — APPOINTMENT (OUTPATIENT)
Dept: NEUROSURGERY | Facility: CLINIC | Age: 30
End: 2020-11-06

## 2020-12-04 ENCOUNTER — APPOINTMENT (OUTPATIENT)
Dept: NEUROSURGERY | Facility: CLINIC | Age: 30
End: 2020-12-04
Payer: MEDICAID

## 2020-12-04 PROCEDURE — 99213 OFFICE O/P EST LOW 20 MIN: CPT

## 2020-12-04 PROCEDURE — 99072 ADDL SUPL MATRL&STAF TM PHE: CPT

## 2020-12-04 NOTE — HISTORY OF PRESENT ILLNESS
[FreeTextEntry1] : Ms. Shi is a 29 year old female with no significant PMHx who had undergone RT L4-5 microdiscectomy for right lower extremity radiculopathy. She presented with acute onset of severe R lower lumbar back pain with radiation down R thigh to outside of R calf associated with severe muscle spasms and paresthesias down the R leg while trying to uncork a bottle in an awkward position. She was found with L4-5 disc herniation and admitted on 1/22/20. On 1/25/20 she underwent RT L4-5 microdiscectomy. Hospital course uncomplicated. Discharged home on 01/26/2020 and had her 1 st follow up appointment done in 2/7/2020. RLE radiculopathy significantly improved post-operatively. Pt had a visit on 9/8/20 with pain and spasm which is currently being managed with muscle relaxant and PT. Advised Xray lumbar with follow up. \par \par Today, pt reports feelinng better.  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  NO heavy lifting.  First week was hard and was having aback spasm  but now does not get that at all.  Last time she got a refill on the muscle relaxant and was taking at night but has not taken in last 2 months.  the only time with pain and discomfort was when she is getting out of bed.  She  was hired where she was getting PT, but she is note getting it anymore.  Pt feels good.  Back feels ok overall.  \par

## 2020-12-04 NOTE — RESULTS/DATA
[FreeTextEntry1] : EXAM: SPINE LUMBOSACRAL MIN 4 VIEW \par \par \par PROCEDURE DATE: 09/11/2020 \par \par \par \par INTERPRETATION: Radiographs of the lumbar spine CPT 30720 \par \par CLINICAL INFORMATION: Low back pain of unknown severity x1 year. \par \par TECHNIQUE: Frontal and lateral views of the lumbar spine, flexion and extension imaging, and AP view of the pelvis were obtained. \par \par FINDINGS: Prior study dated 1/20/2020 was available for review. \par \par No gross vertebral body fracture is demonstrated. Mild disc space narrowing is noted at the L4-L5 level. There is no evidence for subluxation upon flexion or extension imaging. Straightening of the lumbar lordosis is noted. The sacroiliac joints appear symmetric bilaterally. \par \par IMPRESSION: Mild disc space narrowing is noted at the L4-L5 level. Straightening of the lumbar lordosis is noted. \par \par \par \par \par \par \par \par \par \par SAEED GRACIA M.D., ATTENDING RADIOLOGIST \par This document has been electronically signed. Sep 11 2020 11:55AM

## 2020-12-04 NOTE — ASSESSMENT
[FreeTextEntry1] : Pt s/p L45 Right is now feeling better with occasional pains. Pt is working without difficulty.\par Can return as needed.  Can take flexeril 5 mg prn for muscle spasms and call us for a new prescription, if needed.

## 2020-12-29 ENCOUNTER — APPOINTMENT (OUTPATIENT)
Dept: NEUROLOGY | Facility: CLINIC | Age: 30
End: 2020-12-29
Payer: MEDICAID

## 2020-12-29 PROCEDURE — 99214 OFFICE O/P EST MOD 30 MIN: CPT | Mod: 95

## 2020-12-29 NOTE — HISTORY OF PRESENT ILLNESS
[FreeTextEntry1] : Patient stopped taking levodopa 2 weeks ago to see if the benefits she experienced was real. She reports that her gait did not change since coming off of it. She is able to walk flat footed with orthotics or in sneakers; however when she tends to walk on her toes when barefooted. Her calves are sore and need to stretched in the morning and evenings. Takes flexiril 5mg prn for lower back spasms and calf soreness. Her dystonia panel was negative. \par \par Brain MRI was unremakable\par C.SPine MRI - c4/5 disc bulge with no cord compression\par \par Meds\par OCPs

## 2020-12-29 NOTE — PHYSICAL EXAM
[FreeTextEntry1] : Tremor is absent. She walks with improved heel strike and does not get up on her toes while wearing sneakers. \par

## 2020-12-29 NOTE — DISCUSSION/SUMMARY
[FreeTextEntry1] : Patient with long standing toe walking that has dystonic elements to it along with possible writer's cramp. The change of state function and stereotyped pattern of the posturing in her feet further suggests underlying dystonia. I recommended that she take 1-2 tabs of sinemet in the AM and evening to see if her barefoot walking is better with less calf spasms. We also discussed the option of BTX which she will consider in the future.  f/u 3months\par

## 2021-02-09 ENCOUNTER — TRANSCRIPTION ENCOUNTER (OUTPATIENT)
Age: 31
End: 2021-02-09

## 2021-03-23 ENCOUNTER — EMERGENCY (EMERGENCY)
Facility: HOSPITAL | Age: 31
LOS: 1 days | Discharge: HOME | End: 2021-03-23
Attending: INTERNAL MEDICINE | Admitting: INTERNAL MEDICINE
Payer: MEDICAID

## 2021-03-23 VITALS
TEMPERATURE: 97 F | WEIGHT: 212.08 LBS | SYSTOLIC BLOOD PRESSURE: 116 MMHG | DIASTOLIC BLOOD PRESSURE: 81 MMHG | HEART RATE: 82 BPM | OXYGEN SATURATION: 98 % | RESPIRATION RATE: 16 BRPM | HEIGHT: 62 IN

## 2021-03-23 DIAGNOSIS — Z90.49 ACQUIRED ABSENCE OF OTHER SPECIFIED PARTS OF DIGESTIVE TRACT: Chronic | ICD-10-CM

## 2021-03-23 LAB — SARS-COV-2 RNA SPEC QL NAA+PROBE: SIGNIFICANT CHANGE UP

## 2021-03-23 PROCEDURE — 87635 SARS-COV-2 COVID-19 AMP PRB: CPT

## 2021-03-23 PROCEDURE — 99283 EMERGENCY DEPT VISIT LOW MDM: CPT

## 2021-03-23 PROCEDURE — 99282 EMERGENCY DEPT VISIT SF MDM: CPT

## 2021-03-23 NOTE — ED PROVIDER NOTE - ATTENDING CONTRIBUTION TO CARE
requesting COVID swab, exposed 4 days ago. Patient asymptomatic. Denies fever, chills, chest pain, shortness of breath, abdominal pain, nausea, vomiting, diarrhea, weakness. Appears well. Speaking in full sentences, breathing not labored.  Dr. Hawkins:  I have reviewed and discussed with the PA/ resident the case specifics, including the history, physical assessment, evaluation, conclusion, laboratory results, and medical plan. I agree with the contents, and conclusions. I have personally examined, and interviewed the patient.

## 2021-03-23 NOTE — ED ADULT NURSE NOTE - DOES PATIENT HAVE ADVANCE DIRECTIVE
Findings concerning for polyneuropathy secondary to ETOH  But exam remains limited given withdrawal  Neuro consult appreciated  CT cerbellar and cerebral atrophy  spep - no monoclonal proteins  Further eval as pts condition allows   No

## 2021-03-23 NOTE — ED ADULT NURSE NOTE - OBJECTIVE STATEMENT
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 pt states she was with a friend whos daughter jus tested positive today. Patient asymptomatic. Denies fever, chills, chest pain, shortness of breath, abdominal pain, nausea, vomiting, diarrhea, weakness. Appears well. Speaking in full sentences, breathing not labored.

## 2021-03-23 NOTE — ED PROVIDER NOTE - NSFOLLOWUPINSTRUCTIONS_ED_ALL_ED_FT
Follow up with your PMD within 1-2 days.   Rest, increase your fluids.   Take Tylenol 650mg every 4-6 hours as needed for temp >99.9  See attached for COVID-19 info / fact sheet.   Take a Multivitamin daily.   Please STAY HOME and quarantine yourself for 14 days from onset of symptoms  We sent a test for the COVID-19 virus which takes up to 2-3 days to result. We will contact you if there are any findings. You have consented for us to text you your results.   If you have not heard from us in 5 days you can call  [phone]  Worsening, continued or ANY new concerning symptoms return to the emergency department.

## 2021-03-23 NOTE — ED PROVIDER NOTE - PATIENT PORTAL LINK FT
You can access the FollowMyHealth Patient Portal offered by [facility] by registering at the following website: [email or web address]. By joining [facility]’s FollowMyHealth portal, you will also be able to view your health information using other applications (apps) compatible with our system.

## 2021-03-23 NOTE — ED PROVIDER NOTE - OBJECTIVE STATEMENT
requesting COVID swab, exposed 4 days ago. Patient asymptomatic. Denies fever, chills, chest pain, shortness of breath, abdominal pain, nausea, vomiting, diarrhea, weakness. Appears well. Speaking in full sentences, breathing not labored.

## 2021-05-13 ENCOUNTER — APPOINTMENT (OUTPATIENT)
Dept: ORTHOPEDIC SURGERY | Facility: CLINIC | Age: 31
End: 2021-05-13
Payer: MEDICAID

## 2021-05-13 VITALS — BODY MASS INDEX: 39.01 KG/M2 | HEIGHT: 62 IN | WEIGHT: 212 LBS

## 2021-05-13 DIAGNOSIS — M75.41 IMPINGEMENT SYNDROME OF RIGHT SHOULDER: ICD-10-CM

## 2021-05-13 PROCEDURE — 73030 X-RAY EXAM OF SHOULDER: CPT | Mod: RT

## 2021-05-13 PROCEDURE — 99072 ADDL SUPL MATRL&STAF TM PHE: CPT

## 2021-05-13 PROCEDURE — 99203 OFFICE O/P NEW LOW 30 MIN: CPT

## 2021-05-13 RX ORDER — CARBIDOPA AND LEVODOPA 25; 100 MG/1; MG/1
25-100 TABLET ORAL 3 TIMES DAILY
Qty: 90 | Refills: 5 | Status: DISCONTINUED | COMMUNITY
Start: 2020-08-25 | End: 2021-05-13

## 2021-05-13 RX ORDER — CYCLOBENZAPRINE HYDROCHLORIDE 5 MG/1
5 TABLET, FILM COATED ORAL 3 TIMES DAILY
Qty: 90 | Refills: 0 | Status: DISCONTINUED | COMMUNITY
Start: 2020-09-08 | End: 2021-05-13

## 2021-05-13 NOTE — PHYSICAL EXAM
[de-identified] : Cervical Spine/Neck\par Inspection/Palpation :\par ¦ Inspection : alignment midline, normal degree of lordosis present\par ¦ Skin : normal appearance, no masses, no tenderness, trachea midline\par ¦ Palpation : musculature is nontender to palpation\par ¦ Tests and Signs : Spurling’s (-), Lhermitte’s (-) Tristin’s Reflex (-) \par ¦ Range of Motion : arc of motion full in all planes, no crepitus or pain with ROM\par ¦ Stability : no subluxations or other evidence of instability demonstrated during range of motion testing\par o Muscle Strength : paraspinal muscle strength within normal limits\par o Muscle Tone : paraspinal muscle tone within normal limits\par o Muscle Bulk : normal, no atrophy\par o Cervical Lymph Nodes : no lymphadenopathy present\par \par Right Upper Extremity\par o Shoulder :\par ¦ Inspection/Palpation :  tenderness over the greater tuberosity, no acromioclavicular joint tenderness,  tenderness anterior and posterior glenohumeral joint,no swelling, no deformities\par ¦ Range of Motion : ACTIVE FORWARD ELEVATION: Measured at 160 degrees, ACTIVE EXTERNAL ROTATION: Measured at 75 degrees, ACTIVE INTERNAL ROTATION: Measured at T7 \par ¦ Strength : external rotation 5/5, internal rotation 5/5, supraspinatus 5/5\par ¦ Stability : no joint instability on provocative testing\par ¦ Tests/Signs : Neer (+), Medina (+)\par o Upper Arm : no tenderness, no swelling, no deformities\par o Muscle Bulk : no atrophy\par o Sensation : sensation intact to light touch\par o Skin : no skin rash or discoloration\par o Vascular Exam : no edema, no cyanosis, radial and ulnar pulses normal\par \par Left Upper Extremity\par o Shoulder :\par ¦ Inspection/Palpation : no tenderness over the greater tuberosity, no acromioclavicular joint tenderness, no tenderness anterior and posterior glenohumeral joint,no swelling, no deformities\par ¦ Range of Motion :ACTIVE FORWARD ELEVATION: Measured at 160 degrees, ACTIVE EXTERNAL ROTATION: Measured at 75 degrees, ACTIVE INTERNAL ROTATION: Measured at T7 \par ¦ Strength : external rotation 5/5, internal rotation 5/5, supraspinatus 5/5\par ¦ Stability : no joint instability on provocative testing\par ¦ Tests/Signs : Neer (-), Medina (-)\par o Upper Arm : no tenderness, no swelling, no deformities\par o Muscle Bulk : no atrophy\par o Sensation : sensation intact to light touch\par o Skin : no skin rash or discoloration\par o Vascular Exam : no edema, no cyanosis, radial and ulnar pulses normal\par  [de-identified] : o Right Shoulder : Grashey, Axillary and Outlet views were obtained, there are no soft tissue abnormalities, no fractures, alignment is normal, normal appearing joint spaces, normal bone density, no bony lesions.\par \par \par ----------------------------------------------------------------------------------------------------------------------------------------------------------------------------------- \par  Patient comes to today's visit with outside imaging already performed. I reviewed the images in detail with the patient and discussed the findings as highlighted below.\par \par o MRI of the cervical spine performed on 09/03/2020 at [facility]: Findings \par \par \par ALIGNMENT: The alignment is normal. \par \par VERTEBRAE: The vertebral bodies are normal in height. There is no fracture or aggressive osseous lesion. \par \par DISCS: Mild Multilevel disc desiccation and loss of height. \par \par CORD: There is no intrinsic spinal cord signal abnormality. \par \par PARAVERTEBRAL SOFT TISSUES: The visualized paravertebral soft tissues appear within normal limits. \par \par EVALUATION OF INDIVIDUAL LEVELS: \par C2-3: No disc herniation, spinal canal or neuroforaminal stenosis. \par \par C3-4: Central disc protrusion. No spinal canal or neuroforaminal stenosis. \par \par C4-5: Minimal disc bulge. No spinal canal or neuroforaminal stenosis. \par \par C5-6: Central and right central disc protrusions. There is ventral cord indentation. No spinal canal or neuroforaminal stenosis. \par \par C6-7: No disc herniation, spinal canal or neuroforaminal stenosis. \par \par C7-T1: No disc herniation, spinal canal or neuroforaminal stenosis. \par \par IMPRESSION: \par \par -No intrinsic cervical spinal cord abnormality identified. \par \par -Disc protrusions at C5-C6 result in ventral cord indentation. No spinal canal or neuroforaminal stenosis. \par \par \par

## 2021-05-13 NOTE — HISTORY OF PRESENT ILLNESS
[de-identified] : DEBORAH HENRY is a 31 year old RHD female presenting to the office complaining of right shoulder pain. Patient reports pain began on 04/24/2021. She believes the pain started by carrying a heavy bag or luggage, or due to her occupation. 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  Patient denies injury or trauma to the area. The patient describes the pain as a dull aching, and occasionally sharp pain localized to the anterior aspect of her right shoulder that is intermittent in nature. Her  symptoms are exacerbated with any overhead movement and strenuous use of the shoulder. Patient reports the pain is not waking her up at night.  Patient reports associated weakness. Denies numbness and tingling in the upper extremity.  Patient notes she had an MRI of the cervical spine performed in September 2020 at [facility]. Patient notes PMHx of microlaminectomy of L4/L5 on 01/25/2020.Patient is taking NSAIDs for pain relief with mild to moderate relief in symptoms. Patient denies any other complaints at this time.

## 2021-05-13 NOTE — DISCUSSION/SUMMARY
[de-identified] : The underlying pathophysiology was reviewed in great detail with the patient as well as the various treatment options, including ice, analgesics, NSAIDs, Physical therapy, steroid injections.\par \par MRI of the cervical spine was reviewed and discussed in great detail today. \par \par A home exercise sheet was given and discussed with the patient to follow.A Thera-Band was provided for exercise program. \par \par Activity modifications and restrictions were discussed. I advised avoiding overhead lifting. I advised the patient to work on good posture.\par \par A prescription was provided for Diclofenac 75 mg. \par \par FU 6 weeks. \par \par All questions were answered, all alternatives discussed and the patient is in complete agreement with that plan. Follow-up appointment as instructed. Any issues and the patient will call or come in sooner.

## 2021-11-16 ENCOUNTER — NON-APPOINTMENT (OUTPATIENT)
Age: 31
End: 2021-11-16

## 2021-11-24 ENCOUNTER — OUTPATIENT (OUTPATIENT)
Dept: OUTPATIENT SERVICES | Facility: HOSPITAL | Age: 31
LOS: 1 days | End: 2021-11-24
Payer: COMMERCIAL

## 2021-11-24 DIAGNOSIS — Z00.00 ENCOUNTER FOR GENERAL ADULT MEDICAL EXAMINATION WITHOUT ABNORMAL FINDINGS: ICD-10-CM

## 2021-11-24 DIAGNOSIS — Z90.49 ACQUIRED ABSENCE OF OTHER SPECIFIED PARTS OF DIGESTIVE TRACT: Chronic | ICD-10-CM

## 2021-11-24 DIAGNOSIS — E66.01 MORBID (SEVERE) OBESITY DUE TO EXCESS CALORIES: ICD-10-CM

## 2021-11-24 PROCEDURE — 83036 HEMOGLOBIN GLYCOSYLATED A1C: CPT

## 2021-11-24 PROCEDURE — 82306 VITAMIN D 25 HYDROXY: CPT

## 2021-11-24 PROCEDURE — 80061 LIPID PANEL: CPT

## 2021-11-24 PROCEDURE — 84443 ASSAY THYROID STIM HORMONE: CPT

## 2021-11-24 PROCEDURE — 85025 COMPLETE CBC W/AUTO DIFF WBC: CPT

## 2021-11-24 PROCEDURE — 81001 URINALYSIS AUTO W/SCOPE: CPT

## 2021-11-24 PROCEDURE — 80053 COMPREHEN METABOLIC PANEL: CPT

## 2021-12-14 ENCOUNTER — APPOINTMENT (OUTPATIENT)
Dept: ENDOCRINOLOGY | Facility: CLINIC | Age: 31
End: 2021-12-14
Payer: COMMERCIAL

## 2022-01-04 ENCOUNTER — APPOINTMENT (OUTPATIENT)
Dept: ENDOCRINOLOGY | Facility: CLINIC | Age: 32
End: 2022-01-04
Payer: COMMERCIAL

## 2022-01-04 VITALS
TEMPERATURE: 98.2 F | DIASTOLIC BLOOD PRESSURE: 78 MMHG | HEART RATE: 64 BPM | WEIGHT: 208 LBS | HEIGHT: 62 IN | SYSTOLIC BLOOD PRESSURE: 116 MMHG | BODY MASS INDEX: 38.28 KG/M2 | OXYGEN SATURATION: 98 %

## 2022-01-04 PROCEDURE — 99204 OFFICE O/P NEW MOD 45 MIN: CPT

## 2022-01-05 ENCOUNTER — OUTPATIENT (OUTPATIENT)
Dept: OUTPATIENT SERVICES | Facility: HOSPITAL | Age: 32
LOS: 1 days | End: 2022-01-05
Payer: COMMERCIAL

## 2022-01-05 DIAGNOSIS — Z90.49 ACQUIRED ABSENCE OF OTHER SPECIFIED PARTS OF DIGESTIVE TRACT: Chronic | ICD-10-CM

## 2022-01-05 DIAGNOSIS — E28.2 POLYCYSTIC OVARIAN SYNDROME: ICD-10-CM

## 2022-01-05 PROCEDURE — 82043 UR ALBUMIN QUANTITATIVE: CPT

## 2022-01-07 LAB
DHEA-S SERPL-MCNC: 267 UG/DL
FSH SERPL-MCNC: 4.7 IU/L
INSULIN SERPL-MCNC: 10.4 UU/ML
LH SERPL-ACNC: 11.2 IU/L
PROLACTIN SERPL-MCNC: 19.2 NG/ML
TESTOST FREE SERPL-MCNC: 4.6 PG/ML
TESTOST SERPL-MCNC: 46.9 NG/DL

## 2022-01-07 NOTE — ASSESSMENT
[Weight Loss] : weight loss [FreeTextEntry1] : 1. PCOS\par Not currently interested in pregnancy at this time\par Currently on OCPs, which has helped decrease hair growth on face and normalize her menstrual periods, she is following with ob/gyn\par Bloodwork was collected in office today including free and total testosterone, 17hydroxyprogesterone to r/o NCCAH, SHBG, DHEAS, will f/u results (explained to patient testosterone levels will be affected likely due to use of OCPs, pt understood)\par Will consider use of  Metformin to restore ovulatory menses/insulin resistance\par \par 2. BMI 38\par Patient is following with [facility], recently started on phenteramine medication for past 2 months with noted weight loss of 10lbs so far.\par Discussed extensively with patient need for healthy eating habits, portion control, low carb/low calorie options.\par \par Answered all questions today; patient verbalized understanding.\par RTC in 3 months. \par

## 2022-01-07 NOTE — PHYSICAL EXAM
[Alert] : alert [Well Nourished] : well nourished [Obese] : obese [No Acute Distress] : no acute distress [Well Developed] : well developed [Normal Sclera/Conjunctiva] : normal sclera/conjunctiva [EOMI] : extra ocular movement intact [No Proptosis] : no proptosis [No Lid Lag] : no lid lag [Normal Hearing] : hearing was normal [No LAD] : no lymphadenopathy [Supple] : the neck was supple [Thyroid Not Enlarged] : the thyroid was not enlarged [No Thyroid Nodules] : no palpable thyroid nodules [No Respiratory Distress] : no respiratory distress [No Accessory Muscle Use] : no accessory muscle use [Normal Rate and Effort] : normal respiratory rate and effort [Clear to Auscultation] : lungs were clear to auscultation bilaterally [Normal S1, S2] : normal S1 and S2 [No Murmurs] : no murmurs [Normal Rate] : heart rate was normal [Regular Rhythm] : with a regular rhythm [No Edema] : no peripheral edema [Normal Bowel Sounds] : normal bowel sounds [Not Tender] : non-tender [Not Distended] : not distended [Soft] : abdomen soft [No Stigmata of Cushings Syndrome] : no stigmata of Cushings Syndrome [Normal Gait] : normal gait [No Clubbing, Cyanosis] : no clubbing  or cyanosis of the fingernails [No Involuntary Movements] : no involuntary movements were seen [No Rash] : no rash [No Skin Lesions] : no skin lesions [Abdominal Striae] : no abdominal striae [Acanthosis Nigricans] : acanthosis nigricans present [Acne] : no acne [Hirsutism] : hirsutism present [Normal Reflexes] : deep tendon reflexes were 2+ and symmetric [No Tremors] : no tremors [Oriented x3] : oriented to person, place, and time [Normal Affect] : the affect was normal [Normal Insight/Judgement] : insight and judgment were intact [Normal Mood] : the mood was normal [de-identified] : tiny terminal hairs under chin and upper lip

## 2022-01-07 NOTE — HISTORY OF PRESENT ILLNESS
[FreeTextEntry1] : DEBORAH HENRY is a 32 yo female with past medical  history of depression, PCOS, who presents for management of PCOS.\par \par Patient notes she had menstrual irregularities in the past, however now cycles have ormalized with use of OCPs, following with OB/GYN. LMP is now, she notes she is in a relationship but not ready for pregnancy yet but maybe in few years. She notes struggle with abnormal facial hair, she notes she used to have a beard-like distribution under her chin, upper lip and side burns, she used to shave now went through laser therapy and does note increase in hair growth since then. She denies presence of facial or body acne, notes she has gained 50lbs over past few years and trouble losing weight. \par She is seeing weight loss clinic, for which she was started on phentermine started since , and losing lbs. She was never on weight loss medication before, she also started exercising again after a long period of inactivity, used to kickbox. Recently had back surgery in 2020 which limited her mobility and ability to exercise during the past 1.5 years.\par Typical diet includes: breakfast: cereal bar, toast with jam, \par lunch: 1/2 half sandwich turkey 3oz1 10x cheese, mustard and yogurt \par dinner: 400calories. chicken with vegetables. 1/4 cup rice \par She admits she used to binge eating - used to eat one big meal in afternon/evening however stopped in 2021 once she went to  Weight loss clinic\par \par PMH: as noted above \par PSH: gallbladder in 2016 \par Family Hx: maternal GF and Dad -type 2 DM, no known thyroid disease, mom- , drug abuse in both parents\par ALL: NKDA\par Social Hx: quit tobacco 3 years ago (10 years), social ETOH 1x/month, medical marijuana. works in physical therapy \par Home Meds: MVI, phentermine, Metamucil, OCPs

## 2022-01-13 LAB — 17OHP SERPL-MCNC: 35 NG/DL

## 2022-03-24 ENCOUNTER — APPOINTMENT (OUTPATIENT)
Dept: NEUROSURGERY | Facility: CLINIC | Age: 32
End: 2022-03-24
Payer: COMMERCIAL

## 2022-03-24 VITALS
BODY MASS INDEX: 38.28 KG/M2 | OXYGEN SATURATION: 96 % | WEIGHT: 208 LBS | SYSTOLIC BLOOD PRESSURE: 114 MMHG | DIASTOLIC BLOOD PRESSURE: 69 MMHG | TEMPERATURE: 97.9 F | HEIGHT: 62 IN | HEART RATE: 100 BPM

## 2022-03-24 PROCEDURE — 99214 OFFICE O/P EST MOD 30 MIN: CPT

## 2022-03-24 NOTE — ASSESSMENT
[FreeTextEntry1] : IMPRESSION:\par \par  29 year old female with no significant PMHx who had undergone RT L4- 5 microdiscectomy for right lower extremity radiculopathy and L4- 5 disc herniation. Pt recovered well and was discharged with on Flexeril 5 mg PRN on 12/4/2020. Pt returns with pain in the right side of her lower back, since last week.. Pt getting spasms.Pt was given Flexeril by the PCP, But not started yet. Pt denies any numbness or tingling.Pt has no recent imaging. \par \par PLAN:\par \par MRI Lumbar spine w/o contrast \par CT Lumbar spine w/o contrast \par X ray L spine with Flex/ ext\par [facility] for back for strengthening and modalities , 2-3 times/week x 8 weeks. \par Continue Cyclobenzaprine 7.5 mg TID for spasms.\par F/U with Dr. Samuel in 2-3 weeks after the imaging.

## 2022-03-24 NOTE — PHYSICAL EXAM
[General Appearance - Alert] : alert [General Appearance - In No Acute Distress] : in no acute distress [General Appearance - Well Nourished] : well nourished [General Appearance - Well-Appearing] : healthy appearing [Oriented To Time, Place, And Person] : oriented to person, place, and time [Impaired Insight] : insight and judgment were intact [Affect] : the affect was normal [Memory Recent] : recent memory was not impaired [Person] : oriented to person [Place] : oriented to place [Time] : oriented to time [Short Term Intact] : short term memory intact [Motor Tone] : muscle tone was normal in all four extremities [Motor Strength] : muscle strength was normal in all four extremities [Sensation Tactile Decrease] : light touch was intact [Sensation Pain / Temperature Decrease] : pain and temperature was intact [Balance] : balance was intact [Sclera] : the sclera and conjunctiva were normal [PERRL With Normal Accommodation] : pupils were equal in size, round, reactive to light, with normal accommodation [Outer Ear] : the ears and nose were normal in appearance [Both Tympanic Membranes Were Examined] : both tympanic membranes were normal [Neck Appearance] : the appearance of the neck was normal [] : no respiratory distress [Respiration, Rhythm And Depth] : normal respiratory rhythm and effort [Apical Impulse] : the apical impulse was normal [Heart Rate And Rhythm] : heart rate was normal and rhythm regular [Arterial Pulses Carotid] : carotid pulses were normal with no bruits [Abdominal Aorta] : the abdominal aorta was normal [No CVA Tenderness] : no ~M costovertebral angle tenderness [No Spinal Tenderness] : no spinal tenderness [Abnormal Walk] : normal gait [Nail Clubbing] : no clubbing  or cyanosis of the fingernails [Involuntary Movements] : no involuntary movements were seen [Skin Color & Pigmentation] : normal skin color and pigmentation [2+] : Brachioradialis left 2+ [1+] : Patella left 1+ [0] : Ankle jerk left 0

## 2022-03-24 NOTE — REVIEW OF SYSTEMS
[Negative] : Heme/Lymph [Abnormal Sensation] : an abnormal sensation [Joint Pain] : joint pain [FreeTextEntry9] : r

## 2022-03-24 NOTE — HISTORY OF PRESENT ILLNESS
[FreeTextEntry1] : Ms. Shi is a 29 year old female with no significant PMHx who had undergone RT L4-5 microdiscectomy for right lower extremity radiculopathy. She presented with acute onset of severe R lower lumbar back pain with radiation down R thigh to outside of R calf associated with severe muscle spasms and paresthesias down the R leg while trying to uncork a bottle in an awkward position. She was found with L4-5 disc herniation and admitted on 1/22/20. On 1/25/20 she underwent RT L4-5 microdiscectomy. Hospital course uncomplicated. Discharged home on 01/26/2020 and had her 1 st follow up appointment done in 2/7/2020. RLE radiculopathy significantly improved post-operatively. Pt had a visit on 9/8/20 with pain and spasm which is currently being managed with muscle relaxant and PT. Advised Xray lumbar with follow up. Pt was discharged with on Flexeril 5 mg PRN ON 12/4/2020. \par \par Today, pt returns with lower back pain.She reports that she was doing very well for the past 2 years , working and in school full time.She lost 40 lbs , now working with [employer] weight watchers for maintaining her weight.  She reports feeling pain in the right side of her lower back, since last week. Was doing a lot of work around the house.Pain is more to the right  side of the lower back towards the hip. Pt getting spasms at night. Pt was given Flexeril by the PCP, But not started yet. Pt denies any numbness or tingling down her legs. Pt denies any bowel and bladder incontinence or saddle anesthesia.

## 2022-04-04 ENCOUNTER — APPOINTMENT (OUTPATIENT)
Dept: RADIOLOGY | Facility: HOSPITAL | Age: 32
End: 2022-04-04
Payer: COMMERCIAL

## 2022-04-04 ENCOUNTER — APPOINTMENT (OUTPATIENT)
Dept: MRI IMAGING | Facility: HOSPITAL | Age: 32
End: 2022-04-04
Payer: COMMERCIAL

## 2022-04-04 ENCOUNTER — APPOINTMENT (OUTPATIENT)
Dept: CT IMAGING | Facility: HOSPITAL | Age: 32
End: 2022-04-04
Payer: COMMERCIAL

## 2022-04-04 ENCOUNTER — OUTPATIENT (OUTPATIENT)
Dept: OUTPATIENT SERVICES | Facility: HOSPITAL | Age: 32
LOS: 1 days | End: 2022-04-04
Payer: COMMERCIAL

## 2022-04-04 DIAGNOSIS — Z90.49 ACQUIRED ABSENCE OF OTHER SPECIFIED PARTS OF DIGESTIVE TRACT: Chronic | ICD-10-CM

## 2022-04-04 DIAGNOSIS — M54.16 RADICULOPATHY, LUMBAR REGION: ICD-10-CM

## 2022-04-04 DIAGNOSIS — Z98.890 OTHER SPECIFIED POSTPROCEDURAL STATES: ICD-10-CM

## 2022-04-04 PROCEDURE — 72148 MRI LUMBAR SPINE W/O DYE: CPT | Mod: 26

## 2022-04-04 PROCEDURE — 72148 MRI LUMBAR SPINE W/O DYE: CPT

## 2022-04-04 PROCEDURE — 72131 CT LUMBAR SPINE W/O DYE: CPT | Mod: 26,MH

## 2022-04-04 PROCEDURE — 72131 CT LUMBAR SPINE W/O DYE: CPT | Mod: MH

## 2022-04-04 PROCEDURE — 72110 X-RAY EXAM L-2 SPINE 4/>VWS: CPT

## 2022-04-04 PROCEDURE — 72110 X-RAY EXAM L-2 SPINE 4/>VWS: CPT | Mod: 26

## 2022-04-08 ENCOUNTER — TRANSCRIPTION ENCOUNTER (OUTPATIENT)
Age: 32
End: 2022-04-08

## 2022-04-11 ENCOUNTER — APPOINTMENT (OUTPATIENT)
Dept: NEUROSURGERY | Facility: CLINIC | Age: 32
End: 2022-04-11
Payer: COMMERCIAL

## 2022-04-11 VITALS
TEMPERATURE: 98 F | HEART RATE: 82 BPM | SYSTOLIC BLOOD PRESSURE: 117 MMHG | OXYGEN SATURATION: 96 % | WEIGHT: 208 LBS | HEIGHT: 62 IN | BODY MASS INDEX: 38.28 KG/M2 | DIASTOLIC BLOOD PRESSURE: 79 MMHG

## 2022-04-11 PROCEDURE — 99213 OFFICE O/P EST LOW 20 MIN: CPT

## 2022-04-11 NOTE — PHYSICAL EXAM
[General Appearance - Alert] : alert [General Appearance - In No Acute Distress] : in no acute distress [General Appearance - Well Nourished] : well nourished [General Appearance - Well-Appearing] : healthy appearing [Oriented To Time, Place, And Person] : oriented to person, place, and time [Affect] : the affect was normal [Memory Recent] : recent memory was not impaired [Person] : oriented to person [Place] : oriented to place [Time] : oriented to time [Short Term Intact] : short term memory intact [Motor Tone] : muscle tone was normal in all four extremities [Motor Strength] : muscle strength was normal in all four extremities [Sensation Tactile Decrease] : light touch was intact [Sensation Pain / Temperature Decrease] : pain and temperature was intact [Balance] : balance was intact [2+] : Brachioradialis left 2+ [1+] : Ankle jerk left 1+ [Outer Ear] : the ears and nose were normal in appearance [Both Tympanic Membranes Were Examined] : both tympanic membranes were normal [Neck Appearance] : the appearance of the neck was normal [] : no respiratory distress [Respiration, Rhythm And Depth] : normal respiratory rhythm and effort [Apical Impulse] : the apical impulse was normal [Heart Rate And Rhythm] : heart rate was normal and rhythm regular [Arterial Pulses Carotid] : carotid pulses were normal with no bruits [Abdominal Aorta] : the abdominal aorta was normal [No CVA Tenderness] : no ~M costovertebral angle tenderness [Abnormal Walk] : normal gait [Nail Clubbing] : no clubbing  or cyanosis of the fingernails [Involuntary Movements] : no involuntary movements were seen [Skin Color & Pigmentation] : normal skin color and pigmentation

## 2022-04-17 NOTE — HISTORY OF PRESENT ILLNESS
[FreeTextEntry1] : Ms. Shi is a 29 year old female with no significant PMHx who had undergone RT L4-5 microdiscectomy for right lower extremity radiculopathy. She presented with acute onset of severe R lower lumbar back pain with radiation down R thigh to outside of R calf associated with severe muscle spasms and paresthesias down the R leg while trying to uncork a bottle in an awkward position. She was found with L4-5 disc herniation and admitted on 1/22/20. On 1/25/20 she underwent RT L4-5 microdiscectomy. Hospital course uncomplicated. Discharged home on 01/26/2020 and had her 1 st follow up appointment done in 2/7/2020. RLE radiculopathy significantly improved post-operatively. Pt had a visit on 9/8/20 with pain and spasm which is currently being managed with muscle relaxant and PT. Advised Xray lumbar with follow up. Pt was discharged with on Flexeril 5 mg PRN ON 12/4/2020. \par \par Today, pt returns after the imaging. Pt still has back pain but better than few days ago. Pt used to have pain in the morning which gets better as day progresses. Pt was taking Flexeril and now feel like does not need it. Pt denies any numbness or tingling down her legs. Pt denies any bowel and bladder incontinence or saddle anesthesia. \par

## 2022-04-17 NOTE — ASSESSMENT
[FreeTextEntry1] : IMPRESSION:\par \par  29 year old female with no significant PMHx who had undergone RT L4- 5 microdiscectomy for right lower extremity radiculopathy and L4- 5 disc herniation. Pt recovered well and was discharged with on Flexeril 5 mg PRN on 12/4/2020. Pt returns with pain in the right side of her lower back, but getting better with PT. Pt denies any numbness or tingling. MRI L spine with  small disc bulge with superimposed central/right paracentral extrusion resulting in indentation of the thecal sac, mild right lateral recess narrowing and mild bilateral foraminal narrowing at the level of surgery. importantly, there are new Modic changes at L45 and decrease in disc space height, which may indicate microinstability at this level.  She is feeling better at present, so we will continue to observe with conservative management. \par \par PLAN:\par \par Continue [facility] for back for strengthening and modalities , 2-3 times/week x 8 weeks. \par Continue Cyclobenzaprine 7.5 mg TID for spasms.\par F/U with Dr. Samuel in 2 months.

## 2022-04-17 NOTE — RESULTS/DATA
[FreeTextEntry1] : ACC: [id number] EXAM: MR SPINE LUMBAR\par \par PROCEDURE DATE: 04/04/2022\par \par \par \par INTERPRETATION: LUMBOSACRAL SPINE MRI\par \par CLINICAL INFORMATION: Right-sided pain x2-3 weeks and radiculopathy. History of laminectomy in 2020\par TECHNIQUE: Multiplanar, multisequence MR imaging was obtained of the lumbosacral spine.\par \par COMPARISON: Lumbar spine MRI 1/21/2020\par \par FINDINGS:\par \par DISC LEVEL EVALUATION:\par \par L1/L2: Normal\par L2/L3: Normal\par L3/L4: Normal\par L4/L5: Interval right laminectomy and resection of extrusion. Small disc bulge with superimposed central/right paracentral extrusion and mild facet arthrosis resulting in indentation of the thecal sac, mild right lateral recess narrowing and mild foraminal narrowing.\par L5/S1: Normal\par \par SPINAL ALIGNMENT: Preservation of the vertebral body heights. Straightening of the lordosis. No listhesis.\par DISTAL CORD AND CONUS: Conus terminates at L1. Cord and cauda equina are unremarkable.\par SI JOINTS: Intact.\par MARROW: Fatty endplate changes are present at L5/S1. No marrow edema\par PARASPINAL MUSCLE AND SOFT TISSUES: Unremarkable\par INTRAABDOMINAL/INTRAPELVIC SOFT TISSUES: Unremarkable.\par \par IMPRESSION:\par Status post right laminectomy at L4/L5 and resection of extrusion.\par At this level, small disc bulge with superimposed central/right paracentral extrusion resulting in indentation of the thecal sac, mild right lateral recess narrowing and mild bilateral foraminal narrowing.\par [New Modic changes and decrease in disc height L45]\par \par ACC: [id number] EXAM: CT LUMBAR SPINE\par \par PROCEDURE DATE: 04/04/2022\par \par \par \par INTERPRETATION: LUMBOSACRAL SPINE CT\par \par CLINICAL INFORMATION: New right-sided back pain. Status post right-sided laminectomy\par TECHNIQUE: Axial CT images were obtained of the lumbosacral spine with coronal and sagittal reconstructions.\par \par COMPARISON: Lumbar spine MRI performed the same day and lumbar spine CT 1/20/2020\par \par FINDINGS:\par \par DISC LEVEL EVALUATION:\par \par L1/L2: Normal\par L2/L3: Normal\par L3/L4: Normal\par L4/L5: Interval right laminectomy and resection of extrusion. Small disc bulge with superimposed central/right paracentral extrusion and mild facet arthrosis resulting in indentation of the thecal sac, mild right lateral recess narrowing and mild foraminal narrowing.\par L5/S1: Normal\par \par SPINAL ALIGNMENT: Preservation of the vertebral body heights. Straightening of the lordosis. Loss of intervertebral disc height at L4/L5 with small focus of vacuum phenomenon.\par SI JOINTS: Intact.\par PARASPINAL MUSCLE AND SOFT TISSUES: Unremarkable\par INTRAABDOMINAL/INTRAPELVIC SOFT TISSUES: Unremarkable.\par \par IMPRESSION:\par Status post right laminectomy at L4/L5 and resection of extrusion.\par At this level, small disc bulge with superimposed central/right paracentral extrusion resulting in indentation of the thecal sac, mild right lateral recess narrowing and mild bilateral foraminal narrowing.

## 2022-05-09 ENCOUNTER — APPOINTMENT (OUTPATIENT)
Dept: ENDOCRINOLOGY | Facility: CLINIC | Age: 32
End: 2022-05-09
Payer: COMMERCIAL

## 2022-05-09 VITALS
SYSTOLIC BLOOD PRESSURE: 116 MMHG | HEIGHT: 62 IN | WEIGHT: 175.2 LBS | OXYGEN SATURATION: 97 % | BODY MASS INDEX: 32.24 KG/M2 | HEART RATE: 86 BPM | DIASTOLIC BLOOD PRESSURE: 64 MMHG

## 2022-05-09 PROCEDURE — 99213 OFFICE O/P EST LOW 20 MIN: CPT

## 2022-05-15 NOTE — ASSESSMENT
[FreeTextEntry1] : 1. PCOS\par Currently on OCPs, which has helped improved hirsutism and irregular periods, periods are now regular, following with Ob/gyn\par Previous hormonal bloodwork negative, insulin level normal.\par Not currently interested in pregnancy at this time.\par \par 2. weight management\par Patient is following with [facility], on phentermine since Nov 2021, noted weight loss of 50lbs thus far.\par Patient tolerating phentermine well, without side effects, satisfied with weight loss thus far. \par Encouraged to continue with lifestyle modifications with diet and exercise, BMI has improved from 38 to 32 today.\par \par Answered all questions today; patient verbalized understanding.\par RTC in 3-6 months. [Weight Loss] : weight loss

## 2022-05-15 NOTE — PHYSICAL EXAM
[Alert] : alert [Well Nourished] : well nourished [Obese] : obese [Well Developed] : well developed [No Acute Distress] : no acute distress [Normal Sclera/Conjunctiva] : normal sclera/conjunctiva [EOMI] : extra ocular movement intact [No Proptosis] : no proptosis [No Lid Lag] : no lid lag [Normal Hearing] : hearing was normal [No LAD] : no lymphadenopathy [Thyroid Not Enlarged] : the thyroid was not enlarged [Supple] : the neck was supple [No Thyroid Nodules] : no palpable thyroid nodules [No Respiratory Distress] : no respiratory distress [No Accessory Muscle Use] : no accessory muscle use [Normal Rate and Effort] : normal respiratory rate and effort [Clear to Auscultation] : lungs were clear to auscultation bilaterally [Normal S1, S2] : normal S1 and S2 [Normal Rate] : heart rate was normal [No Murmurs] : no murmurs [Regular Rhythm] : with a regular rhythm [No Edema] : no peripheral edema [Normal Bowel Sounds] : normal bowel sounds [Not Tender] : non-tender [Not Distended] : not distended [Soft] : abdomen soft [No Stigmata of Cushings Syndrome] : no stigmata of Cushings Syndrome [Normal Gait] : normal gait [No Involuntary Movements] : no involuntary movements were seen [No Clubbing, Cyanosis] : no clubbing  or cyanosis of the fingernails [No Rash] : no rash [No Skin Lesions] : no skin lesions [Acanthosis Nigricans] : acanthosis nigricans present [Hirsutism] : hirsutism present [Normal Reflexes] : deep tendon reflexes were 2+ and symmetric [No Tremors] : no tremors [Oriented x3] : oriented to person, place, and time [Normal Affect] : the affect was normal [Normal Insight/Judgement] : insight and judgment were intact [Normal Mood] : the mood was normal [Abdominal Striae] : no abdominal striae [Acne] : no acne [de-identified] : tiny terminal hairs under chin and upper lip

## 2022-05-15 NOTE — HISTORY OF PRESENT ILLNESS
[FreeTextEntry1] : This is a 31 yo female with history of depression, PCOS and right lower extremity radiculopathy who presents for follow up. \par \par At last endocrine visit in Jan 2022, patient has been on Junel OCPs which has helped decrease hair growth on face and help normalize her menstrual periods. LMP is 4/5/22. She has also had largely negative hormonal workup (noted obtained while on OCPs), normal testosterone and DHEAS and normal 17-hydroxyprogesterone levels. Normal insulin level. \par She is also following with  weight loss clinic, currently on Phentermine, she notes satisfaction with medication as she has lost 50lbs since starting, BMI is currently 32.\par She notes her current diet is about 1000cal/day, planning to join gym and increase physical activity levels.

## 2022-05-18 LAB
ALBUMIN SERPL ELPH-MCNC: 4.4 G/DL
ALP BLD-CCNC: 49 U/L
ALT SERPL-CCNC: 22 U/L
ANION GAP SERPL CALC-SCNC: 14 MMOL/L
AST SERPL-CCNC: 22 U/L
BILIRUB SERPL-MCNC: 0.5 MG/DL
BUN SERPL-MCNC: 12 MG/DL
CALCIUM SERPL-MCNC: 9.4 MG/DL
CHLORIDE SERPL-SCNC: 104 MMOL/L
CO2 SERPL-SCNC: 23 MMOL/L
CREAT SERPL-MCNC: 0.8 MG/DL
EGFR: 100 ML/MIN/1.73M2
ESTIMATED AVERAGE GLUCOSE: 97 MG/DL
GLUCOSE SERPL-MCNC: 81 MG/DL
HBA1C MFR BLD HPLC: 5 %
POTASSIUM SERPL-SCNC: 3.9 MMOL/L
PROT SERPL-MCNC: 6.6 G/DL
SODIUM SERPL-SCNC: 140 MMOL/L
T4 FREE SERPL-MCNC: 1.2 NG/DL
TSH SERPL-ACNC: 1.41 UIU/ML

## 2022-06-16 ENCOUNTER — RESULT REVIEW (OUTPATIENT)
Age: 32
End: 2022-06-16

## 2022-07-23 ENCOUNTER — NON-APPOINTMENT (OUTPATIENT)
Age: 32
End: 2022-07-23

## 2022-08-25 ENCOUNTER — RESULT REVIEW (OUTPATIENT)
Age: 32
End: 2022-08-25

## 2022-12-03 ENCOUNTER — NON-APPOINTMENT (OUTPATIENT)
Age: 32
End: 2022-12-03

## 2022-12-13 ENCOUNTER — NON-APPOINTMENT (OUTPATIENT)
Age: 32
End: 2022-12-13

## 2023-01-17 ENCOUNTER — NON-APPOINTMENT (OUTPATIENT)
Age: 33
End: 2023-01-17

## 2023-02-07 ENCOUNTER — APPOINTMENT (OUTPATIENT)
Dept: BARIATRICS | Facility: CLINIC | Age: 33
End: 2023-02-07
Payer: COMMERCIAL

## 2023-02-07 VITALS
BODY MASS INDEX: 31.28 KG/M2 | TEMPERATURE: 97.4 F | HEART RATE: 79 BPM | WEIGHT: 170 LBS | SYSTOLIC BLOOD PRESSURE: 117 MMHG | OXYGEN SATURATION: 98 % | HEIGHT: 62 IN | DIASTOLIC BLOOD PRESSURE: 72 MMHG

## 2023-02-07 PROCEDURE — 99205 OFFICE O/P NEW HI 60 MIN: CPT

## 2023-02-07 PROCEDURE — G0447 BEHAVIOR COUNSEL OBESITY 15M: CPT | Mod: 59

## 2023-02-22 LAB
ALBUMIN SERPL ELPH-MCNC: 4.3 G/DL
ALP BLD-CCNC: 42 U/L
ALT SERPL-CCNC: 16 U/L
ANION GAP SERPL CALC-SCNC: 9 MMOL/L
AST SERPL-CCNC: 17 U/L
BASOPHILS # BLD AUTO: 0.02 K/UL
BASOPHILS NFR BLD AUTO: 0.4 %
BILIRUB SERPL-MCNC: 0.4 MG/DL
BUN SERPL-MCNC: 15 MG/DL
CALCIUM SERPL-MCNC: 9.2 MG/DL
CHLORIDE SERPL-SCNC: 109 MMOL/L
CHOLEST SERPL-MCNC: 180 MG/DL
CO2 SERPL-SCNC: 26 MMOL/L
CREAT SERPL-MCNC: 0.81 MG/DL
EGFR: 99 ML/MIN/1.73M2
EOSINOPHIL # BLD AUTO: 0.16 K/UL
EOSINOPHIL NFR BLD AUTO: 2.9 %
ESTIMATED AVERAGE GLUCOSE: 97 MG/DL
GLUCOSE SERPL-MCNC: 92 MG/DL
HBA1C MFR BLD HPLC: 5 %
HCT VFR BLD CALC: 39.4 %
HDLC SERPL-MCNC: 90 MG/DL
HGB BLD-MCNC: 13.2 G/DL
IMM GRANULOCYTES NFR BLD AUTO: 0.2 %
INSULIN P FAST SERPL-ACNC: 7.8 UU/ML
LDLC SERPL CALC-MCNC: 79 MG/DL
LYMPHOCYTES # BLD AUTO: 2.31 K/UL
LYMPHOCYTES NFR BLD AUTO: 42 %
MAN DIFF?: NORMAL
MCHC RBC-ENTMCNC: 31.7 PG
MCHC RBC-ENTMCNC: 33.5 GM/DL
MCV RBC AUTO: 94.7 FL
MONOCYTES # BLD AUTO: 0.39 K/UL
MONOCYTES NFR BLD AUTO: 7.1 %
NEUTROPHILS # BLD AUTO: 2.61 K/UL
NEUTROPHILS NFR BLD AUTO: 47.4 %
NONHDLC SERPL-MCNC: 91 MG/DL
PLATELET # BLD AUTO: 221 K/UL
POTASSIUM SERPL-SCNC: 4.4 MMOL/L
PROT SERPL-MCNC: 6.3 G/DL
RBC # BLD: 4.16 M/UL
RBC # FLD: 12.9 %
SODIUM SERPL-SCNC: 144 MMOL/L
T4 FREE SERPL-MCNC: 1.2 NG/DL
T4 SERPL-MCNC: 7.3 UG/DL
TRIGL SERPL-MCNC: 61 MG/DL
TSH SERPL-ACNC: 1.57 UIU/ML
WBC # FLD AUTO: 5.5 K/UL

## 2023-02-22 RX ORDER — PHENTERMINE HYDROCHLORIDE 30 MG/1
30 CAPSULE ORAL
Qty: 30 | Refills: 0 | Status: DISCONTINUED | COMMUNITY
Start: 2022-12-30 | End: 2023-02-22

## 2023-03-08 NOTE — ADDENDUM
[FreeTextEntry1] : WOULD NOT GIVE GLP1 (SAXENDA OR WEGOVY) GIVEN H/O COLORECTAL ISSUES. CONTRAVE IS BETTER CHOICE GIVEN H/O DEPRESSION/BINGE EATING

## 2023-03-08 NOTE — HISTORY OF PRESENT ILLNESS
[Improved Health] : Improved health [Time management] : time management [Cravings] : cravings [Depression/anxiety] : depression/anxiety [9] : 9 [I usually sleep 6-8 hours] : I usually sleep 6-8 hours [2+ miles] : Walking distance capability: 2+ miles [Walking] : walking [Gym classes] : gym classes [0] : 0 [None] : none [Young Adult] : yound adult [Cut/Track Calories] : cut/track calories [Low Carb Diet (Atkins/Keto)] : low carb diet (Atkins/Keto) [Prescription Medications: _____] : prescription medications: [unfilled] [FreeTextEntry2] : 160 [FreeTextEntry3] : 250 [] : No [FreeTextEntry1] : 33 year old female with PCOS presents with her spouse for evaluation of weight gain. She reports that she began to struggle with her weight as a teen. She was her heaviest in high school at 260 lbs. In college she was able to go down to 180 lb with strict diet and exercise. She slowly regained the weight and weight back up to 250 lbs. She started the Keto diet and was able to get down to 225 lbs. In 11/2021 she was started on phentermine and went down to 165 lbs in 7/2022 while being seen in the  weight loss clinic. She has gained 5 lbs since being off of the phentermine. \par Of note, was found to have a fistula and had that fixed. Recently had to have the fistula fixed again as well as a sphincterectomy. This has impacted her eating. She feels nervous to eat outside in public in fear that she will need to go to the bathroom urgently. She also endorses some dietary indiscretion from time to time. She has a very busy schedule (works 6 days a week) and does not always have time to meal prep. She stopped exercising in 2020 due to LBP. \par She does suffer from depression/anxiety. She admits to smoking marijuana daily before bed. Does not do it earlier in fear of craving foods and gaining weight.

## 2023-03-08 NOTE — ASSESSMENT
[FreeTextEntry1] : Patient with BMI of 31.09 kg/m2. We discussed the importance of weight loss in the management of her comorbidities. We discussed the risks of continued excess weight on long term health.  We discussed at length the importance of following a carbohydrate balanced diet and the importance of incorporating protein in meals. We also discussed appropriate alternative food choices. She met with the RD to discuss dietary changes she can make. We discussed ways she can incorporate exercise into her daily routine given her limitations. At least 15 minutes was spent during the visit on obesity counseling. \par \par Discussed role of PCOS in weight gain. In terms of medications to supplement lifestyle modification efforts, we discussed restarting phentermine. We also spoke about risks and benefits of therapy with GLP1RA were discussed. Special considerations while on it were also discussed. Would be very careful given h/o constipation. She will also s/w colorectal surgeon for clearance for GLP1RA. Pending labs will decide on medication treatment options. Patient provided with information.\par \par Patient to complete labs, will call with the results. \par \par Emotional support provided. All of her questions were answered. \par \par f/u pending treatment plan

## 2023-03-15 ENCOUNTER — NON-APPOINTMENT (OUTPATIENT)
Age: 33
End: 2023-03-15

## 2023-04-14 ENCOUNTER — APPOINTMENT (OUTPATIENT)
Dept: BARIATRICS/WEIGHT MGMT | Facility: CLINIC | Age: 33
End: 2023-04-14
Payer: COMMERCIAL

## 2023-04-14 VITALS
BODY MASS INDEX: 31.36 KG/M2 | DIASTOLIC BLOOD PRESSURE: 67 MMHG | HEART RATE: 63 BPM | OXYGEN SATURATION: 98 % | WEIGHT: 170.44 LBS | HEIGHT: 62 IN | TEMPERATURE: 98.1 F | SYSTOLIC BLOOD PRESSURE: 103 MMHG

## 2023-04-14 PROCEDURE — 99213 OFFICE O/P EST LOW 20 MIN: CPT

## 2023-04-14 PROCEDURE — G0447 BEHAVIOR COUNSEL OBESITY 15M: CPT | Mod: 59

## 2023-04-14 NOTE — ASSESSMENT
[FreeTextEntry1] : Patient with BMI of 31.09 kg/m2. We discussed the importance of weight loss in the management of her comorbidities. Will continue to focus on making lifestyle changes. Encouraged 10 min of power walking daily for cardio.  At least 15 minutes was spent during the visit on obesity counseling. \par \par Continue Contrave titrate to 2 tabs BID. Discussed taking the second dose before leaving work. Will monitor for new s/sx and call if she develops any. Will complete body measurements at home.\par \par Emotional support provided. All of her questions were answered. \par \par f/u in 6 weeks with me and JEANETH

## 2023-04-14 NOTE — HISTORY OF PRESENT ILLNESS
[FreeTextEntry1] : 33 year old female with PCOS presents with her spouse for follow up of weight gain. She reports that she began to struggle with her weight as a teen. She was her heaviest in high school at 260 lbs. In college she was able to go down to 180 lb with strict diet and exercise. She slowly regained the weight and weight back up to 250 lbs. She started the Keto diet and was able to get down to 225 lbs. In 11/2021 she was started on phentermine and went down to 165 lbs in 7/2022 while being seen in the  weight loss clinic. She has gained 5 lbs since being off of the phentermine. \par Of note, was found to have a fistula and had that fixed. Recently had to have the fistula fixed again as well as a sphincterectomy. This has impacted her eating. She feels nervous to eat outside in public in fear that she will need to go to the bathroom urgently. She also endorses some dietary indiscretion from time to time. She has a very busy schedule (works 6 days a week) and does not always have time to meal prep. She stopped exercising in 2020 due to LBP. \par She does suffer from depression/anxiety. She admits to smoking marijuana daily before bed. Does not do it earlier in fear of craving foods and gaining weight. \par \par Since her last visit, she has made a priority to exercise daily. She does about 25 min of pilates every day. She then does 5-10 min of pelvic floor stretching. She has also worked on making changes recommended by the RD. She does report that she had 1 bad day due to significant stress from her family and ate a few donuts. She has started working with a  on mental health therapy. She started the Contrave and is taking 1 tab BID. She does note she has a hard time remembering to take the second dose. She feels well. She notes her mood is better and her spouse agrees. She denies any SI. HR/BP is wnl. Although her weight stayed the same she had actually went up to 174 lbs. She does feel her clothes fit much better. BMs have been more regular.

## 2023-05-14 NOTE — ED PROVIDER NOTE - CONSTITUTIONAL, MLM
normal... Well appearing, awake, alert, oriented to person, place, time/situation and in no apparent distress. Yes - the patient is able to be screened

## 2023-05-15 LAB
25(OH)D3 SERPL-MCNC: 26.2 NG/ML
ALBUMIN SERPL ELPH-MCNC: 4.4 G/DL
ALP BLD-CCNC: 48 U/L
ALT SERPL-CCNC: 18 U/L
ANION GAP SERPL CALC-SCNC: 12 MMOL/L
APPEARANCE: CLEAR
AST SERPL-CCNC: 19 U/L
BACTERIA: NEGATIVE /HPF
BASOPHILS # BLD AUTO: 0.04 K/UL
BASOPHILS NFR BLD AUTO: 0.8 %
BILIRUB SERPL-MCNC: 0.3 MG/DL
BILIRUBIN URINE: NEGATIVE
BLOOD URINE: NEGATIVE
BUN SERPL-MCNC: 12 MG/DL
CALCIUM SERPL-MCNC: 9.1 MG/DL
CAST: 0 /LPF
CHLORIDE SERPL-SCNC: 108 MMOL/L
CHOLEST SERPL-MCNC: 172 MG/DL
CO2 SERPL-SCNC: 25 MMOL/L
COLOR: YELLOW
CREAT SERPL-MCNC: 0.85 MG/DL
EGFR: 93 ML/MIN/1.73M2
EOSINOPHIL # BLD AUTO: 0.16 K/UL
EOSINOPHIL NFR BLD AUTO: 3 %
EPITHELIAL CELLS: 2 /HPF
ESTIMATED AVERAGE GLUCOSE: 103 MG/DL
GLUCOSE QUALITATIVE U: NEGATIVE MG/DL
GLUCOSE SERPL-MCNC: 89 MG/DL
HBA1C MFR BLD HPLC: 5.2 %
HCT VFR BLD CALC: 43 %
HDLC SERPL-MCNC: 85 MG/DL
HGB BLD-MCNC: 14.1 G/DL
IMM GRANULOCYTES NFR BLD AUTO: 0.2 %
KETONES URINE: NEGATIVE MG/DL
LDLC SERPL CALC-MCNC: 76 MG/DL
LEUKOCYTE ESTERASE URINE: NEGATIVE
LYMPHOCYTES # BLD AUTO: 2.24 K/UL
LYMPHOCYTES NFR BLD AUTO: 42.7 %
MAN DIFF?: NORMAL
MCHC RBC-ENTMCNC: 31.1 PG
MCHC RBC-ENTMCNC: 32.8 GM/DL
MCV RBC AUTO: 94.7 FL
MICROSCOPIC-UA: NORMAL
MONOCYTES # BLD AUTO: 0.43 K/UL
MONOCYTES NFR BLD AUTO: 8.2 %
NEUTROPHILS # BLD AUTO: 2.37 K/UL
NEUTROPHILS NFR BLD AUTO: 45.1 %
NITRITE URINE: NEGATIVE
NONHDLC SERPL-MCNC: 86 MG/DL
PH URINE: 5.5
PLATELET # BLD AUTO: 192 K/UL
POTASSIUM SERPL-SCNC: 4.5 MMOL/L
PROT SERPL-MCNC: 6.7 G/DL
PROTEIN URINE: NEGATIVE MG/DL
RBC # BLD: 4.54 M/UL
RBC # FLD: 12.4 %
RED BLOOD CELLS URINE: 1 /HPF
SODIUM SERPL-SCNC: 144 MMOL/L
SPECIFIC GRAVITY URINE: 1.02
TRIGL SERPL-MCNC: 53 MG/DL
TSH SERPL-ACNC: 1.5 UIU/ML
UROBILINOGEN URINE: 0.2 MG/DL
WBC # FLD AUTO: 5.25 K/UL
WHITE BLOOD CELLS URINE: 0 /HPF

## 2023-05-19 ENCOUNTER — NON-APPOINTMENT (OUTPATIENT)
Age: 33
End: 2023-05-19

## 2023-05-19 ENCOUNTER — APPOINTMENT (OUTPATIENT)
Dept: INTERNAL MEDICINE | Facility: CLINIC | Age: 33
End: 2023-05-19
Payer: COMMERCIAL

## 2023-05-19 VITALS
BODY MASS INDEX: 30.91 KG/M2 | RESPIRATION RATE: 14 BRPM | DIASTOLIC BLOOD PRESSURE: 74 MMHG | OXYGEN SATURATION: 99 % | HEART RATE: 85 BPM | SYSTOLIC BLOOD PRESSURE: 122 MMHG | HEIGHT: 62 IN | TEMPERATURE: 99.1 F | WEIGHT: 168 LBS

## 2023-05-19 PROCEDURE — 99395 PREV VISIT EST AGE 18-39: CPT

## 2023-05-19 NOTE — HISTORY OF PRESENT ILLNESS
[FreeTextEntry1] : New pt  [de-identified] : PT Aide  @ [employer]\par >lives w/ BF renting at his parents house\par \par Hx Anal fissues colorectal surgery x 2  found a fistula rectal to outside / anus Dr Key Rothman ([employer]) s/p fissure repair last summer and repeat OR 2/2 fistula recurrence and had fistulectomy / sphinchterectomy Jan 2023\par goes to pelvic floor PT in [facility]\par no further pain but "still healing"\par \par Back surgery L4-5 [place] 2020 Dr Smauel \par \par hx Obesity was 260 lbs now down 160s was on phentermine now on Contrave (NP in [place])\par hasn't lost any more weight but feels great\par has no pain\par mmod\par \par Hx PCOS saw Endo Dr Metzger was all good

## 2023-05-19 NOTE — HEALTH RISK ASSESSMENT
[Excellent] : ~his/her~  mood as  excellent [No] : No [No falls in past year] : Patient reported no falls in the past year [0] : 2) Feeling down, depressed, or hopeless: Not at all (0) [PHQ-2 Negative - No further assessment needed] : PHQ-2 Negative - No further assessment needed [Patient reported PAP Smear was normal] : Patient reported PAP Smear was normal [HIV test declined] : HIV test declined [Hepatitis C test declined] : Hepatitis C test declined [None] : None [With Significant Other] : lives with significant other [Employed] : employed [Less Than High School] : less than high school [] :  [Sexually Active] : sexually active [Feels Safe at Home] : Feels safe at home [Fully functional (bathing, dressing, toileting, transferring, walking, feeding)] : Fully functional (bathing, dressing, toileting, transferring, walking, feeding) [Fully functional (using the telephone, shopping, preparing meals, housekeeping, doing laundry, using] : Fully functional and needs no help or supervision to perform IADLs (using the telephone, shopping, preparing meals, housekeeping, doing laundry, using transportation, managing medications and managing finances) [de-identified] : exercises pilates 2-3x per week; walks  [de-identified] : good lots of fiber portion control lots of veggies salad protein [[id number]] : 0 [Change in mental status noted] : No change in mental status noted [Language] : denies difficulty with language [Behavior] : denies difficulty with behavior [Learning/Retaining New Information] : denies difficulty learning/retaining new information [Handling Complex Tasks] : denies difficulty handling complex tasks [Reasoning] : denies difficulty with reasoning [Spatial Ability and Orientation] : denies difficulty with spatial ability and orientation [Reports changes in hearing] : Reports no changes in hearing [Reports changes in vision] : Reports no changes in vision [Reports changes in dental health] : Reports no changes in dental health [PapSmearDate] : 11/2022 [FreeTextEntry2] : PT [facility], public Health

## 2023-06-09 ENCOUNTER — APPOINTMENT (OUTPATIENT)
Dept: BARIATRICS/WEIGHT MGMT | Facility: CLINIC | Age: 33
End: 2023-06-09
Payer: COMMERCIAL

## 2023-06-09 VITALS
OXYGEN SATURATION: 97 % | BODY MASS INDEX: 30.91 KG/M2 | TEMPERATURE: 98.1 F | HEIGHT: 62 IN | SYSTOLIC BLOOD PRESSURE: 109 MMHG | DIASTOLIC BLOOD PRESSURE: 72 MMHG | WEIGHT: 168 LBS | HEART RATE: 77 BPM

## 2023-06-09 DIAGNOSIS — R63.5 ABNORMAL WEIGHT GAIN: ICD-10-CM

## 2023-06-09 PROCEDURE — G0447 BEHAVIOR COUNSEL OBESITY 15M: CPT | Mod: 59

## 2023-06-09 PROCEDURE — 99214 OFFICE O/P EST MOD 30 MIN: CPT

## 2023-06-09 NOTE — HISTORY OF PRESENT ILLNESS
[FreeTextEntry1] : 33 year old female with PCOS presents with her spouse for follow up of weight gain. She reports that she began to struggle with her weight as a teen. She was her heaviest in high school at 260 lbs. In college she was able to go down to 180 lb with strict diet and exercise. She slowly regained the weight and weight back up to 250 lbs. She started the Keto diet and was able to get down to 225 lbs. In 11/2021 she was started on phentermine and went down to 165 lbs in 7/2022 while being seen in the  weight loss clinic. She has gained 5 lbs since being off of the phentermine. \par Of note, was found to have a fistula and had that fixed. Recently had to have the fistula fixed again as well as a sphincterectomy. This has impacted her eating. She feels nervous to eat outside in public in fear that she will need to go to the bathroom urgently. She also endorses some dietary indiscretion from time to time. She has a very busy schedule (works 6 days a week) and does not always have time to meal prep. She stopped exercising in 2020 due to LBP. \par She does suffer from depression/anxiety. She admits to smoking marijuana daily before bed. Does not do it earlier in fear of craving foods and gaining weight. \par \par Since her last visit, she has graduate and has had more time. She does feel very good. She admits that she has been a little more indulgent at times. She continues to exercise most days. She does about 25 min of pilates and then does 5-10 min of pelvic floor stretching. She has also worked on making changes recommended by the RD. She continues working with a  on mental health therapy. She started the Contrave and is taking 2 tabs qam and 1 tab qpm. She notes her mood is better and her spouse agrees. She denies any SI. HR/BP is wnl. She is down 2 lbs since last visit. She does feel her clothes fit much better. BMs have been more regular. \par Recent labs were reviewed and discussed.

## 2023-06-09 NOTE — ASSESSMENT
[FreeTextEntry1] : Patient with BMI of 31.09 kg/m2. Continue to focus on lifestyle modifications. Discussed refocusing on diet and adding in strength training. At least 15 minutes was spent during the visit on obesity counseling. \par \par Continue Contrave titrate to 2 tabs BID. Will monitor for new s/sx and call if she develops any. Will complete body measurements at home.\par \par Spent time discussing patient's concerns with additional weight loss. Recommended seeing Dr. Ivory for evaluation for body contouring.\par \par Emotional support provided. All of her questions were answered. \par \par f/u with RD in 2 weeks\par f/u with me in 3 months

## 2023-06-16 ENCOUNTER — APPOINTMENT (OUTPATIENT)
Dept: OBGYN | Facility: CLINIC | Age: 33
End: 2023-06-16
Payer: COMMERCIAL

## 2023-06-16 VITALS
OXYGEN SATURATION: 98 % | HEART RATE: 74 BPM | BODY MASS INDEX: 30.91 KG/M2 | SYSTOLIC BLOOD PRESSURE: 109 MMHG | WEIGHT: 168 LBS | HEIGHT: 62 IN | TEMPERATURE: 98.6 F | RESPIRATION RATE: 14 BRPM | DIASTOLIC BLOOD PRESSURE: 69 MMHG

## 2023-06-16 DIAGNOSIS — N76.0 ACUTE VAGINITIS: ICD-10-CM

## 2023-06-16 DIAGNOSIS — Z01.419 ENCOUNTER FOR GYNECOLOGICAL EXAMINATION (GENERAL) (ROUTINE) W/OUT ABNORMAL FINDINGS: ICD-10-CM

## 2023-06-16 LAB
HCG UR QL: NEGATIVE
QUALITY CONTROL: YES

## 2023-06-16 PROCEDURE — 99385 PREV VISIT NEW AGE 18-39: CPT

## 2023-06-16 PROCEDURE — 81025 URINE PREGNANCY TEST: CPT

## 2023-06-16 RX ORDER — DICLOFENAC SODIUM 75 MG/1
75 TABLET, DELAYED RELEASE ORAL
Qty: 60 | Refills: 1 | Status: COMPLETED | COMMUNITY
Start: 2021-05-13 | End: 2023-06-16

## 2023-06-16 RX ORDER — CYCLOBENZAPRINE HYDROCHLORIDE 7.5 MG/1
7.5 TABLET, FILM COATED ORAL 3 TIMES DAILY
Qty: 90 | Refills: 0 | Status: COMPLETED | COMMUNITY
Start: 2022-04-01 | End: 2023-06-16

## 2023-06-16 NOTE — HISTORY OF PRESENT ILLNESS
[FreeTextEntry1] : 33  presents today for well woman exam.\par \par LMP: 6/2\par \par POB: denies\par PGYN: PCOS, hx of HPV\par PMH: depression \par PSH: back surgery, colorectal fistula repair\par Med: per MAR\par All: [facility]\par SH: occ marijuana\par FH: mother and father with substance abuse issues--mother passed at 54\par \par

## 2023-06-16 NOTE — PLAN
[FreeTextEntry1] : \par \par I spent the time noted on the day of this patient encounter preparing for, providing and documenting the above service. I have  counseled and educated the patient on the differential, workup, disease course, and treatment/management plan. Education was provided to the patient during this encounter. All questions and concerns were answered and addressed in detail.\par \par Yeni Baig MD\par

## 2023-06-19 ENCOUNTER — NON-APPOINTMENT (OUTPATIENT)
Age: 33
End: 2023-06-19

## 2023-06-20 LAB
C TRACH RRNA SPEC QL NAA+PROBE: NOT DETECTED
CANDIDA VAG CYTO: NOT DETECTED
G VAGINALIS+PREV SP MTYP VAG QL MICRO: NOT DETECTED
HPV HIGH+LOW RISK DNA PNL CVX: NOT DETECTED
N GONORRHOEA RRNA SPEC QL NAA+PROBE: NOT DETECTED
SOURCE AMPLIFICATION: NORMAL
T VAGINALIS VAG QL WET PREP: NOT DETECTED

## 2023-06-28 ENCOUNTER — NON-APPOINTMENT (OUTPATIENT)
Age: 33
End: 2023-06-28

## 2023-06-29 ENCOUNTER — APPOINTMENT (OUTPATIENT)
Dept: PLASTIC SURGERY | Facility: CLINIC | Age: 33
End: 2023-06-29
Payer: COMMERCIAL

## 2023-06-29 VITALS
TEMPERATURE: 96.3 F | SYSTOLIC BLOOD PRESSURE: 125 MMHG | RESPIRATION RATE: 14 BRPM | HEIGHT: 62 IN | WEIGHT: 169.7 LBS | BODY MASS INDEX: 31.23 KG/M2 | OXYGEN SATURATION: 98 % | DIASTOLIC BLOOD PRESSURE: 83 MMHG | HEART RATE: 93 BPM

## 2023-06-29 PROCEDURE — XXXXX: CPT | Mod: 1L

## 2023-06-30 ENCOUNTER — APPOINTMENT (OUTPATIENT)
Age: 33
End: 2023-06-30

## 2023-06-30 NOTE — CONSULT LETTER
[Dear  ___] : Dear ~MARY, [Consult Letter:] : I had the pleasure of evaluating your patient, [unfilled]. [Please see my note below.] : Please see my note below. [Consult Closing:] : Thank you very much for allowing me to participate in the care of this patient.  If you have any questions, please do not hesitate to contact me. [Sincerely,] : Sincerely, [FreeTextEntry3] : Russell Ivory MD, FACS

## 2023-07-21 LAB — CYTOLOGY CVX/VAG DOC THIN PREP: NORMAL

## 2023-07-28 ENCOUNTER — APPOINTMENT (OUTPATIENT)
Dept: NEUROSURGERY | Facility: CLINIC | Age: 33
End: 2023-07-28
Payer: COMMERCIAL

## 2023-07-28 VITALS
DIASTOLIC BLOOD PRESSURE: 77 MMHG | HEART RATE: 59 BPM | WEIGHT: 169 LBS | OXYGEN SATURATION: 98 % | SYSTOLIC BLOOD PRESSURE: 116 MMHG | HEIGHT: 62 IN | BODY MASS INDEX: 31.1 KG/M2

## 2023-07-28 DIAGNOSIS — Z98.890 OTHER SPECIFIED POSTPROCEDURAL STATES: ICD-10-CM

## 2023-07-28 PROCEDURE — 99213 OFFICE O/P EST LOW 20 MIN: CPT

## 2023-07-28 RX ORDER — MULTIVITAMIN
TABLET ORAL DAILY
Refills: 0 | Status: DISCONTINUED | COMMUNITY
Start: 2021-05-13 | End: 2023-07-28

## 2023-07-28 NOTE — PRE-ANESTHESIA EVALUATION ADULT - NSANTHASARD_GEN_ALL_CORE
You may use Tylenol and ibuprofen alternate as needed for pain.  Take as directed on bottle.  Do not exceed more than the recommended dose 24 hours.  Please follow up with your pediatrician within 48 hours.  Do not participate in school sports until chest pain and shortness of breath is resolved.  
2

## 2023-07-31 PROBLEM — Z98.890 S/P LUMBAR LAMINECTOMY: Status: ACTIVE | Noted: 2020-02-07

## 2023-08-02 ENCOUNTER — APPOINTMENT (OUTPATIENT)
Dept: INTERNAL MEDICINE | Facility: CLINIC | Age: 33
End: 2023-08-02
Payer: COMMERCIAL

## 2023-08-02 VITALS
SYSTOLIC BLOOD PRESSURE: 110 MMHG | RESPIRATION RATE: 14 BRPM | DIASTOLIC BLOOD PRESSURE: 72 MMHG | HEART RATE: 71 BPM | TEMPERATURE: 97.9 F | HEIGHT: 62 IN | BODY MASS INDEX: 31.1 KG/M2 | WEIGHT: 169 LBS | OXYGEN SATURATION: 96 %

## 2023-08-02 DIAGNOSIS — M54.16 RADICULOPATHY, LUMBAR REGION: ICD-10-CM

## 2023-08-02 DIAGNOSIS — B36.9 SUPERFICIAL MYCOSIS, UNSPECIFIED: ICD-10-CM

## 2023-08-02 PROCEDURE — 99214 OFFICE O/P EST MOD 30 MIN: CPT

## 2023-08-02 NOTE — PHYSICAL EXAM
[No Acute Distress] : no acute distress [Well Nourished] : well nourished [Well Developed] : well developed [Well-Appearing] : well-appearing [Normal Sclera/Conjunctiva] : normal sclera/conjunctiva [PERRL] : pupils equal round and reactive to light [EOMI] : extraocular movements intact [Normal Outer Ear/Nose] : the outer ears and nose were normal in appearance [Normal Oropharynx] : the oropharynx was normal [No JVD] : no jugular venous distention [No Lymphadenopathy] : no lymphadenopathy [Supple] : supple [Thyroid Normal, No Nodules] : the thyroid was normal and there were no nodules present [No Respiratory Distress] : no respiratory distress  [No Accessory Muscle Use] : no accessory muscle use [Clear to Auscultation] : lungs were clear to auscultation bilaterally [Normal Rate] : normal rate  [Regular Rhythm] : with a regular rhythm [Normal S1, S2] : normal S1 and S2 [No Murmur] : no murmur heard [No Carotid Bruits] : no carotid bruits [No Abdominal Bruit] : a ~M bruit was not heard ~T in the abdomen [No Varicosities] : no varicosities [Pedal Pulses Present] : the pedal pulses are present [No Edema] : there was no peripheral edema [No Palpable Aorta] : no palpable aorta [No Extremity Clubbing/Cyanosis] : no extremity clubbing/cyanosis [Soft] : abdomen soft [Non Tender] : non-tender [Non-distended] : non-distended [No Masses] : no abdominal mass palpated [No HSM] : no HSM [Normal Bowel Sounds] : normal bowel sounds [Normal Posterior Cervical Nodes] : no posterior cervical lymphadenopathy [Normal Anterior Cervical Nodes] : no anterior cervical lymphadenopathy [No CVA Tenderness] : no CVA  tenderness [No Spinal Tenderness] : no spinal tenderness [No Joint Swelling] : no joint swelling [Grossly Normal Strength/Tone] : grossly normal strength/tone [Coordination Grossly Intact] : coordination grossly intact [No Focal Deficits] : no focal deficits [Normal Gait] : normal gait [Deep Tendon Reflexes (DTR)] : deep tendon reflexes were 2+ and symmetric [Normal Affect] : the affect was normal [Normal Insight/Judgement] : insight and judgment were intact [de-identified] : excessive abdominal skin folds w/ inter-fold erythema, moisture, ?fungal rash

## 2023-08-02 NOTE — ASSESSMENT
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 PRISTINE HYGEINE as skin on skin is a breeding ground for fungus, especially in the excessive [place] -pt also has been very good about exercising vigorously to keep keep her weight down, although can be discouraged at times as exercise and sweating makes her skin fold fungal dermatitis flare up -psychologically pt has been more depressed due to her excessive skin folds and inability to lose further weight as she has done everything to continue trying to lose weight and per her current BMI is still considered obese  Pt should seek plastic surgical opinion for MEDICAL REMOVAL OF EXCESS SKIN FOLDS which in my opinion is not cosmetic

## 2023-08-02 NOTE — HISTORY OF PRESENT ILLNESS
[FreeTextEntry1] : excessive skin problem and back pain [de-identified] : pt weight has been stable but unable to lose any more weight - 170 for > 1 year skin folds are bothersome and contribute to back pain redness between skin folds, difficult to keep clean affecting back pain affecting her mental health

## 2023-08-05 NOTE — ASSESSMENT
[FreeTextEntry1] : IMPRESSION: 33 year old female with no significant PMHx who had undergone RT L4- 5 microdiscectomy for right lower extremity radiculopathy and L4- 5 disc herniation. Pt recovered well and was discharged with on Flexeril 5 mg PRN on 12/4/2020.  She is feeling better at present, she had significant weight loss post op and denies pain unless sitting for long periods of time. She just has trouble doing some movements/exercises due to extra skin/weight.   PLAN: No scheduled follow up at this time I recommend and support skin removal surgery as this extra skin/weight is impeding ability to do exercises and can cause further back problems.

## 2023-08-05 NOTE — PHYSICAL EXAM
[General Appearance - Alert] : alert [General Appearance - In No Acute Distress] : in no acute distress [Oriented To Time, Place, And Person] : oriented to person, place, and time [Affect] : the affect was normal [Mood] : the mood was normal [Person] : oriented to person [Place] : oriented to place [Time] : oriented to time [Cranial Nerves Oculomotor (III)] : extraocular motion intact [Cranial Nerves Trigeminal (V)] : facial sensation intact symmetrically [Cranial Nerves Facial (VII)] : face symmetrical [Cranial Nerves Vestibulocochlear (VIII)] : hearing was intact bilaterally [Cranial Nerves Accessory (XI - Cranial And Spinal)] : head turning and shoulder shrug symmetric [Cranial Nerves Hypoglossal (XII)] : there was no tongue deviation with protrusion [Motor Strength] : muscle strength was normal in all four extremities [Sensation Tactile Decrease] : light touch was intact [Abnormal Walk] : normal gait [Balance] : balance was intact [] : no respiratory distress [Respiration, Rhythm And Depth] : normal respiratory rhythm and effort [Well-Healed] : well-healed [No Tenderness to Palpation] : no spine tenderness on palpation [FreeTextEntry1] : Large amount of redundant abdominal skin that is hanging inferiorly.

## 2023-08-05 NOTE — HISTORY OF PRESENT ILLNESS
[FreeTextEntry1] : Ms. Shi is a 33 year old female with no significant PMHx who had undergone RT L4-5 microdiscectomy for right lower extremity radiculopathy in 2020. She presented with acute onset of severe R lower lumbar back pain with radiation down R thigh to outside of R calf associated with severe muscle spasms and paresthesias down the R leg while trying to uncork a bottle in an awkward position. She was found with L4-5 disc herniation and admitted on 1/22/20. On 1/25/20 she underwent RT L4-5 microdiscectomy. Hospital course uncomplicated. Discharged home on 01/26/2020 and had her 1 st follow up appointment done in 2/7/2020. RLE radiculopathy significantly improved post-operatively. Pt had a visit on 9/8/20 with pain and spasm which is currently being managed with muscle relaxant and PT. Advised Xray lumbar with follow up. Pt was discharged with on Flexeril 5 mg PRN ON 12/4/2020.    Today pt presents for follow up visit. She feels she has been doing very well and hasn't had back pain. She only is uncomfortable if she is in one position for too long or moves a certain way. She has lost 70 lbs since surgery. She has been doing a lot of pilates, walking, stretching. Denies weakness, numbness or tingling in hands or feet, has very slight decrease in sensation in right leg. Denies bladder or bowel incontinence, or saddle anesthesia. She is not taking any pain medication. After significant weight loss, she has extra skin that is impeding her ability to exercise/move in certain ways and she is considering skin removal.

## 2023-08-09 DIAGNOSIS — Z30.09 ENCOUNTER FOR OTHER GENERAL COUNSELING AND ADVICE ON CONTRACEPTION: ICD-10-CM

## 2023-08-26 ENCOUNTER — NON-APPOINTMENT (OUTPATIENT)
Age: 33
End: 2023-08-26

## 2023-09-13 ENCOUNTER — APPOINTMENT (OUTPATIENT)
Dept: BARIATRICS/WEIGHT MGMT | Facility: CLINIC | Age: 33
End: 2023-09-13

## 2023-10-03 NOTE — ED ADULT NURSE NOTE - MODE OF DISCHARGE
TRANSITIONAL CARE MANAGEMENT - HOSPITAL DISCHARGE FOLLOW-UP    Contacted Mr. Butts regarding follow-up for diarrhea after hospital discharge. He was discharged from the hospital on 10/1. Review of the After Visit Summary from the recent hospitalization indicates that the patient needs to follow up with GI.    He feels that he is doing well at home.   His diet concern is none. Overall, the patient is eating well.   Ambulation: staying the same  Fever: is not present  Pain: none  Activities of Daily Living (global): Self-care   Patient states that he does have sufficient family support. He feels that he is able to ask for assistance when needed.     Additional patient/family concerns: None .    Discharge medications were verified with the patient. He is fully compliant with the medication regimen prescribed at the time of discharge. He reports that he is not experiencing any medication side effects.    Upon discharge, the patient was to receive no additional services. T    Advance care planning documents on file - no    Patient has an appointment on 10/12 with Jerson Herrera MD. Mr. Butts was reminded about the importance of keeping this appointment.    Ambulatory

## 2023-10-06 ENCOUNTER — APPOINTMENT (OUTPATIENT)
Dept: PLASTIC SURGERY | Facility: CLINIC | Age: 33
End: 2023-10-06
Payer: COMMERCIAL

## 2023-10-06 VITALS
RESPIRATION RATE: 16 BRPM | HEART RATE: 82 BPM | HEIGHT: 62 IN | WEIGHT: 167 LBS | OXYGEN SATURATION: 99 % | BODY MASS INDEX: 30.73 KG/M2 | DIASTOLIC BLOOD PRESSURE: 72 MMHG | SYSTOLIC BLOOD PRESSURE: 118 MMHG

## 2023-10-06 DIAGNOSIS — L40.9 PSORIASIS, UNSPECIFIED: ICD-10-CM

## 2023-10-06 DIAGNOSIS — Z82.49 FAMILY HISTORY OF ISCHEMIC HEART DISEASE AND OTHER DISEASES OF THE CIRCULATORY SYSTEM: ICD-10-CM

## 2023-10-06 DIAGNOSIS — Z83.3 FAMILY HISTORY OF DIABETES MELLITUS: ICD-10-CM

## 2023-10-06 DIAGNOSIS — Z81.8 FAMILY HISTORY OF OTHER MENTAL AND BEHAVIORAL DISORDERS: ICD-10-CM

## 2023-10-06 DIAGNOSIS — Z81.3 FAMILY HISTORY OF OTHER PSYCHOACTIVE SUBSTANCE ABUSE AND DEPENDENCE: ICD-10-CM

## 2023-10-06 DIAGNOSIS — Z84.1 FAMILY HISTORY OF DISORDERS OF KIDNEY AND URETER: ICD-10-CM

## 2023-10-06 PROCEDURE — 99203 OFFICE O/P NEW LOW 30 MIN: CPT

## 2023-10-18 ENCOUNTER — APPOINTMENT (OUTPATIENT)
Dept: BARIATRICS | Facility: CLINIC | Age: 33
End: 2023-10-18
Payer: COMMERCIAL

## 2023-10-18 VITALS
OXYGEN SATURATION: 97 % | DIASTOLIC BLOOD PRESSURE: 66 MMHG | BODY MASS INDEX: 30.82 KG/M2 | HEART RATE: 75 BPM | HEIGHT: 62 IN | SYSTOLIC BLOOD PRESSURE: 105 MMHG | WEIGHT: 167.5 LBS | TEMPERATURE: 98 F

## 2023-10-18 PROCEDURE — G0447 BEHAVIOR COUNSEL OBESITY 15M: CPT | Mod: 59

## 2023-10-18 PROCEDURE — 99214 OFFICE O/P EST MOD 30 MIN: CPT

## 2023-10-25 ENCOUNTER — NON-APPOINTMENT (OUTPATIENT)
Age: 33
End: 2023-10-25

## 2023-11-30 ENCOUNTER — APPOINTMENT (OUTPATIENT)
Dept: PSYCHIATRY | Facility: CLINIC | Age: 33
End: 2023-11-30
Payer: COMMERCIAL

## 2023-11-30 DIAGNOSIS — Z81.3 FAMILY HISTORY OF OTHER PSYCHOACTIVE SUBSTANCE ABUSE AND DEPENDENCE: ICD-10-CM

## 2023-11-30 PROCEDURE — 90791 PSYCH DIAGNOSTIC EVALUATION: CPT | Mod: GT

## 2023-12-01 PROBLEM — Z81.3 FAMILY HISTORY OF DRUG ABUSE: Status: ACTIVE | Noted: 2020-02-10

## 2023-12-04 ENCOUNTER — APPOINTMENT (OUTPATIENT)
Dept: PSYCHIATRY | Facility: CLINIC | Age: 33
End: 2023-12-04
Payer: COMMERCIAL

## 2023-12-04 PROCEDURE — 90837 PSYTX W PT 60 MINUTES: CPT | Mod: GT

## 2023-12-13 ENCOUNTER — APPOINTMENT (OUTPATIENT)
Dept: PSYCHIATRY | Facility: CLINIC | Age: 33
End: 2023-12-13
Payer: COMMERCIAL

## 2023-12-13 PROCEDURE — 90837 PSYTX W PT 60 MINUTES: CPT | Mod: GT

## 2023-12-15 NOTE — REASON FOR VISIT
[Patient preference] : as per patient preference [Continuing, patient not seen in-person within last 12 months (provide details below)] : Telehealth services are continuing, patient not seen in-person within last 12 months.  [Telehealth (audio & video) - Individual/Group] : This visit was provided via telehealth using real-time 2-way audio visual technology. [Other Location: e.g. Home (Enter Location, City,State)___] : The provider was located at [unfilled]. [Home] : The patient, [unfilled], was located at home, [unfilled], at the time of the visit. [Verbal consent obtained from patient/other participant(s)] : Verbal consent for telehealth/telephonic services obtained from patient/other participant(s) [FreeTextEntry4] : 9:02am [FreeTextEntry5] : 9:57am [FreeTextEntry3] : pt preference [Patient] : Patient [FreeTextEntry1] : grief after father's death in October 2023, as well as complex psychosocial history

## 2023-12-15 NOTE — PLAN
[FreeTextEntry2] : Adjust to life after father's death and reduce symptoms of anxiety and depression 1) Reduce scores on symptom scales by at least 20% 2) Learn at least 2-3 coping skills [Cognitive and/or Behavior Therapy] : Cognitive and/or Behavior Therapy  [Supportive Therapy] : Supportive Therapy [de-identified] :  PROGRESS TO DATE/UPDATES: - Pt identified things that "make her feel safe" (pt's interpretation of "coping skills):  money, reassurance from others, asking permission, being in nature, taking shower to be calm, marijuana (has a medical card), partner putting hand on knee while driving (to demonstrate that he is not angry with her for feeling nervous), being around other people when they are happy and comfortable, clean house, being able to fix things/solve problems for others/crossing things off to-do list, being home, having a back-up/escape plan, self-talk when can identify the trigger of a strong emotion, tell self that "I'm unbreakable" - reports being highly sensitive to others' emotions and automatically interpreting negative emotions as directed towards her, unless reminds herself that it may not be true  INTERVENTIONS/PLAN: Socratic dialogue to support pt self-reflection. Pt identified strengths in caring for concrete needs and being too hard on herself for her emotional needs. Discussed Maslow's hierarchy of needs. Pt identified that she always "follows her moral compass" and feels satisfied. Gently elicited cognitive dissonance. Pt expressed ambivalence about marijuana use and notes smoking or having an edible nearly daily immediately after work.  For home practice, pt set goal to modify home routines - shower after work instead of smoking and using "oh well" statement when seeing up to 2 objects out of place at home.  [Return in ____ week(s)] : Return in [unfilled] week(s) [FreeTextEntry1] : Continue supportive grief counseling with CBT/DBT skills as appropriate. Continue monitoring risk level Monitor potential need for trauma-focused psychotherapy

## 2023-12-15 NOTE — PHYSICAL EXAM
[Average] : average [Cooperative] : cooperative [Anxious] : anxious [Labile] : labile [Clear] : clear [Linear/Goal Directed] : linear/goal directed [WNL] : within normal limits

## 2023-12-22 ENCOUNTER — APPOINTMENT (OUTPATIENT)
Dept: PSYCHIATRY | Facility: CLINIC | Age: 33
End: 2023-12-22
Payer: COMMERCIAL

## 2023-12-22 PROCEDURE — 90837 PSYTX W PT 60 MINUTES: CPT | Mod: 95

## 2023-12-22 NOTE — REASON FOR VISIT
[Patient preference] : as per patient preference [Access issues (e.g., transportation, impaired mobility, etc.)] : due to patient's access issues [Continuing, patient not seen in-person within last 12 months (provide details below)] : Telehealth services are continuing, patient not seen in-person within last 12 months.  [Telehealth (audio & video) - Individual/Group] : This visit was provided via telehealth using real-time 2-way audio visual technology. [Other Location: e.g. Home (Enter Location, City,State)___] : The patient, [unfilled], was located at [unfilled] at the time of the visit. [Verbal consent obtained from patient/other participant(s)] : Verbal consent for telehealth/telephonic services obtained from patient/other participant(s) [Patient] : Patient [FreeTextEntry4] : 4:57pm [FreeTextEntry5] : 6:04pm [FreeTextEntry3] : pt preference [FreeTextEntry1] : grief after father's death in October 2023, as well as complex psychosocial history

## 2023-12-22 NOTE — PHYSICAL EXAM
[Average] : average [Cooperative] : cooperative [Depressed] : depressed [Angry] : angry [Clear] : clear [Linear/Goal Directed] : linear/goal directed [WNL] : within normal limits [Labile] : labile

## 2023-12-22 NOTE — END OF VISIT
[Individual Psychotherapy for 53+ minutes] : Individual Psychotherapy for 53+ minutes  [Licensed Clinician] : Licensed Clinician [Duration of Psychotherapy Visit (minutes spent in synchronous communication): ____] : Duration of Psychotherapy Visit (minutes spent in synchronous communication): [unfilled]

## 2023-12-22 NOTE — PLAN
[Cognitive and/or Behavior Therapy] : Cognitive and/or Behavior Therapy  [Supportive Therapy] : Supportive Therapy [Return in ____ week(s)] : Return in [unfilled] week(s) [FreeTextEntry2] : Adjust to life after father's death and reduce symptoms of anxiety and depression 1) Reduce scores on symptom scales by at least 20% 2) Learn at least 2-3 coping skills [de-identified] :  PROGRESS TO DATE/UPDATES: - Tearful about holidays, missing parents and the hope that things were going to get better for her parents. Stressed because feels over-busy and unable to grieve. - Ongoing stress related to court proceedings and father's estate.  INTERVENTIONS/PLAN: Reflective listening. Normalized dialectical emotions. Collaboratively brainstormed ways to create space for feeling sad and grief. Discussed degrees of responsibility and feelings of guilt.  Socratic dialogue to help pt begin to confront maladaptive thoughts. Discussed rewarding self after hard things in holiday. Supported pt in regulating her emotions prior to end of session. She plans to call her partner to let him know she is on her way home. No safety concerns at this time.  [FreeTextEntry1] : Continue supportive grief counseling with CBT/DBT skills as appropriate. Continue monitoring risk level Monitor potential need for trauma-focused psychotherapy

## 2023-12-25 ENCOUNTER — TRANSCRIPTION ENCOUNTER (OUTPATIENT)
Age: 33
End: 2023-12-25

## 2023-12-25 LAB
25(OH)D3 SERPL-MCNC: 34 NG/ML
ALBUMIN SERPL ELPH-MCNC: 4.1 G/DL
ALP BLD-CCNC: 37 U/L
ALT SERPL-CCNC: 14 U/L
ANION GAP SERPL CALC-SCNC: 10 MMOL/L
AST SERPL-CCNC: 16 U/L
BASOPHILS # BLD AUTO: 0.03 K/UL
BASOPHILS NFR BLD AUTO: 0.6 %
BILIRUB SERPL-MCNC: 0.5 MG/DL
BUN SERPL-MCNC: 9 MG/DL
CALCIUM SERPL-MCNC: 8.6 MG/DL
CHLORIDE SERPL-SCNC: 108 MMOL/L
CHOLEST SERPL-MCNC: 158 MG/DL
CO2 SERPL-SCNC: 24 MMOL/L
CREAT SERPL-MCNC: 0.92 MG/DL
EGFR: 84 ML/MIN/1.73M2
EOSINOPHIL # BLD AUTO: 0.16 K/UL
EOSINOPHIL NFR BLD AUTO: 3.3 %
ESTIMATED AVERAGE GLUCOSE: 94 MG/DL
FOLATE SERPL-MCNC: 11.3 NG/ML
GLUCOSE SERPL-MCNC: 83 MG/DL
HBA1C MFR BLD HPLC: 4.9 %
HCT VFR BLD CALC: 38.7 %
HDLC SERPL-MCNC: 79 MG/DL
HGB BLD-MCNC: 13.4 G/DL
IMM GRANULOCYTES NFR BLD AUTO: 0.2 %
INSULIN P FAST SERPL-ACNC: 5.6 UU/ML
LDLC SERPL CALC-MCNC: 69 MG/DL
LYMPHOCYTES # BLD AUTO: 2.18 K/UL
LYMPHOCYTES NFR BLD AUTO: 45.6 %
MAN DIFF?: NORMAL
MCHC RBC-ENTMCNC: 32.3 PG
MCHC RBC-ENTMCNC: 34.6 GM/DL
MCV RBC AUTO: 93.3 FL
MONOCYTES # BLD AUTO: 0.39 K/UL
MONOCYTES NFR BLD AUTO: 8.2 %
NEUTROPHILS # BLD AUTO: 2.01 K/UL
NEUTROPHILS NFR BLD AUTO: 42.1 %
NONHDLC SERPL-MCNC: 79 MG/DL
PLATELET # BLD AUTO: 203 K/UL
POTASSIUM SERPL-SCNC: 4.3 MMOL/L
PROT SERPL-MCNC: 6.1 G/DL
RBC # BLD: 4.15 M/UL
RBC # FLD: 12.7 %
SODIUM SERPL-SCNC: 142 MMOL/L
T4 FREE SERPL-MCNC: 1.2 NG/DL
T4 SERPL-MCNC: 6.9 UG/DL
TRIGL SERPL-MCNC: 50 MG/DL
TSH SERPL-ACNC: 1.03 UIU/ML
VIT B12 SERPL-MCNC: 268 PG/ML
WBC # FLD AUTO: 4.78 K/UL

## 2024-01-03 ENCOUNTER — APPOINTMENT (OUTPATIENT)
Dept: PSYCHIATRY | Facility: CLINIC | Age: 34
End: 2024-01-03
Payer: COMMERCIAL

## 2024-01-03 PROCEDURE — 90837 PSYTX W PT 60 MINUTES: CPT | Mod: 95

## 2024-01-03 NOTE — PLAN
[FreeTextEntry2] : Adjust to life after father's death and reduce symptoms of anxiety and depression 1) Reduce scores on symptom scales by at least 20% 2) Learn at least 2-3 coping skills [Psychoeducation] : Psychoeducation  [Supportive Therapy] : Supportive Therapy [de-identified] : PROGRESS TO DATE/UPDATES: looking forward to job interview this Friday down [place], comparing her situation to others. feeling alone and without family. considering going to group for family members of alcoholics  INTERVENTIONS/PLAN: Socratic dialogue to help pt explore and identify definition of family. Psychoeducation provided regarding trauma response, assimilated and over-accommodated beliefs.  NOTE: pt stopped visit during this dialogue to vomit. When pt returned to visit, processed somatic reaction and re-introduced possibility of starting trauma-focused treatment. Briefly discussed treatment options and addressed pt questions. Handout provided. For home practice, pt will consider her readiness to start trauma treatment vs. continue grief therapy. [Return in ____ week(s)] : Return in [unfilled] week(s) [FreeTextEntry1] : Continue supportive grief counseling with CBT/DBT skills as appropriate. Continue monitoring risk level Monitor potential need for trauma-focused psychotherapy

## 2024-01-03 NOTE — REASON FOR VISIT
[Patient preference] : as per patient preference [Access issues (e.g., transportation, impaired mobility, etc.)] : due to patient's access issues [Continuing, patient not seen in-person within last 12 months (provide details below)] : Telehealth services are continuing, patient not seen in-person within last 12 months.  [Telehealth (audio & video) - Individual/Group] : This visit was provided via telehealth using real-time 2-way audio visual technology. [Other Location: e.g. Home (Enter Location, City,State)___] : The provider was located at [unfilled]. [Home] : The patient, [unfilled], was located at home, [unfilled], at the time of the visit. [Verbal consent obtained from patient/other participant(s)] : Verbal consent for telehealth/telephonic services obtained from patient/other participant(s) [FreeTextEntry4] : 9:33am [FreeTextEntry5] : 10:36am [FreeTextEntry3] : pt preference [Patient] : Patient [FreeTextEntry1] : grief after father's death in October 2023, as well as complex psychosocial history

## 2024-01-03 NOTE — PHYSICAL EXAM
[Average] : average [Cooperative] : cooperative [Depressed] : depressed [Clear] : clear [Circumstantial] : circumstantial [WNL] : within normal limits [FreeTextEntry8] : tired [de-identified] : somewhat constricted. more anxious during trauma discussion

## 2024-01-12 ENCOUNTER — APPOINTMENT (OUTPATIENT)
Dept: PSYCHIATRY | Facility: CLINIC | Age: 34
End: 2024-01-12
Payer: COMMERCIAL

## 2024-01-12 ENCOUNTER — APPOINTMENT (OUTPATIENT)
Dept: BARIATRICS | Facility: CLINIC | Age: 34
End: 2024-01-12
Payer: COMMERCIAL

## 2024-01-12 VITALS
HEIGHT: 62 IN | OXYGEN SATURATION: 99 % | SYSTOLIC BLOOD PRESSURE: 122 MMHG | BODY MASS INDEX: 31.34 KG/M2 | HEART RATE: 74 BPM | DIASTOLIC BLOOD PRESSURE: 79 MMHG | TEMPERATURE: 97.2 F | WEIGHT: 170.31 LBS

## 2024-01-12 DIAGNOSIS — F32.A DEPRESSION, UNSPECIFIED: ICD-10-CM

## 2024-01-12 DIAGNOSIS — E66.9 OBESITY, UNSPECIFIED: ICD-10-CM

## 2024-01-12 DIAGNOSIS — E55.9 VITAMIN D DEFICIENCY, UNSPECIFIED: ICD-10-CM

## 2024-01-12 PROCEDURE — G0447 BEHAVIOR COUNSEL OBESITY 15M: CPT | Mod: 59

## 2024-01-12 PROCEDURE — 99215 OFFICE O/P EST HI 40 MIN: CPT

## 2024-01-12 PROCEDURE — 90837 PSYTX W PT 60 MINUTES: CPT | Mod: 95

## 2024-01-12 NOTE — HISTORY OF PRESENT ILLNESS
[FreeTextEntry1] : 33 year old female with PCOS presents with her spouse for follow up of weight gain. She reports that she began to struggle with her weight as a teen. She was her heaviest in high school at 260 lbs. In college she was able to go down to 180 lb with strict diet and exercise. She slowly regained the weight and weight back up to 250 lbs. She started the Keto diet and was able to get down to 225 lbs. In 11/2021 she was started on phentermine and went down to 165 lbs in 7/2022 while being seen in the  weight loss clinic. She has gained 5 lbs since being off of the phentermine.  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 Recently had to have the fistula fixed again as well as a sphincterectomy. This has impacted her eating. She feels nervous to eat outside in public in fear that she will need to go to the bathroom urgently. She also endorses some dietary indiscretion from time to time. She has a very busy schedule (works 6 days a week) and does not always have time to meal prep. She stopped exercising in 2020 due to LBP.  She does suffer from depression/anxiety. She admits to smoking marijuana daily before bed. Does not do it earlier in fear of craving foods and gaining weight.   Since her last visit, she lost her father and was grieving. She does feel that she was not as consistent with her diet as she previously was. She also was not exercising. She does take Contrave 2 tabs qam. She is upset she gained 3 lbs. Feels mood is improving. Denies any SI. HR/BP wnl. BMs are regular denies any constipation. She is scheduled for a panniculectomy in April.

## 2024-01-12 NOTE — ASSESSMENT
[FreeTextEntry1] : Patient with BMI of 31.09 kg/m2. Refocus efforts on lifestyle modifications. I have recommended she f/u with the RD for additional education. Discussed resuming exercise routine consistently. At least 15 minutes was spent during the visit on obesity counseling.  Patient's feelings of grief were validated. Spent time discussing this is normal and working on resuming lifestyle modification efforts. Discussed the importance of resuming lifestyle modification efforts. She is motivated to get back to healthy habits.  Continue Contrave 2 tabs daily and can titrate to 4 tabs. Will monitor for new s/sx and call if she develops any. Discussed the need to wean off of Contrave prior to surgery. Will decrease by 1 pill per week and make sure to stop completely 2 weeks before surgery.  Post op can consider wellbutrin to help with mood if it is not appropriate to resume Contrave. Or can consider Zepbound with careful monitoring of BMs given h/o fistula, etc  Agree with panniculectomy.   Emotional support provided. All of her questions were answered.  f/u with covering provider in Mid March and 1 month after surgery f/u with me in August

## 2024-01-17 NOTE — PLAN
[FreeTextEntry2] : Adjust to life after father's death and reduce symptoms of anxiety and depression 1) Reduce scores on symptom scales by at least 20% 2) Learn at least 2-3 coping skills [Psychoeducation] : Psychoeducation  [Supportive Therapy] : Supportive Therapy [de-identified] : PROGRESS TO DATE/UPDATES: Crying less often, starting to feel a little better; acknowledging pleasant things in her life.  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 Starting going to gym and talking to people there. Still considering her readiness to pursue trauma-focused therapy Describes depression is more manageable than at therapy baseline. Anxiety worsens at night. Coworker passed away this week - magnified worries about people dying  INTERVENTIONS/PLAN: Validation and reinforcement of therapeutic engagement provided. grief counseling - discussion regarding what those we lose leave behind, different ways of showing love and defining family Socratic dialogue regarding self-compassion [Return in ____ week(s)] : Return in [unfilled] week(s) [FreeTextEntry1] : BH PLAN: Continue supportive grief counseling with CBT/DBT skills as appropriate. Continue monitoring risk level Monitor potential need for trauma-focused psychotherapy No

## 2024-01-17 NOTE — PHYSICAL EXAM
[Average] : average [Cooperative] : cooperative [Euthymic] : euthymic [Labile] : labile [Clear] : clear [Linear/Goal Directed] : linear/goal directed [WNL] : within normal limits [FreeTextEntry8] : anxious at night [de-identified] : tearful

## 2024-01-17 NOTE — REASON FOR VISIT
[Patient preference] : as per patient preference [Access issues (e.g., transportation, impaired mobility, etc.)] : due to patient's access issues [Continuing, patient not seen in-person within last 12 months (provide details below)] : Telehealth services are continuing, patient not seen in-person within last 12 months.  [Telehealth (audio & video) - Individual/Group] : This visit was provided via telehealth using real-time 2-way audio visual technology. [Other Location: e.g. Home (Enter Location, City,State)___] : The provider was located at [unfilled]. [Home] : The patient, [unfilled], was located at home, [unfilled], at the time of the visit. [Verbal consent obtained from patient/other participant(s)] : Verbal consent for telehealth/telephonic services obtained from patient/other participant(s) [FreeTextEntry4] : 5:04pm [FreeTextEntry5] : 5:58pm [FreeTextEntry3] : pt preference [Patient] : Patient [FreeTextEntry1] : grief after father's death in October 2023, as well as complex psychosocial history

## 2024-01-19 ENCOUNTER — APPOINTMENT (OUTPATIENT)
Dept: PSYCHIATRY | Facility: CLINIC | Age: 34
End: 2024-01-19
Payer: COMMERCIAL

## 2024-01-19 PROCEDURE — 90837 PSYTX W PT 60 MINUTES: CPT | Mod: 95

## 2024-01-22 NOTE — REASON FOR VISIT
[Patient preference] : as per patient preference [Access issues (e.g., transportation, impaired mobility, etc.)] : due to patient's access issues [Continuing, patient not seen in-person within last 12 months (provide details below)] : Telehealth services are continuing, patient not seen in-person within last 12 months.  [Telehealth (audio & video) - Individual/Group] : This visit was provided via telehealth using real-time 2-way audio visual technology. [Other Location: e.g. Home (Enter Location, City,State)___] : The provider was located at [unfilled]. [Home] : The patient, [unfilled], was located at home, [unfilled], at the time of the visit. [Verbal consent obtained from patient/other participant(s)] : Verbal consent for telehealth/telephonic services obtained from patient/other participant(s) [FreeTextEntry4] : 5:05pm [FreeTextEntry5] : 6:01pm [FreeTextEntry3] : pt preference [Patient] : Patient [FreeTextEntry1] : grief after father's death in October 2023, as well as complex psychosocial history

## 2024-01-22 NOTE — PHYSICAL EXAM
[Average] : average [Cooperative] : cooperative [Euthymic] : euthymic [Labile] : labile [Clear] : clear [Linear/Goal Directed] : linear/goal directed [WNL] : within normal limits

## 2024-01-22 NOTE — PLAN
[Cognitive and/or Behavior Therapy] : Cognitive and/or Behavior Therapy  [FreeTextEntry2] : Adjust to life after father's death and reduce symptoms of anxiety and depression 1) Reduce scores on symptom scales by at least 20% 2) Learn at least 2-3 coping skills [Supportive Therapy] : Supportive Therapy [de-identified] : PROGRESS TO DATE/UPDATES: Rating scales re-administered in session today. PHQ-9 somewhat improved; CHRIS-7 similar to baseline. Pt reports she is no longer feeling as acutely distressed regarding grief over father's death and is interested in exploring trauma-focused psychotherapy. Expressed hope that therapy can help alleviate her daily anxiety  Placed positive self-talk notes in bathroom and by computer -- finds them to be helping; though, feels silly that a small intervention has helped. Some guilt for feeling like life has improved since dad passed away  INTERVENTIONS/PLAN: Reinforced self-compassion and care. Discussed emotional consequences of unhelpful thought patterns, including arbitrary rules. Psychoeducation regarding grief and Socratic dialogue on pt's expectations for grief process. Reviewed progress towards treatment goals and collaboratively agreed to reassessment of trauma and anxiety symptoms to help inform treatment planning (trauma-focused therapy vs. referral to general therapist to address anxiety) after pt starts new job on 2/5/24. [Return in ____ week(s)] : Return in [unfilled] week(s) [FreeTextEntry1] : BH PLAN: Continue supportive grief counseling with CBT/DBT skills as appropriate. Continue monitoring risk level Monitor potential need for trauma-focused psychotherapy vs. referral to general therapist to treat underlying anxiety

## 2024-01-26 ENCOUNTER — APPOINTMENT (OUTPATIENT)
Dept: PSYCHIATRY | Facility: CLINIC | Age: 34
End: 2024-01-26
Payer: COMMERCIAL

## 2024-01-26 PROCEDURE — 90837 PSYTX W PT 60 MINUTES: CPT | Mod: 95

## 2024-01-29 ENCOUNTER — APPOINTMENT (OUTPATIENT)
Dept: BARIATRICS | Facility: CLINIC | Age: 34
End: 2024-01-29

## 2024-01-30 NOTE — PLAN
[FreeTextEntry2] : Adjust to life after father's death and reduce symptoms of anxiety and depression 1) Reduce scores on symptom scales by at least 20% 2) Learn at least 2-3 coping skills [Cognitive and/or Behavior Therapy] : Cognitive and/or Behavior Therapy  [Supportive Therapy] : Supportive Therapy [de-identified] : PROGRESS TO DATE/UPDATES: Pt thinks she had a panic attack yesterday - couldn't breathe, felt overwhelmed, crying, tried to tell self she was over-reacting/faking it, felt out of control of self - preceded by increased anxiety during the day after a 2nd staff member's passing.  Feels scared that something bad will happen to her because life is going well now  INTERVENTIONS/PLAN: Reflective listening. Discussed thoughts, cognitive distortions, and potential ways of interacting with ones thoughts. Socratic dialogue to support pt's self-reflection and exploration of anxiety and source. Discussed trauma reaction (introducing assimilated and over-accommodated beliefs). Socratic dialogue to gently begin to assess readiness for change and strengths of beliefs.  For home practice, pt elected an act of self-compassion.  [Return in ____ week(s)] : Return in [unfilled] week(s) [FreeTextEntry1] : BH PLAN: Continue supportive grief counseling with CBT/DBT skills as appropriate. Continue monitoring risk level Assess potential need for trauma-focused psychotherapy vs. referral to general therapist to treat underlying anxiety

## 2024-01-30 NOTE — REASON FOR VISIT
[Patient preference] : as per patient preference [Access issues (e.g., transportation, impaired mobility, etc.)] : due to patient's access issues [Continuing, patient not seen in-person within last 12 months (provide details below)] : Telehealth services are continuing, patient not seen in-person within last 12 months.  [Telehealth (audio & video) - Individual/Group] : This visit was provided via telehealth using real-time 2-way audio visual technology. [Other Location: e.g. Home (Enter Location, City,State)___] : The patient, [unfilled], was located at [unfilled] at the time of the visit. [Verbal consent obtained from patient/other participant(s)] : Verbal consent for telehealth/telephonic services obtained from patient/other participant(s) [FreeTextEntry4] : 5:00pm [FreeTextEntry5] : 6:00pm [FreeTextEntry3] : pt preference [Patient] : Patient [FreeTextEntry1] : grief after father's death in October 2023, as well as complex psychosocial history

## 2024-01-30 NOTE — PHYSICAL EXAM
[Average] : average [Cooperative] : cooperative [Anxious] : anxious [Clear] : clear [Linear/Goal Directed] : linear/goal directed [WNL] : within normal limits [de-identified] : anxious, tearful

## 2024-02-01 ENCOUNTER — APPOINTMENT (OUTPATIENT)
Dept: PSYCHIATRY | Facility: CLINIC | Age: 34
End: 2024-02-01
Payer: COMMERCIAL

## 2024-02-01 PROCEDURE — 90834 PSYTX W PT 45 MINUTES: CPT | Mod: 95

## 2024-02-02 ENCOUNTER — NON-APPOINTMENT (OUTPATIENT)
Age: 34
End: 2024-02-02

## 2024-02-02 NOTE — PLAN
[FreeTextEntry2] : Adjust to life after father's death and reduce symptoms of anxiety and depression 1) Reduce scores on symptom scales by at least 20% 2) Learn at least 2-3 coping skills [Cognitive and/or Behavior Therapy] : Cognitive and/or Behavior Therapy  [Supportive Therapy] : Supportive Therapy [de-identified] :  PROGRESS TO DATE/UPDATES: Feeling more tired in past week Caught self experiencing automatic, negative and unhelpful thoughts Completed self-compassion acts. Would like to learn about anxiety coping tools to support transition to new job  INTERVENTIONS/PLAN: - Coping ahead for anxiety related to starting new job next week (best/worse case vs. likely, other thought-challenging strategies) - psychoeducation provided regarding Fight/flight/freeze vs. rest/digest.  taught square breathing, 4-7-8 breathing, and diaphragmatic breathing. Addressed pt's questions regarding mindfulness practices  [Return in ____ week(s)] : Return in [unfilled] week(s) [FreeTextEntry1] : BH PLAN: Continue supportive grief counseling with CBT/DBT skills as appropriate. Continue monitoring risk level Assess potential need for trauma-focused psychotherapy vs. referral to general therapist to treat underlying anxiety

## 2024-02-02 NOTE — REASON FOR VISIT
[Patient preference] : as per patient preference [Access issues (e.g., transportation, impaired mobility, etc.)] : due to patient's access issues [Continuing, patient not seen in-person within last 12 months (provide details below)] : Telehealth services are continuing, patient not seen in-person within last 12 months.  [Telehealth (audio & video) - Individual/Group] : This visit was provided via telehealth using real-time 2-way audio visual technology. [Other Location: e.g. Home (Enter Location, City,State)___] : The patient, [unfilled], was located at [unfilled] at the time of the visit. [Verbal consent obtained from patient/other participant(s)] : Verbal consent for telehealth/telephonic services obtained from patient/other participant(s) [FreeTextEntry4] : 3:12pm [FreeTextEntry5] : 4:02pm [FreeTextEntry3] : pt preference [Patient] : Patient [FreeTextEntry1] : grief after father's death in October 2023, as well as complex psychosocial history

## 2024-02-09 ENCOUNTER — APPOINTMENT (OUTPATIENT)
Dept: PSYCHIATRY | Facility: CLINIC | Age: 34
End: 2024-02-09
Payer: COMMERCIAL

## 2024-02-09 PROCEDURE — 90837 PSYTX W PT 60 MINUTES: CPT | Mod: 95

## 2024-02-09 NOTE — PLAN
[FreeTextEntry2] : Adjust to life after father's death and reduce symptoms of anxiety and depression 1) Reduce scores on symptom scales by at least 20% 2) Learn at least 2-3 coping skills [Supportive Therapy] : Supportive Therapy [Cognitive and/or Behavior Therapy] : Cognitive and/or Behavior Therapy  [de-identified] : PROGRESS TO DATE/UPDATES: has had COVID-19 so work start was delayed until yesterday.  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  Implemented diaphragmatic breathing to successfully calm self. Also starting to challenge intrusive thoughts ("is this helpful?") needs to go to  father's house to continue clean up, but uncomfortable being in the house alone  INTERVENTIONS/PLAN: reinforced pts' use of relaxation skills and values-committed action grief processing and discussion regarding what is within her immediate control pt has been attempting to implement previously learned DBT skills for emotional awareness and communication. Socratic dialogue reinforcing pt's adaptive and healthy coping skills. Reflective discussion regarding pt's various caretaking goals and personal boundaries reviewed plan to re-assess symptoms to inform treatment planning. Pt would like to pursue re-assessment at the next visit  [Return in ____ week(s)] : Return in [unfilled] week(s) [FreeTextEntry1] : BH PLAN: Continue supportive grief counseling with CBT/DBT skills as appropriate. Continue monitoring risk level Assess potential need for trauma-focused psychotherapy vs. referral to general therapist to treat underlying anxiety

## 2024-02-09 NOTE — REASON FOR VISIT
Received report from SIMBA Reid.    [Patient preference] : as per patient preference [Access issues (e.g., transportation, impaired mobility, etc.)] : due to patient's access issues [Continuing, patient not seen in-person within last 12 months (provide details below)] : Telehealth services are continuing, patient not seen in-person within last 12 months.  [Telehealth (audio & video) - Individual/Group] : This visit was provided via telehealth using real-time 2-way audio visual technology. [Other Location: e.g. Home (Enter Location, City,State)___] : The provider was located at [unfilled]. [Home] : The patient, [unfilled], was located at home, [unfilled], at the time of the visit. [Verbal consent obtained from patient/other participant(s)] : Verbal consent for telehealth/telephonic services obtained from patient/other participant(s) [FreeTextEntry4] : 5:07pm [FreeTextEntry5] : 6:02pm [FreeTextEntry3] : pt preference [Patient] : Patient [FreeTextEntry1] : grief after father's death in October 2023, as well as complex psychosocial history

## 2024-02-23 ENCOUNTER — APPOINTMENT (OUTPATIENT)
Dept: PSYCHIATRY | Facility: CLINIC | Age: 34
End: 2024-02-23

## 2024-02-27 ENCOUNTER — APPOINTMENT (OUTPATIENT)
Dept: PSYCHIATRY | Facility: CLINIC | Age: 34
End: 2024-02-27
Payer: COMMERCIAL

## 2024-02-27 PROCEDURE — 90837 PSYTX W PT 60 MINUTES: CPT | Mod: 95

## 2024-03-05 NOTE — REASON FOR VISIT
[Patient preference] : as per patient preference [Access issues (e.g., transportation, impaired mobility, etc.)] : due to patient's access issues [Continuing, patient not seen in-person within last 12 months (provide details below)] : Telehealth services are continuing, patient not seen in-person within last 12 months.  [Telehealth (audio & video) - Individual/Group] : This visit was provided via telehealth using real-time 2-way audio visual technology. [Other Location: e.g. Home (Enter Location, City,State)___] : The provider was located at [unfilled]. [Home] : The patient, [unfilled], was located at home, [unfilled], at the time of the visit. [Verbal consent obtained from patient/other participant(s)] : Verbal consent for telehealth/telephonic services obtained from patient/other participant(s) [Patient] : Patient [FreeTextEntry4] : 4:05pm [FreeTextEntry5] : 5:08pm [FreeTextEntry3] : access issue [FreeTextEntry1] : grief after father's death in October 2023, as well as complex psychosocial history

## 2024-03-05 NOTE — PHYSICAL EXAM
[Intermittent] : intermittent [Cooperative] : cooperative [Depressed] : depressed [Clear] : clear [Linear/Goal Directed] : linear/goal directed [WNL] : within normal limits [de-identified] : sad

## 2024-03-05 NOTE — PLAN
[Cognitive and/or Behavior Therapy] : Cognitive and/or Behavior Therapy  [Supportive Therapy] : Supportive Therapy [Return in ____ week(s)] : Return in [unfilled] week(s) [FreeTextEntry2] : Adjust to life after father's death and reduce symptoms of anxiety and depression 1) Reduce scores on symptom scales by at least 20% 2) Learn at least 2-3 coping skills [FreeTextEntry1] : BH PLAN: Continue supportive grief counseling with CBT/DBT skills as appropriate. Continue monitoring risk level Assess potential need for trauma-focused psychotherapy vs. referral to general therapist to treat underlying anxiety  [de-identified] :  PROGRESS TO DATE/UPDATES: experiencing mix of positive and unpleasant stressors (e.g., starting new job, fighting with boyfriend, going to  father's house weekly to clean it out)- describes situational mood changes as extreme swings. Pt reflected she "forces [herself] to feel positive and then snap"   INTERVENTIONS/PLAN: Supportive counseling regarding distress of going to father's house independently.  Discussed exposure hierarchy approach. Supported pt in identifying negative cognitions that worsen unpleasant emotions. Briefly reviewed cognitive distortions and thought challenging.  Started re-assessment process and explained what pt can expect.  Administered PHQ-9 (moderate range), CHRIS-7 (severe range), and LEC-5 Extended (endorsed worst event as time mother overdosed & was hospitalized). Validated and reinforced pt for sharing her story. Instilled hope that treatment can continue to help.    Will continue assessment in follow-up visit.

## 2024-03-07 ENCOUNTER — APPOINTMENT (OUTPATIENT)
Dept: PSYCHIATRY | Facility: CLINIC | Age: 34
End: 2024-03-07
Payer: COMMERCIAL

## 2024-03-07 DIAGNOSIS — F41.9 ANXIETY DISORDER, UNSPECIFIED: ICD-10-CM

## 2024-03-07 PROCEDURE — 90837 PSYTX W PT 60 MINUTES: CPT | Mod: 95

## 2024-03-08 ENCOUNTER — APPOINTMENT (OUTPATIENT)
Dept: PLASTIC SURGERY | Facility: CLINIC | Age: 34
End: 2024-03-08
Payer: COMMERCIAL

## 2024-03-08 VITALS
BODY MASS INDEX: 31.28 KG/M2 | DIASTOLIC BLOOD PRESSURE: 60 MMHG | WEIGHT: 170 LBS | OXYGEN SATURATION: 98 % | TEMPERATURE: 98.4 F | HEIGHT: 62 IN | SYSTOLIC BLOOD PRESSURE: 113 MMHG | HEART RATE: 74 BPM

## 2024-03-08 PROBLEM — F41.9 ANXIETY: Status: ACTIVE | Noted: 2023-02-07

## 2024-03-08 PROCEDURE — 99213 OFFICE O/P EST LOW 20 MIN: CPT

## 2024-03-08 NOTE — REASON FOR VISIT
[Patient preference] : as per patient preference [Access issues (e.g., transportation, impaired mobility, etc.)] : due to patient's access issues [Continuing, patient not seen in-person within last 12 months (provide details below)] : Telehealth services are continuing, patient not seen in-person within last 12 months.  [Other Location: e.g. Home (Enter Location, City,State)___] : The provider was located at [unfilled]. [Telehealth (audio & video) - Individual/Group] : This visit was provided via telehealth using real-time 2-way audio visual technology. [Home] : The patient, [unfilled], was located at home, [unfilled], at the time of the visit. [Verbal consent obtained from patient/other participant(s)] : Verbal consent for telehealth/telephonic services obtained from patient/other participant(s) [FreeTextEntry4] : 4:07pm [FreeTextEntry5] : 5:07pm [FreeTextEntry3] : access issue [FreeTextEntry1] : grief after father's death in October 2023, as well as complex psychosocial history [Patient] : Patient
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
## 2024-03-08 NOTE — PLAN
[FreeTextEntry2] : Adjust to life after father's death and reduce symptoms of anxiety and depression 1) Reduce scores on symptom scales by at least 20% 2) Learn at least 2-3 coping skills [Cognitive and/or Behavior Therapy] : Cognitive and/or Behavior Therapy  [Supportive Therapy] : Supportive Therapy [de-identified] :  PROGRESS TO DATE/UPDATES: Attended a service for the colleague who passed away. Remains angry at both of her parents  INTERVENTIONS/PLAN: Supported pt in processing her colleague's service.  Socratic discussion regarding rituals around loss. Discussed pt's experience of anniversaries and milestones related to grief and loss. Reflected pt's pattern of choosing actions/behaviors based how she anticipates other people will react. Pt agreed, connecting it to childhood trauma. Supported pt in challenging unhelpful thoughts.  Administered PCL-5. Pt wanted completed one scale using mother's seizure experience as the index event (SCORE: 35), and a second for parents' overall drug abuse (SCORE: 51). Reports most distressing/impactful symptom is negative beliefs about herself.  Continue re-assessment process in next visit with CAPS-5 interview   [Return in ____ week(s)] : Return in [unfilled] week(s) [FreeTextEntry1] : BH PLAN: Continue supportive grief counseling with CBT/DBT skills as appropriate. Continue monitoring risk level Assess potential need for trauma-focused psychotherapy vs. referral to general therapist to treat underlying anxiety

## 2024-03-08 NOTE — PHYSICAL EXAM
[Average] : average [Cooperative] : cooperative [Euthymic] : euthymic [Clear] : clear [Linear/Goal Directed] : linear/goal directed [WNL] : within normal limits [de-identified] : appropriately varied by session content

## 2024-03-11 NOTE — ADDENDUM
[FreeTextEntry1] :  I, Israel Valdez, documented this note as a scribe on behalf of Dr. Lauren Shikowitz-Behr, MD on 03/08/2024.

## 2024-03-11 NOTE — PHYSICAL EXAM
[de-identified] : NAD, AxOX3  [de-identified] : lower abdominal pannus that hangs below the level of the mons significant skin excess and striae above and below the umbilicus in both the horizontal and vertical dimensions well healed laparoscopic sites no appreciable abdominal hernias [de-identified] : nonlabored breathing  [de-identified] : no edema [de-identified] : normal  [de-identified] : normal affect

## 2024-03-11 NOTE — HISTORY OF PRESENT ILLNESS
[FreeTextEntry1] : DEBORAH HENRY is a 34 year old female who presents today for a preoperative discussion. She is scheduled for Panniculectomy/[id number] on 4/10/24. Patient has no complaints today

## 2024-03-11 NOTE — END OF VISIT
[FreeTextEntry3] : All medical record entries made by the Scribe were at my, Dr. Lauren Shikowitz-Behr, MD, direction and personally dictated by me on 03/08/2024. I have reviewed the chart and agree that the record accurately reflects my personal performance of the history, physical exam, assessment and plan. I have also personally directed, reviewed, and agreed with the chart.

## 2024-03-15 ENCOUNTER — APPOINTMENT (OUTPATIENT)
Dept: PSYCHIATRY | Facility: CLINIC | Age: 34
End: 2024-03-15
Payer: COMMERCIAL

## 2024-03-15 PROCEDURE — 90837 PSYTX W PT 60 MINUTES: CPT | Mod: 95

## 2024-03-19 ENCOUNTER — APPOINTMENT (OUTPATIENT)
Dept: PSYCHIATRY | Facility: CLINIC | Age: 34
End: 2024-03-19

## 2024-03-25 ENCOUNTER — APPOINTMENT (OUTPATIENT)
Dept: PSYCHIATRY | Facility: CLINIC | Age: 34
End: 2024-03-25
Payer: COMMERCIAL

## 2024-03-25 PROCEDURE — 90837 PSYTX W PT 60 MINUTES: CPT | Mod: 95

## 2024-03-26 NOTE — PHYSICAL EXAM
[Average] : average [Cooperative] : cooperative [Clear] : clear [Linear/Goal Directed] : linear/goal directed [WNL] : within normal limits [FreeTextEntry8] : anxious [de-identified] : appropriately varied by session content
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
## 2024-03-26 NOTE — REASON FOR VISIT
[Patient preference] : as per patient preference [Access issues (e.g., transportation, impaired mobility, etc.)] : due to patient's access issues [Continuing, patient not seen in-person within last 12 months (provide details below)] : Telehealth services are continuing, patient not seen in-person within last 12 months.  [Telehealth (audio & video) - Individual/Group] : This visit was provided via telehealth using real-time 2-way audio visual technology. [Other Location: e.g. Home (Enter Location, City,State)___] : The provider was located at [unfilled]. [Home] : The patient, [unfilled], was located at home, [unfilled], at the time of the visit. [Verbal consent obtained from patient/other participant(s)] : Verbal consent for telehealth/telephonic services obtained from patient/other participant(s) [FreeTextEntry4] : 4:10pm [FreeTextEntry5] : 5:09pm [FreeTextEntry3] : access issue [Patient] : Patient [FreeTextEntry1] : grief after father's death in October 2023, as well as complex trauma history

## 2024-03-26 NOTE — PLAN
[FreeTextEntry2] : Adjust to life after father's death and reduce trauma and anxiety symptoms  1) Reduce scores on symptom scales by at least 20% 2) Learn at least 2-3 coping skills [Cognitive Processing Therapy] : Cognitive Processing Therapy  [Supportive Therapy] : Supportive Therapy [de-identified] :  PROGRESS TO DATE/UPDATES: - Feeling overwhelmed after spending long time cleaning dad's house, higher workload on Mondays, and some additional minor stressors. Pt reflects that she may be struggling with stressors more since stopping Contrave and not being able to smoke marijuana due to upcoming surgery. - Has not yet received scheduling call from psychiatry  INTERVENTIONS/PLAN: - reflective listening regarding pt's concerns.  Addressed her questions on anxiety disorders and paranoid personality disorder - reviewed recent re-assessment and clinical impressions. Provided psychoeducation regarding PTSD.  Reviewed treatment options and recommended CPT. Pt agreed and looks forward to this next phase of treatment and acknowledges it may be difficult for her. [Return in ____ week(s)] : Return in [unfilled] week(s) [FreeTextEntry1] :  PLAN: Continue supportive grief counseling with CBT/DBT skills as appropriate. Continue monitoring risk level Begin CPT  Of note:  pt has surgery scheduled April 15, 2024 and plans to need about 2 weeks break from CTSRR visits during initial recovery period.

## 2024-03-26 NOTE — PLAN
[FreeTextEntry2] : Adjust to life after father's death and reduce symptoms of anxiety and depression 1) Reduce scores on symptom scales by at least 20% 2) Learn at least 2-3 coping skills [Supportive Therapy] : Supportive Therapy [de-identified] :  PROGRESS TO DATE/UPDATES: Difficulty managing her anxiety. Has surgery scheduled in April so has had to stop taking her medication, Contrave, which includes an antidepressant. Interested in consulting with a psychiatrist  INTERVENTIONS/PLAN: Session focused on continued re-assessment process via CAPS-5 interview: - moderately severe symptoms of exaggerated startle reflex, limited positive feelings, negative feelings, psychological distress at exposure to external reminders, unwanted intrusive distressing memories - severe or extreme concern with irritability, negative beliefs about self, self-blame - submitted referral to [facility] for psychiatry/med consultation at pt's request [Other: ____] : [unfilled] [Return in ____ week(s)] : Return in [unfilled] week(s) [FreeTextEntry1] : BH PLAN: Continue supportive grief counseling with CBT/DBT skills as appropriate. Continue monitoring risk level Review assessment results and discuss treatment options, including trauma-focused psychotherapy vs. referral to general therapist to treat underlying anxiety

## 2024-03-26 NOTE — PHYSICAL EXAM
[Average] : average [Cooperative] : cooperative [Clear] : clear [Linear/Goal Directed] : linear/goal directed [WNL] : within normal limits [FreeTextEntry8] : anxious [de-identified] : anxious, crying

## 2024-03-26 NOTE — REASON FOR VISIT
[Patient preference] : as per patient preference [Access issues (e.g., transportation, impaired mobility, etc.)] : due to patient's access issues [Continuing, patient not seen in-person within last 12 months (provide details below)] : Telehealth services are continuing, patient not seen in-person within last 12 months.  [Telehealth (audio & video) - Individual/Group] : This visit was provided via telehealth using real-time 2-way audio visual technology. [Other Location: e.g. Home (Enter Location, City,State)___] : The provider was located at [unfilled]. [Home] : The patient, [unfilled], was located at home, [unfilled], at the time of the visit. [Verbal consent obtained from patient/other participant(s)] : Verbal consent for telehealth/telephonic services obtained from patient/other participant(s) [FreeTextEntry4] : 3:05pm [FreeTextEntry5] : 4:09pm [FreeTextEntry3] : access issue [Patient] : Patient [FreeTextEntry1] : grief after father's death in October 2023, as well as complex trauma history

## 2024-04-02 ENCOUNTER — APPOINTMENT (OUTPATIENT)
Dept: INTERNAL MEDICINE | Facility: CLINIC | Age: 34
End: 2024-04-02
Payer: COMMERCIAL

## 2024-04-02 PROCEDURE — 99213 OFFICE O/P EST LOW 20 MIN: CPT

## 2024-04-02 NOTE — HISTORY OF PRESENT ILLNESS
[Home] : at home, [unfilled] , at the time of the visit. [Verbal consent obtained from patient] : the patient, [unfilled] [Other Location: e.g. Home (Enter Location, City,State)___] : at [unfilled] [FreeTextEntry1] : follow up  [de-identified] : 34 year old female calls in for medication discussion. Patient has sx for panniculectomy for 4/15. Patient states she weaned herself off contrave in the beginning of March prior to having surgery. Patient states she was having abdominal discomfort and nausea with the medication and now off of it feels great. Patient states she goes to the [facility] and exercises everyday. Patients concern is gaining weight while recovering from surgey. Due to her hx of colorectal surgeries and fistulas patient does not want to take medications that will cause constipation potentially. we discussed medications and options. Patient did well on Phentermine in the past she will give it considerations gain.

## 2024-04-04 ENCOUNTER — APPOINTMENT (OUTPATIENT)
Dept: OBGYN | Facility: CLINIC | Age: 34
End: 2024-04-04
Payer: COMMERCIAL

## 2024-04-04 ENCOUNTER — OUTPATIENT (OUTPATIENT)
Dept: OUTPATIENT SERVICES | Facility: HOSPITAL | Age: 34
LOS: 1 days | End: 2024-04-04
Payer: COMMERCIAL

## 2024-04-04 VITALS
TEMPERATURE: 98 F | HEART RATE: 56 BPM | DIASTOLIC BLOOD PRESSURE: 70 MMHG | HEIGHT: 62 IN | SYSTOLIC BLOOD PRESSURE: 118 MMHG | BODY MASS INDEX: 31.28 KG/M2 | WEIGHT: 170 LBS | OXYGEN SATURATION: 98 %

## 2024-04-04 VITALS
RESPIRATION RATE: 18 BRPM | OXYGEN SATURATION: 95 % | WEIGHT: 171.08 LBS | HEART RATE: 78 BPM | HEIGHT: 62 IN | TEMPERATURE: 98 F | SYSTOLIC BLOOD PRESSURE: 122 MMHG | DIASTOLIC BLOOD PRESSURE: 83 MMHG

## 2024-04-04 DIAGNOSIS — Z01.818 ENCOUNTER FOR OTHER PREPROCEDURAL EXAMINATION: ICD-10-CM

## 2024-04-04 DIAGNOSIS — R63.4 ABNORMAL WEIGHT LOSS: ICD-10-CM

## 2024-04-04 DIAGNOSIS — E65 LOCALIZED ADIPOSITY: ICD-10-CM

## 2024-04-04 DIAGNOSIS — M79.3 PANNICULITIS, UNSPECIFIED: ICD-10-CM

## 2024-04-04 DIAGNOSIS — L29.2 PRURITUS VULVAE: ICD-10-CM

## 2024-04-04 DIAGNOSIS — L70.9 ACNE, UNSPECIFIED: ICD-10-CM

## 2024-04-04 DIAGNOSIS — Z98.890 OTHER SPECIFIED POSTPROCEDURAL STATES: Chronic | ICD-10-CM

## 2024-04-04 LAB
BLD GP AB SCN SERPL QL: SIGNIFICANT CHANGE UP
HCT VFR BLD CALC: 40.9 % — SIGNIFICANT CHANGE UP (ref 34.5–45)
HGB BLD-MCNC: 13.9 G/DL — SIGNIFICANT CHANGE UP (ref 11.5–15.5)
MCHC RBC-ENTMCNC: 31.4 PG — SIGNIFICANT CHANGE UP (ref 27–34)
MCHC RBC-ENTMCNC: 34 GM/DL — SIGNIFICANT CHANGE UP (ref 32–36)
MCV RBC AUTO: 92.3 FL — SIGNIFICANT CHANGE UP (ref 80–100)
NRBC # BLD: 0 /100 WBCS — SIGNIFICANT CHANGE UP (ref 0–0)
PLATELET # BLD AUTO: 187 K/UL — SIGNIFICANT CHANGE UP (ref 150–400)
RBC # BLD: 4.43 M/UL — SIGNIFICANT CHANGE UP (ref 3.8–5.2)
RBC # FLD: 12.8 % — SIGNIFICANT CHANGE UP (ref 10.3–14.5)
WBC # BLD: 5.42 K/UL — SIGNIFICANT CHANGE UP (ref 3.8–10.5)
WBC # FLD AUTO: 5.42 K/UL — SIGNIFICANT CHANGE UP (ref 3.8–10.5)

## 2024-04-04 PROCEDURE — 85027 COMPLETE CBC AUTOMATED: CPT

## 2024-04-04 PROCEDURE — G0463: CPT

## 2024-04-04 PROCEDURE — 86850 RBC ANTIBODY SCREEN: CPT

## 2024-04-04 PROCEDURE — 86901 BLOOD TYPING SEROLOGIC RH(D): CPT

## 2024-04-04 PROCEDURE — 36415 COLL VENOUS BLD VENIPUNCTURE: CPT

## 2024-04-04 PROCEDURE — 86900 BLOOD TYPING SEROLOGIC ABO: CPT

## 2024-04-04 PROCEDURE — 99213 OFFICE O/P EST LOW 20 MIN: CPT

## 2024-04-04 RX ORDER — CLOBETASOL PROPIONATE 0.5 MG/G
0.05 OINTMENT TOPICAL
Qty: 1 | Refills: 1 | Status: ACTIVE | COMMUNITY
Start: 2024-04-04 | End: 1900-01-01

## 2024-04-04 NOTE — PHYSICAL EXAM
[Chaperone Declined] : Patient declined chaperone [Appropriately responsive] : appropriately responsive [Alert] : alert [No Acute Distress] : no acute distress [Soft] : soft [Non-tender] : non-tender [Non-distended] : non-distended [Oriented x3] : oriented x3 [FreeTextEntry4] : Color pink, no distress, [FreeTextEntry5] : Resp. rate wnl, color pink, no SOB [Labia Majora] : normal [Labia Minora] : normal [Normal] : normal [Declined] : Patient declined rectal exam [FreeTextEntry2] : Inner R labia 10:00 with 1.5cm of white plaque noted with 4 other very small area adjascent similar in apearance at pt's area of concern.  Another small area noted on L inner vulva at 2:00.

## 2024-04-04 NOTE — H&P PST ADULT - HISTORY OF PRESENT ILLNESS
35y/o female with medical h/o Panniculitis unspecified s/p weight loss, presents today for PST for scheduled Abdominal Panniculectomy on 4/15/2024 33y/o female with medical h/o Insomnia, reports Panniculitis unspecified s/p weight loss, presents today for PST for scheduled Abdominal Panniculectomy on 4/15/2024

## 2024-04-04 NOTE — H&P PST ADULT - NSANTHOSAYNRD_GEN_A_CORE
neck 14inches/No. PAUL screening performed.  STOP BANG Legend: 0-2 = LOW Risk; 3-4 = INTERMEDIATE Risk; 5-8 = HIGH Risk

## 2024-04-04 NOTE — H&P PST ADULT - DIAGNOSTICS LABS REVIEWED
Progress Notes by Leigh Crouch LCSW at 09/27/18 03:02 PM     Author:  Leigh Crouch LCSW Service:  (none) Author Type:       Filed:  09/27/18 04:03 PM Encounter Date:  9/27/2018 Status:  Signed     :  Leigh Crouch LCSW ()            PROGRESS NOTE    Patient name and date of birth was confirmed.    Time spent with patient:[DC1.1T] 55[DC1.2M] minutes Time in[DC1.1T] 3:02[DC1.1M] Time out[DC1.1T] 3:57[DC1.2M]    Target issue:[DC1.1T] depression[DC1.2M]    TREATMENT PLAN  This plan was made in collaboration with patient.[DC1.1T]  Formal treatment Plan Review Date (every two months):[DC1.1C] 10[DC1.1M]/13/18      Long Term Goals:   -- Reduce overall frequency, intenisity and duration of anxiety so that daily functioning is not impaired.  -- Learn and implement coping skills that results in a reduction of anxiety and worry and improved daily functioning.  -- Resolve conflicted feelings and adapt to the new life circumstances.  -- Reorient life view to recognize the advantages of the current situation.      Short Term Objectives   Celi will describe situations, thoughts, feelings and actions associated with anxieties and worries, their impact on functioning and attempts to resolve them.  Therapeutic Intervention (what therapist will do)  Use client centered therapy to develop a level of trust with Celi by providing support and empathy to encourage Celi to feel safe in expressing her symptoms.      Short Term Objectives   Celi will learn and implement calming skills to reduce overall anxiety and manage anxiety symptoms.  Therapeutic Intervention  Use psycho educational therapy to teach Celi relaxation, skills, progressive muscle relaxation, mindful breathing, meditation, and guided imagery.      Short Term Objectives   Celi will Identify, challenge, and replace fearful self-talk with positive, realistic and empowering self-talk.  Therapeutic Intervention   Use CBT  to identify irrational self talk and help Celi replace distorted messages with positive, rational self talk.      Short term objective  Celi will describe the circumstances of life that are contributing to stress, anxiety or lack of fulfillment.  Therapeutic Intervention  Use Client centered therapy to provide warm, safe, empathic environment in which Celi can share circumstances that are causing frustration, anxiety, depression, or lack of fulfillment.    [DC1.1C]      Data/presenting issue(s) and notable behavior/thought process/ affect  Celi explained difficulties with[DC1.1T] depressed[DC1.2M] feelings since the last session and[DC1.1T] conflict with daughter[DC1.2M] that ha[DC1.1T]s[DC1.2M] contributed to the[DC1.1T] depression[DC1.2M] . She states that[DC1.1T] her daughter is very optimistic and Giuseppe states she is not so much. Giuseppe reports she doesn't feel depressed as before but had her car and computer give her problems and wanted to vent to her daughter. Giuseppe states her daughter doesn't want to hear it. Discussed using assertiveness with her daughter to tell her that Giuseppe wants her to listen when she wants to vent. Giuseppe agreed to talk to her daughter about this[DC1.2M].    Celi presented[DC1.1T] appropriate[DC1.2M] throughout the session. She participated fully. She shows[DC1.1T] normal[DC1.2M] insight into presenting problems.    Assessment/Progress  Based on Celi's report and this counselors observations, Celi's[DC1.1T] depression  and anxiety[DC1.2M] is[DC1.1T] stable[DC1.2M] Celi continues to meet criteria for[DC1.1T] Anxiety, generalized[DC1.2M] .     Therapeutic intervention provided:[DC1.1T]   Therapeutic Intervention   Use[DC1.2T]d[DC1.2M] client centered therapy to develop a level of trust with Celi by providing support and empathy to encourage Celi to feel safe in expressing her symptoms.  Therapeutic Intervention  Use[DC1.2T]d[DC1.2M] interpersonal therapy to teach  Celi communication skills, conflict resolution skills, provide modeling and use role playing to build Celi   skills.[DC1.2T]        Plan/ Client's planned actions toward goals:  Celi reports she will[DC1.1T] practice assertiveness with daughter[DC1.2M].    Plan Related to Treatment Services:  Based upon the above information the following recommendations are made:    · Continue[DC1.1T] Individual[DC1.2M]   Therapy   · Frequency of sessions: Every[DC1.1T] 2-3 weeks[DC1.2M]      She was made aware of these recommendations and[DC1.1T] did[DC1.2M] agree to them.  She was made aware of how to contact the undersigned in case of an emergency.      Risk factors:  Celi[DC1.1T] Denied[DC1.2M]  thoughts of risk of harm to self or others.     Patient will go to the nearest emergency room or call 9-1-1 if patient ever reports suicidal feelings, thoughts or plans, or if they believe patient may be a threat to self or others.     Electronically Signed by:    Leigh Crouch LCSW , 9/27/2018[DC1.1T]                   Revision History        User Key Date/Time User Provider Type Action    > DC1.2 09/27/18 04:03 PM Leigh Crouch LCSW  Sign     DC1.1 09/27/18 03:02 PM Leigh Crouch LCSW      C - Copied, M - Manual, T - Template             No

## 2024-04-04 NOTE — HISTORY OF PRESENT ILLNESS
[FreeTextEntry1] : 35yo presents with c/o localized area of pruritus on her vulva for over a week.  Pt. is scheduled for surgery (abdominoplasty) after loosing 100 lbs.  Pt. states she has been under a great deal of stress and is under care of therapist as well as has appointment with psych coming up.  Surgery is scheduled for the 15th of April.  Pt.'s Hx is significant for psoriasis which she states has been an issue lately r/t stress.

## 2024-04-04 NOTE — H&P PST ADULT - NSICDXPASTSURGICALHX_GEN_ALL_CORE_FT
PAST SURGICAL HISTORY:  H/O lumbar discectomy     History of cholecystectomy      PAST SURGICAL HISTORY:  Colonic fistula     H/O lumbar discectomy     H/O rectal sphincterotomy     History of cholecystectomy

## 2024-04-04 NOTE — H&P PST ADULT - NSICDXPASTMEDICALHX_GEN_ALL_CORE_FT
PAST MEDICAL HISTORY:  No pertinent past medical history      PAST MEDICAL HISTORY:  Acne     Insomnia     Panniculitis

## 2024-04-04 NOTE — PLAN
[FreeTextEntry1] : Vulvar Itching, area of white plaque noted at pt's area of concern. Lichen Sclerosis vs Psoriasis vs infection -eRx clobetasol 0.05mg twice a week x 2 weeks and then twice a week -BD affirm today -discussed rec. for biopsy since cancers can look very similar to other vulvar plaques.  Pt. is having surgery on the 15th of this month and states no time for Biopsy at this time. Discussed trying a course of medication and f/u in 2 months to see if symptoms, plaque resolves and if no to have vulvar biopsy.  Pt. verbalized understanding and in agreement with plan.  -f/u with psych for stress RTO 2 months

## 2024-04-05 ENCOUNTER — APPOINTMENT (OUTPATIENT)
Dept: PSYCHIATRY | Facility: CLINIC | Age: 34
End: 2024-04-05
Payer: COMMERCIAL

## 2024-04-05 LAB
CANDIDA VAG CYTO: NOT DETECTED
G VAGINALIS+PREV SP MTYP VAG QL MICRO: NOT DETECTED
T VAGINALIS VAG QL WET PREP: NOT DETECTED

## 2024-04-05 PROCEDURE — 90837 PSYTX W PT 60 MINUTES: CPT | Mod: 95

## 2024-04-05 NOTE — PLAN
[FreeTextEntry2] : Adjust to life after father's death and reduce trauma and anxiety symptoms  1) Reduce scores on symptom scales by at least 20% 2) Learn at least 2-3 coping skills [Cognitive Processing Therapy] : Cognitive Processing Therapy  [Supportive Therapy] : Supportive Therapy [de-identified] :  PROGRESS TO DATE/UPDATES: This past Sunday was anniversary of mother's death, then found out her father's cause of death. Pt angry that others do whatever they want, when in contrast she doesn't.  Difficulty sleeping in past week, which negatively impacted mood.  Pt reached out to friends for social support and reconnected with her brother and cousin.  INTERVENTIONS/PLAN: Grief and supportive counseling. Validated and normalized pt's emotional and cognitive reactions. Provided psychoeducation regarding common trauma reactions and Cognitive Processing Therapy (CPT).  Assigned impact statement for homework. Handout provided.  [Return in ____ week(s)] : Return in [unfilled] week(s) [FreeTextEntry1] :  PLAN: Continue supportive grief counseling with CBT/DBT skills as appropriate. Continue monitoring risk level Continue CPT  Of note:  pt has surgery scheduled April 15, 2024 and plans to need about 2 weeks break from CTSRR visits during initial recovery period.

## 2024-04-05 NOTE — PHYSICAL EXAM
[Average] : average [Cooperative] : cooperative [Depressed] : depressed [Anxious] : anxious [Irritable] : irritable [Clear] : clear [Linear/Goal Directed] : linear/goal directed [WNL] : within normal limits [de-identified] : anxious, crying

## 2024-04-05 NOTE — REASON FOR VISIT
[Patient preference] : as per patient preference [Access issues (e.g., transportation, impaired mobility, etc.)] : due to patient's access issues [Continuing, patient not seen in-person within last 12 months (provide details below)] : Telehealth services are continuing, patient not seen in-person within last 12 months.  [Telehealth (audio & video) - Individual/Group] : This visit was provided via telehealth using real-time 2-way audio visual technology. [Other Location: e.g. Home (Enter Location, City,State)___] : The provider was located at [unfilled]. [Home] : The patient, [unfilled], was located at home, [unfilled], at the time of the visit. [Verbal consent obtained from patient/other participant(s)] : Verbal consent for telehealth/telephonic services obtained from patient/other participant(s) [FreeTextEntry4] : 3:04pm [FreeTextEntry5] : 3:57pm [FreeTextEntry3] : access issues, pt preference [Patient] : Patient [FreeTextEntry1] : grief after father's death in October 2023, as well as complex trauma history

## 2024-04-09 ENCOUNTER — APPOINTMENT (OUTPATIENT)
Dept: PSYCHIATRY | Facility: CLINIC | Age: 34
End: 2024-04-09
Payer: COMMERCIAL

## 2024-04-09 DIAGNOSIS — E28.2 POLYCYSTIC OVARIAN SYNDROME: ICD-10-CM

## 2024-04-09 DIAGNOSIS — L40.9 PSORIASIS, UNSPECIFIED: ICD-10-CM

## 2024-04-09 PROCEDURE — G2211 COMPLEX E/M VISIT ADD ON: CPT

## 2024-04-09 PROCEDURE — 99205 OFFICE O/P NEW HI 60 MIN: CPT

## 2024-04-09 RX ORDER — PSYLLIUM SEED
PACKET (EA) ORAL
Refills: 0 | Status: DISCONTINUED | COMMUNITY
End: 2024-04-09

## 2024-04-09 RX ORDER — NORGESTIMATE AND ETHINYL ESTRADIOL 7DAYSX3 LO
0.18/0.215/0.25 KIT ORAL
Refills: 0 | Status: DISCONTINUED | COMMUNITY
Start: 2021-05-13 | End: 2024-04-09

## 2024-04-09 RX ORDER — NALTREXONE HYDROCHLORIDE AND BUPROPION HYDROCHLORIDE 8; 90 MG/1; MG/1
8-90 TABLET, EXTENDED RELEASE ORAL
Qty: 120 | Refills: 1 | Status: DISCONTINUED | COMMUNITY
Start: 2023-03-03 | End: 2024-04-09

## 2024-04-09 RX ORDER — NYSTATIN AND TRIAMCINOLONE ACETONIDE 100000; 1 MG/G; MG/G
100000-0.1 CREAM TOPICAL 3 TIMES DAILY
Qty: 1 | Refills: 5 | Status: DISCONTINUED | COMMUNITY
Start: 2023-08-02 | End: 2024-04-09

## 2024-04-09 RX ORDER — DOXYCLYCLINE HYCLATE 150 MG/1
TABLET, COATED ORAL
Refills: 0 | Status: ACTIVE | COMMUNITY

## 2024-04-09 NOTE — PAST MEDICAL HISTORY
[FreeTextEntry1] : PCP: Reyes, John Anthony  OTC medications: denies Hx of TBI: denies Hx of Seizures: denies Hx of Migraine HA: caffeine-related migraine    Relevant Labs: Reviewed. EKG: none in chart

## 2024-04-09 NOTE — FAMILY HISTORY
[FreeTextEntry1] :  Psychiatric family history: said parents exhibited symptoms of bipolar disorder, schizophrenia at times but never diagnosed, was unclear due to severe substance use

## 2024-04-09 NOTE — PHYSICAL EXAM
[None] : none [Average] : average [Cooperative] : cooperative [Full] : full [Clear] : clear [Linear/Goal Directed] : linear/goal directed [WNL] : within normal limits [FreeTextEntry8] : "struggling"

## 2024-04-09 NOTE — DISCUSSION/SUMMARY
[Low acute suicide risk] : Low acute suicide risk [Not clinically indicated] : Safety Plan completed/updated (for individuals at risk): Not clinically indicated [FreeTextEntry1] : 4/9/24- Start Wellbutrin  mg daily pending surgeon's approval post-op. She will call for appointment 3-4 weeks following the point at which she can start the medication.  [Yes] : Yes [FreeTextEntry2] : see hpi

## 2024-04-09 NOTE — PLAN
[FreeTextEntry4] : 1. PTSD, r/o CHRIS --Start Wellbutrin  mg daily. She is aware this is an off-label use since Wellbutrin is not approved for PTSD, however prefers to take this vs SSRI, since she had been bupropion as a component of Contrave for ~ year and felt significant benefits from it. If tolerated, will increase to 300 mg daily after 3-4 weeks since we know the 180 mg she was getting from Contrave was inadequate dose.   --She will continue weekly psychotherapy @ [facility]  --She will call for follow-up appointment to schedule for 3-4 weeks after she has the okay to start the medication post-operatively. She understands she can call for earlier appointment if needed.    2. Cannabis Use Disorder --Counseled on importance of reducing use, ideally abstaining from all use.   - I have given my usual talk about the risks and benefits of all treatment recommendations including alternatives and the risks and benefits of no treatment at all. Patient had the opportunity to have all questions answered to their satisfaction. They expressed understanding and agreement with the above treatment plan. - Educated patient of importance of remaining abstinent from drugs and alcohol. - Discussed that unpredictable effects including cardiorespiratory collapse can result from the combination of illicit drugs and prescribed medications. Patient verbalized understanding. - Emergency procedures were discussed: pt. educated to call 911 or go to nearest ER for worsening of symptoms/suicidal/homicidal ideation. - Patient given opportunity to ask questions and their questions were answered and they expressed understanding and agreement with above plan.   I spent a total of 60 minutes for today's visit in evaluating and treating the patient as per above, including preparing for patient's appointment (review of prior documents, [facility] ), performing a medically necessary exam/evaluation, communicating/counseling/educating the patient ordering medications, documenting clinical information in the EHR.
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
## 2024-04-09 NOTE — RISK ASSESSMENT
[Clinical Interview] : Clinical Interview [No] : No [TextBox_32] : reported one suicide attempt at age 13 yo, however said that as adult she is aware it was a cry for help, wanted someone to see how much she was suffering, rather than intent to die or end her life. [None in the patient's lifetime] : None in the patient's lifetime [None Known] : none known

## 2024-04-11 ENCOUNTER — APPOINTMENT (OUTPATIENT)
Dept: INTERNAL MEDICINE | Facility: CLINIC | Age: 34
End: 2024-04-11
Payer: COMMERCIAL

## 2024-04-11 VITALS
WEIGHT: 170 LBS | OXYGEN SATURATION: 99 % | HEART RATE: 82 BPM | HEIGHT: 62 IN | TEMPERATURE: 97.6 F | RESPIRATION RATE: 16 BRPM | BODY MASS INDEX: 31.09 KG/M2 | DIASTOLIC BLOOD PRESSURE: 76 MMHG | SYSTOLIC BLOOD PRESSURE: 106 MMHG

## 2024-04-11 DIAGNOSIS — Z01.818 ENCOUNTER FOR OTHER PREPROCEDURAL EXAMINATION: ICD-10-CM

## 2024-04-11 PROBLEM — L70.9 ACNE, UNSPECIFIED: Chronic | Status: ACTIVE | Noted: 2024-04-04

## 2024-04-11 PROBLEM — M79.3 PANNICULITIS, UNSPECIFIED: Chronic | Status: ACTIVE | Noted: 2024-04-04

## 2024-04-11 PROBLEM — G47.00 INSOMNIA, UNSPECIFIED: Chronic | Status: ACTIVE | Noted: 2024-04-04

## 2024-04-11 PROCEDURE — 99214 OFFICE O/P EST MOD 30 MIN: CPT

## 2024-04-11 RX ORDER — CHROMIUM 200 MCG
TABLET ORAL
Refills: 0 | Status: DISCONTINUED | COMMUNITY
End: 2024-04-11

## 2024-04-11 RX ORDER — BIOTIN 10 MG
TABLET ORAL
Refills: 0 | Status: DISCONTINUED | COMMUNITY
End: 2024-04-11

## 2024-04-11 RX ORDER — CEFADROXIL 500 MG/1
500 CAPSULE ORAL TWICE DAILY
Qty: 14 | Refills: 0 | Status: DISCONTINUED | COMMUNITY
Start: 2024-04-09 | End: 2024-04-11

## 2024-04-11 NOTE — PLAN
[FreeTextEntry1] : #Pre-Op Patient to undergo low-risk procedure. RCRI 0. METs > 4. Has tolerated multiple surgeries in the past under general anesthesia. Lab work done at [facility] for cbc and t&s as requested by surgeon. Previous chem panel showing normal electrolytes and kidney function. CXR and EKG not warranted. Stop-bang score 0. No medication adjustments warranted unless as directed by surgeon. NPO per surgery recommendations.   PATIENT IS MEDICALLY OPTIMIZED FOR PROCEDURE

## 2024-04-11 NOTE — HISTORY OF PRESENT ILLNESS
[(Patient denies any chest pain, claudication, dyspnea on exertion, orthopnea, palpitations or syncope)] : Patient denies any chest pain, claudication, dyspnea on exertion, orthopnea, palpitations or syncope [Excellent (>10 METs)] : Excellent (>10 METs) [Aortic Stenosis] : no aortic stenosis [Atrial Fibrillation] : no atrial fibrillation [Coronary Artery Disease] : no coronary artery disease [Recent Myocardial Infarction] : no recent myocardial infarction [Implantable Device/Pacemaker] : no implantable device/pacemaker [Asthma] : no asthma [COPD] : no COPD [Sleep Apnea] : no sleep apnea [Smoker] : not a smoker [Family Member] : no family member with adverse anesthesia reaction/sudden death [Self] : no previous adverse anesthesia reaction [Chronic Anticoagulation] : no chronic anticoagulation [Chronic Kidney Disease] : no chronic kidney disease [Diabetes] : no diabetes [FreeTextEntry1] : Panniculectomy [FreeTextEntry2] : 04/15/2024 [FreeTextEntry3] : Dr. Shikowitz-Behr [FreeTextEntry4] : 35 y/o F here for surgical pre-op optimization.   Patient is going for Panniculectomy on 04/15/24. She has surgical hx of cholecystectomy due to biliary colic due to gallstones in 2016 back surgery micro-laminectomy due to herniated disc in Jan 2020, two colorectal surgeries summer 2022 and then Jan 2023 for fistula repair. Did have incident of hives within 24 hrs after most recent surgery in Jan 2023, but took claritin at home and got better. No hx of anaphylaxis.

## 2024-04-12 ENCOUNTER — APPOINTMENT (OUTPATIENT)
Dept: PSYCHIATRY | Facility: CLINIC | Age: 34
End: 2024-04-12
Payer: COMMERCIAL

## 2024-04-12 PROCEDURE — 90837 PSYTX W PT 60 MINUTES: CPT | Mod: 95

## 2024-04-12 NOTE — PHYSICAL EXAM
[Average] : average [Cooperative] : cooperative [Euthymic] : euthymic [Full] : full [Clear] : clear [Linear/Goal Directed] : linear/goal directed [WNL] : within normal limits [de-identified] : appropriately varied by session content

## 2024-04-12 NOTE — PLAN
[FreeTextEntry2] : Adjust to life after father's death and reduce trauma and anxiety symptoms  1) Reduce scores on symptom scales by at least 20% 2) Learn at least 2-3 coping skills [Cognitive Processing Therapy] : Cognitive Processing Therapy  [de-identified] :  PROGRESS TO DATE/UPDATES: Met with Dr. Landon, [id number] psychiatrist, for psychiatry intake. Completed impact statement.  INTERVENTIONS/PLAN: - Psychoeducation provided regarding stuck points. Supported pt in reading impact statement and reflecting on stuck points. Guided pt in beginning to create stuck point log. Reinforced pt's engagement with therapy content, normalized impact and instilled hope. Taught use of ABC worksheet and assisted pt in completing trauma-related example(s). Pt actively participated throughout and demonstrated increased insight into underlying beliefs and patterns. Handouts provided. - For home practice, pt will complete at least one ABC worksheet daily about everyday events and at least 3 related to trauma.  Will RTC after surgery  [Recommended Frequency of Visits: ____] : Recommended frequency of visits: [unfilled] [FreeTextEntry1] :  PLAN: Continue supportive grief counseling with CBT/DBT skills as appropriate. Continue monitoring risk level. Continue CPT. Continue assisting pt with creating stuck point log. Pt will maintain follow-up with psychiatrist.  Of note:  pt has surgery scheduled April 15, 2024 and plans to need about 2 weeks break from CTSRR visits during initial recovery period.

## 2024-04-12 NOTE — REASON FOR VISIT
[Patient preference] : as per patient preference [Access issues (e.g., transportation, impaired mobility, etc.)] : due to patient's access issues [Continuing, patient not seen in-person within last 12 months (provide details below)] : Telehealth services are continuing, patient not seen in-person within last 12 months.  [Telehealth (audio & video) - Individual/Group] : This visit was provided via telehealth using real-time 2-way audio visual technology. [Other Location: e.g. Home (Enter Location, City,State)___] : The provider was located at [unfilled]. [Home] : The patient, [unfilled], was located at home, [unfilled], at the time of the visit. [Verbal consent obtained from patient/other participant(s)] : Verbal consent for telehealth/telephonic services obtained from patient/other participant(s) [FreeTextEntry4] : 3:03pm [FreeTextEntry5] : 4:13pm [FreeTextEntry3] : access issues, pt preference [Patient] : Patient [FreeTextEntry1] : complex trauma history

## 2024-04-14 ENCOUNTER — TRANSCRIPTION ENCOUNTER (OUTPATIENT)
Age: 34
End: 2024-04-14

## 2024-04-15 ENCOUNTER — INPATIENT (INPATIENT)
Facility: HOSPITAL | Age: 34
LOS: 0 days | Discharge: HOME | DRG: 572 | End: 2024-04-16
Attending: SURGERY | Admitting: SURGERY
Payer: COMMERCIAL

## 2024-04-15 ENCOUNTER — APPOINTMENT (OUTPATIENT)
Dept: PLASTIC SURGERY | Facility: HOSPITAL | Age: 34
End: 2024-04-15

## 2024-04-15 VITALS — HEIGHT: 62 IN | WEIGHT: 171.08 LBS

## 2024-04-15 DIAGNOSIS — K63.2 FISTULA OF INTESTINE: Chronic | ICD-10-CM

## 2024-04-15 DIAGNOSIS — Z98.890 OTHER SPECIFIED POSTPROCEDURAL STATES: Chronic | ICD-10-CM

## 2024-04-15 DIAGNOSIS — R63.4 ABNORMAL WEIGHT LOSS: ICD-10-CM

## 2024-04-15 DIAGNOSIS — Z90.49 ACQUIRED ABSENCE OF OTHER SPECIFIED PARTS OF DIGESTIVE TRACT: Chronic | ICD-10-CM

## 2024-04-15 LAB — ABO RH CONFIRMATION: SIGNIFICANT CHANGE UP

## 2024-04-15 PROCEDURE — 15830 EXC EXCESSIVE SKIN ABDOMEN: CPT

## 2024-04-15 PROCEDURE — 14302 TIS TRNFR ADDL 30 SQ CM: CPT | Mod: 59

## 2024-04-15 PROCEDURE — 14301 TIS TRNFR ANY 30.1-60 SQ CM: CPT | Mod: 59

## 2024-04-15 DEVICE — ARISTA 3GR: Type: IMPLANTABLE DEVICE | Status: FUNCTIONAL

## 2024-04-15 DEVICE — CLIP APPLIER ETHICON LIGACLIP 11.5" MEDIUM: Type: IMPLANTABLE DEVICE | Status: FUNCTIONAL

## 2024-04-15 RX ORDER — SODIUM CHLORIDE 9 MG/ML
1000 INJECTION, SOLUTION INTRAVENOUS
Refills: 0 | Status: DISCONTINUED | OUTPATIENT
Start: 2024-04-15 | End: 2024-04-16

## 2024-04-15 RX ORDER — HYDROMORPHONE HYDROCHLORIDE 2 MG/ML
0.5 INJECTION INTRAMUSCULAR; INTRAVENOUS; SUBCUTANEOUS
Refills: 0 | Status: DISCONTINUED | OUTPATIENT
Start: 2024-04-15 | End: 2024-04-15

## 2024-04-15 RX ORDER — ONDANSETRON 8 MG/1
4 TABLET, FILM COATED ORAL ONCE
Refills: 0 | Status: COMPLETED | OUTPATIENT
Start: 2024-04-15 | End: 2024-04-15

## 2024-04-15 RX ORDER — NORGESTIMATE AND ETHINYL ESTRADIOL 7DAYSX3 LO
1 KIT ORAL
Refills: 0 | DISCHARGE

## 2024-04-15 RX ORDER — CEFAZOLIN SODIUM 1 G
1000 VIAL (EA) INJECTION EVERY 8 HOURS
Refills: 0 | Status: COMPLETED | OUTPATIENT
Start: 2024-04-15 | End: 2024-04-16

## 2024-04-15 RX ORDER — LORATADINE 10 MG/1
1 TABLET ORAL
Refills: 0 | DISCHARGE

## 2024-04-15 RX ORDER — SODIUM CHLORIDE 9 MG/ML
1000 INJECTION, SOLUTION INTRAVENOUS
Refills: 0 | Status: DISCONTINUED | OUTPATIENT
Start: 2024-04-15 | End: 2024-04-15

## 2024-04-15 RX ORDER — ACETAMINOPHEN 500 MG
650 TABLET ORAL EVERY 6 HOURS
Refills: 0 | Status: DISCONTINUED | OUTPATIENT
Start: 2024-04-15 | End: 2024-04-16

## 2024-04-15 RX ORDER — SENNA PLUS 8.6 MG/1
2 TABLET ORAL AT BEDTIME
Refills: 0 | Status: DISCONTINUED | OUTPATIENT
Start: 2024-04-15 | End: 2024-04-16

## 2024-04-15 RX ORDER — OXYCODONE HYDROCHLORIDE 5 MG/1
10 TABLET ORAL EVERY 4 HOURS
Refills: 0 | Status: DISCONTINUED | OUTPATIENT
Start: 2024-04-15 | End: 2024-04-16

## 2024-04-15 RX ORDER — HYDROMORPHONE HYDROCHLORIDE 2 MG/ML
1 INJECTION INTRAMUSCULAR; INTRAVENOUS; SUBCUTANEOUS
Refills: 0 | Status: DISCONTINUED | OUTPATIENT
Start: 2024-04-15 | End: 2024-04-15

## 2024-04-15 RX ORDER — OXYCODONE HYDROCHLORIDE 5 MG/1
5 TABLET ORAL EVERY 4 HOURS
Refills: 0 | Status: DISCONTINUED | OUTPATIENT
Start: 2024-04-15 | End: 2024-04-16

## 2024-04-15 RX ORDER — LANOLIN ALCOHOL/MO/W.PET/CERES
1 CREAM (GRAM) TOPICAL
Refills: 0 | DISCHARGE

## 2024-04-15 RX ORDER — ONDANSETRON 8 MG/1
4 TABLET, FILM COATED ORAL EVERY 6 HOURS
Refills: 0 | Status: DISCONTINUED | OUTPATIENT
Start: 2024-04-15 | End: 2024-04-16

## 2024-04-15 RX ORDER — NORGESTIMATE AND ETHINYL ESTRADIOL 7DAYSX3 LO
1 KIT ORAL
Qty: 0 | Refills: 0 | DISCHARGE

## 2024-04-15 RX ORDER — ADAPALENE 3 MG/G
1 GEL TOPICAL
Refills: 0 | DISCHARGE

## 2024-04-15 RX ADMIN — OXYCODONE HYDROCHLORIDE 10 MILLIGRAM(S): 5 TABLET ORAL at 18:00

## 2024-04-15 RX ADMIN — OXYCODONE HYDROCHLORIDE 10 MILLIGRAM(S): 5 TABLET ORAL at 22:02

## 2024-04-15 RX ADMIN — HYDROMORPHONE HYDROCHLORIDE 0.5 MILLIGRAM(S): 2 INJECTION INTRAMUSCULAR; INTRAVENOUS; SUBCUTANEOUS at 16:25

## 2024-04-15 RX ADMIN — HYDROMORPHONE HYDROCHLORIDE 0.5 MILLIGRAM(S): 2 INJECTION INTRAMUSCULAR; INTRAVENOUS; SUBCUTANEOUS at 16:10

## 2024-04-15 RX ADMIN — SENNA PLUS 2 TABLET(S): 8.6 TABLET ORAL at 21:42

## 2024-04-15 RX ADMIN — Medication 100 MILLIGRAM(S): at 21:47

## 2024-04-15 RX ADMIN — HYDROMORPHONE HYDROCHLORIDE 0.5 MILLIGRAM(S): 2 INJECTION INTRAMUSCULAR; INTRAVENOUS; SUBCUTANEOUS at 16:34

## 2024-04-15 RX ADMIN — OXYCODONE HYDROCHLORIDE 10 MILLIGRAM(S): 5 TABLET ORAL at 22:32

## 2024-04-15 RX ADMIN — SODIUM CHLORIDE 60 MILLILITER(S): 9 INJECTION, SOLUTION INTRAVENOUS at 17:38

## 2024-04-15 RX ADMIN — OXYCODONE HYDROCHLORIDE 10 MILLIGRAM(S): 5 TABLET ORAL at 19:00

## 2024-04-15 RX ADMIN — HYDROMORPHONE HYDROCHLORIDE 0.5 MILLIGRAM(S): 2 INJECTION INTRAMUSCULAR; INTRAVENOUS; SUBCUTANEOUS at 16:44

## 2024-04-15 RX ADMIN — SODIUM CHLORIDE 60 MILLILITER(S): 9 INJECTION, SOLUTION INTRAVENOUS at 21:48

## 2024-04-15 RX ADMIN — ONDANSETRON 4 MILLIGRAM(S): 8 TABLET, FILM COATED ORAL at 16:38

## 2024-04-15 NOTE — BRIEF OPERATIVE NOTE - NSICDXBRIEFPREOP_GEN_ALL_CORE_FT
PRE-OP DIAGNOSIS:  Abdominal pannus 15-Apr-2024 15:47:31  Angeles White  History of weight loss 15-Apr-2024 15:47:39  Angeles White

## 2024-04-15 NOTE — BRIEF OPERATIVE NOTE - NSICDXBRIEFPOSTOP_GEN_ALL_CORE_FT
POST-OP DIAGNOSIS:  Abdominal pannus 15-Apr-2024 15:47:49  Angeles White  History of weight loss 15-Apr-2024 15:48:01  Angeles White

## 2024-04-15 NOTE — PATIENT PROFILE ADULT - NSTRANSFERBELONGINGSDISPO_GEN_A_NUR
home meds, cell phone, backpack  given to patients boyfriend. patients clothes left on unit/given to family/with patient

## 2024-04-15 NOTE — PRE-ANESTHESIA EVALUATION ADULT - NS MD HP INPLANTS MED DEV
Helena Brown    Thank you for coming to the [facility] today for a cardiac evaluation  It was my pleasure to see you today  A good contact for any questions would be Yvette Deleon  RN @ [phone]    Pacemaker evaluation is excellent  Battery should  last 11 years  22% atrial pacing  100% biventricular resynchronization pacing  No arrhythmias, no ventricular tachycardia identified  Reviewed your medicines which are-gabapentin  Continue with a good exercise program  Follow-up in 3 months; obtain echocardiogram prior to that visit  We will compare the ejection fraction to the echocardiogram-44% prior to the resynchronization    None

## 2024-04-15 NOTE — PRE-ANESTHESIA EVALUATION ADULT - NSANTHADDINFOFT_GEN_ALL_CORE
Discussed GA with ETT in detail with patient and all questions sought and answered. Also discussed need for secondary/barrier method of contraception for one month with perioperative usage of aprepitant. Pt. agrees all plans and wishes to proceed with surgical care.

## 2024-04-16 ENCOUNTER — TRANSCRIPTION ENCOUNTER (OUTPATIENT)
Age: 34
End: 2024-04-16

## 2024-04-16 VITALS
DIASTOLIC BLOOD PRESSURE: 67 MMHG | RESPIRATION RATE: 16 BRPM | SYSTOLIC BLOOD PRESSURE: 108 MMHG | OXYGEN SATURATION: 98 % | HEART RATE: 76 BPM | TEMPERATURE: 99 F

## 2024-04-16 PROCEDURE — C1889: CPT

## 2024-04-16 PROCEDURE — 36415 COLL VENOUS BLD VENIPUNCTURE: CPT

## 2024-04-16 PROCEDURE — C9399: CPT

## 2024-04-16 RX ORDER — DIAZEPAM 5 MG
5 TABLET ORAL EVERY 8 HOURS
Refills: 0 | Status: DISCONTINUED | OUTPATIENT
Start: 2024-04-16 | End: 2024-04-16

## 2024-04-16 RX ADMIN — OXYCODONE HYDROCHLORIDE 10 MILLIGRAM(S): 5 TABLET ORAL at 03:08

## 2024-04-16 RX ADMIN — Medication 650 MILLIGRAM(S): at 13:11

## 2024-04-16 RX ADMIN — Medication 100 MILLIGRAM(S): at 05:04

## 2024-04-16 RX ADMIN — Medication 650 MILLIGRAM(S): at 12:41

## 2024-04-16 RX ADMIN — OXYCODONE HYDROCHLORIDE 10 MILLIGRAM(S): 5 TABLET ORAL at 16:04

## 2024-04-16 RX ADMIN — OXYCODONE HYDROCHLORIDE 10 MILLIGRAM(S): 5 TABLET ORAL at 08:52

## 2024-04-16 RX ADMIN — OXYCODONE HYDROCHLORIDE 10 MILLIGRAM(S): 5 TABLET ORAL at 16:34

## 2024-04-16 RX ADMIN — OXYCODONE HYDROCHLORIDE 10 MILLIGRAM(S): 5 TABLET ORAL at 08:22

## 2024-04-16 NOTE — DISCHARGE NOTE PROVIDER - CARE PROVIDER_API CALL
Shikowitz-Behr, Lauren [facility]  Plastic Surgery  [address] [place] [address], [address]  [place] [address]  Phone: [phone]  Fax: [phone]  Follow Up Time: 1 week

## 2024-04-16 NOTE — DISCHARGE NOTE PROVIDER - NSDCFUSCHEDAPPT_GEN_ALL_CORE_FT
[facility]  PLASTICSUR [address]  Scheduled Appointment: 04/19/2024    Shikowitz-Behr, Lauren B  [facility]  PLASTICSUR [address] [facility]  Scheduled Appointment: 04/23/2024    [facility]  PSYCHIATRY [address]  Scheduled Appointment: 05/03/2024    Rosario Landon  [facility]  PSYCHIATRY [address] [facility]   Scheduled Appointment: 05/09/2024    [facility]  PSYCHIATRY [address]  Scheduled Appointment: 05/10/2024    [facility]  PSYCHIATRY [address]  Scheduled Appointment: 05/17/2024    [facility]  PSYCHIATRY [address]  Scheduled Appointment: 05/24/2024    [facility]  PSYCHIATRY [address]  Scheduled Appointment: 05/31/2024     Shikowitz-Behr, Lauren B  [facility]  PLASTICSUR [address] [facility]  Scheduled Appointment: 04/23/2024    [facility]  PSYCHIATRY [address]  Scheduled Appointment: 05/03/2024    Rosario Landon  [facility]  PSYCHIATRY [address] [facility]   Scheduled Appointment: 05/09/2024    [facility]  PSYCHIATRY [address]  Scheduled Appointment: 05/10/2024    [facility]  PSYCHIATRY [address]  Scheduled Appointment: 05/17/2024    [facility]  PSYCHIATRY [address]  Scheduled Appointment: 05/24/2024    [facility]  PSYCHIATRY [address]  Scheduled Appointment: 05/31/2024

## 2024-04-16 NOTE — DISCHARGE NOTE NURSING/CASE MANAGEMENT/SOCIAL WORK - PATIENT PORTAL LINK FT
You can access the FollowMyHealth Patient Portal offered by [facility] by registering at the following website: [email or web address]. By joining [employer]’s FollowMyHealth portal, you will also be able to view your health information using other applications (apps) compatible with our system.

## 2024-04-16 NOTE — DISCHARGE NOTE PROVIDER - HOSPITAL COURSE
33y/o female with medical h/o Insomnia, reports Panniculitis unspecified s/p weight loss, pt s/p Abdominal Panniculectomy on 4/15/2024, this morning pt comfortable. Admits to pain on lower abdomen incision site. denies chest pain, sob,n,v.

## 2024-04-16 NOTE — PROGRESS NOTE ADULT - ASSESSMENT
A/P: 34y s/p monica hackett abdominoplasty / panniculectomy. Recovering on floor.     - continue abdominal binder  - monitor / care for MELBA drains  - diet as tolerated   - Valium 5mg PO PRN q8h for anxiety / muscle spasms  - continue pain regimen  - ambulate as tolerated   - dispo: plan for discharge home later today   - seen and examined with Dr. Miner  - plan d/w Dr. Alfonso-Behr Joseph T. Tarr, MD, PhD  Plastic Surgery Resident, PGY5  Available on [employer] Teams    PRS Team Pagers:  [facility]: [phone]  [facility]: [phone]  GEN: [id number]  GC: [id number]

## 2024-04-16 NOTE — PROGRESS NOTE ADULT - ATTENDING COMMENTS
The patient is doing well.  The abdomen soft. Incisions intact.  Drain output serosang.  Umbilicus and skin viable. No evidence of infection or necrosis.   No evidence of hematoma.  Plan:  encourage OOB   encourage water  encourage incentive spirometer

## 2024-04-16 NOTE — DISCHARGE NOTE NURSING/CASE MANAGEMENT/SOCIAL WORK - NSDCPEFALRISK_GEN_ALL_CORE
For information on Fall & Injury Prevention, visit: [email or web address] OR  [email or web address] OR  https://www.cdc.gov/steadi/patient.html Quality 226: Preventive Care And Screening: Tobacco Use: Screening And Cessation Intervention: Patient screened for tobacco use and is an ex/non-smoker Detail Level: Detailed

## 2024-04-16 NOTE — DISCHARGE NOTE PROVIDER - NSDCFUADDINST_GEN_ALL_CORE_FT
continue abdominal binder  monitor / care for MELBA drains  diet as tolerated   Follow up with surgeon as outpatient        continue abdominal binder  Drain care daily, empty drain twice daily and monitor for color and amount  diet as tolerated   Follow up with surgeon as outpatient

## 2024-04-16 NOTE — DISCHARGE NOTE PROVIDER - NSDCMRMEDTOKEN_GEN_ALL_CORE_FT
Claritin 10 mg oral tablet: 1 tab(s) orally once a day  Differin 0.1% topical cream: Apply topically to affected area as needed for to face - acne  doxycycline hyclate 50 mg oral capsule: 1 cap(s) orally once a day  Melatonin 10 mg oral capsule: 1 cap(s) orally once a day  Tri-Lo-Shantel 25 mcg oral tablet: 1 tab(s) orally once a day

## 2024-04-16 NOTE — PROGRESS NOTE ADULT - SUBJECTIVE AND OBJECTIVE BOX
Plastic Surgery Progress Note    Subjective:     Patient seen and examined at bedside. Patient complains of anxiety this morning, No N,V,F,C.     OBJECTIVE:     T(C): 37 (04-16-24 @ 06:09), Max: 37.2 (04-15-24 @ 15:56)  HR: 64 (04-16-24 @ 06:09) (64 - 110)  BP: 113/66 (04-16-24 @ 06:09) (105/67 - 141/93)  RR: 15 (04-16-24 @ 06:09) (12 - 16)  SpO2: 98% (04-16-24 @ 06:09) (97% - 98%)  Wt(kg): --    I&O's Detail    15 Apr 2024 07:01  -  16 Apr 2024 07:00  --------------------------------------------------------  IN:    Lactated Ringers: 75 mL  Total IN: 75 mL    OUT:    Bulb (mL): 59 mL    Bulb (mL): 125 mL  Total OUT: 184 mL    Total NET: -109 mL          PHYSICAL EXAM:    GENERAL: NAD, lying in bed comfortably  HEAD:  Atraumatic, Normocephalic  ENT: Moist mucous membranes  CHEST/LUNG: Unlabored respirations  ABDOMEN: Soft, appropriately tender post surgically, no palpable collections, no drainage from incisions, dermabond tape over incisions, intact, ABD pads and abdominal binder in place. bilateral drains, SS output.   NERVOUS SYSTEM:  Alert & Oriented X3, speech clear.       MEDICATIONS  (STANDING):  lactated ringers. 1000 milliLiter(s) (60 mL/Hr) IV Continuous <Continuous>  senna 2 Tablet(s) Oral at bedtime    MEDICATIONS  (PRN):  acetaminophen     Tablet .. 650 milliGRAM(s) Oral every 6 hours PRN Mild Pain (1 - 3)  ondansetron Injectable 4 milliGRAM(s) IV Push every 6 hours PRN Nausea  oxyCODONE    IR 5 milliGRAM(s) Oral every 4 hours PRN Moderate Pain (4 - 6)  oxyCODONE    IR 10 milliGRAM(s) Oral every 4 hours PRN Severe Pain (7 - 10)      LABS:

## 2024-04-17 ENCOUNTER — TRANSCRIPTION ENCOUNTER (OUTPATIENT)
Age: 34
End: 2024-04-17

## 2024-04-17 ENCOUNTER — NON-APPOINTMENT (OUTPATIENT)
Age: 34
End: 2024-04-17

## 2024-04-19 ENCOUNTER — APPOINTMENT (OUTPATIENT)
Dept: INTERNAL MEDICINE | Facility: CLINIC | Age: 34
End: 2024-04-19
Payer: COMMERCIAL

## 2024-04-19 ENCOUNTER — APPOINTMENT (OUTPATIENT)
Dept: PLASTIC SURGERY | Facility: CLINIC | Age: 34
End: 2024-04-19

## 2024-04-19 VITALS
TEMPERATURE: 98.1 F | OXYGEN SATURATION: 99 % | DIASTOLIC BLOOD PRESSURE: 72 MMHG | HEIGHT: 62 IN | SYSTOLIC BLOOD PRESSURE: 98 MMHG | BODY MASS INDEX: 29.08 KG/M2 | WEIGHT: 158 LBS | RESPIRATION RATE: 18 BRPM | HEART RATE: 83 BPM

## 2024-04-19 DIAGNOSIS — Z09 ENCOUNTER FOR FOLLOW-UP EXAMINATION AFTER COMPLETED TREATMENT FOR CONDITIONS OTHER THAN MALIGNANT NEOPLASM: ICD-10-CM

## 2024-04-19 PROCEDURE — 99496 TRANSJ CARE MGMT HIGH F2F 7D: CPT

## 2024-04-23 ENCOUNTER — APPOINTMENT (OUTPATIENT)
Dept: PLASTIC SURGERY | Facility: CLINIC | Age: 34
End: 2024-04-23
Payer: COMMERCIAL

## 2024-04-23 PROCEDURE — 99024 POSTOP FOLLOW-UP VISIT: CPT

## 2024-04-24 NOTE — PHYSICAL EXAM
[de-identified] : NAD AxOx3  [de-identified] : nonlabored breathing  [de-identified] : midline and lower abdominal incisions clean, dry, and intact   umbilicus is viable  prineo dressing in place  expected swelling is present  drains are serosanguinous in output  no evidence of fluid collection, skin breakdown or hematoma  [de-identified] : no edema  [de-identified] : as above [de-identified] : normal affect

## 2024-04-24 NOTE — HISTORY OF PRESENT ILLNESS
[FreeTextEntry1] : DEBORAH HENRY is a 34 year old female who presents today s/p Panniculectomy/Yykwt-xs-[id number] skin excision on 4/15/24. Patient is complaining of some mild discomfort but denies any significant pain. As per patient and significant other, right abdominal drain meets criteria for removal.

## 2024-04-24 NOTE — ADDENDUM
[FreeTextEntry1] :  I, Israel Valdez, documented this note as a scribe on behalf of Dr. Lauren Shikowitz-Behr, MD on 04/23/2024.

## 2024-04-24 NOTE — END OF VISIT
[FreeTextEntry3] : All medical record entries made by the Scribe were at my, Dr. Lauren Shikowitz-Behr, MD, direction and personally dictated by me on 04/23/2024. I have reviewed the chart and agree that the record accurately reflects my personal performance of the history, physical exam, assessment and plan. I have also personally directed, reviewed, and agreed with the chart.

## 2024-04-26 ENCOUNTER — TRANSCRIPTION ENCOUNTER (OUTPATIENT)
Age: 34
End: 2024-04-26

## 2024-04-30 ENCOUNTER — APPOINTMENT (OUTPATIENT)
Dept: PLASTIC SURGERY | Facility: CLINIC | Age: 34
End: 2024-04-30
Payer: COMMERCIAL

## 2024-04-30 PROCEDURE — 99024 POSTOP FOLLOW-UP VISIT: CPT

## 2024-05-01 NOTE — PHYSICAL EXAM
[de-identified] : NAD, AxOx3  [de-identified] : nonlabored breathing  [de-identified] : vertical and lower abdominal incisions clean, dry, and intact  there is some excoriation noted to superior aspect of midline incision, located ary-incisionally prineo dressing in place  umbilicus is viable  drain is serous in output  no evidence of infection, fluid collection, or skin breakdown   [de-identified] : no edema  [de-identified] : as above [de-identified] : normal affect

## 2024-05-01 NOTE — ADDENDUM
[FreeTextEntry1] :  I, Israel Valdez, documented this note as a scribe on behalf of Dr. Lauren Shikowitz-Behr, MD on 04/30/2024.

## 2024-05-01 NOTE — HISTORY OF PRESENT ILLNESS
[FreeTextEntry1] : DEBORAH HENRY is a 34 year old female who presents today s/p Panniculectomy/Zzsru-yt-Zpj skin excision on 4/15/24. As per patient and significant other, remaining abdominal drain meets criteria for removal. Patient complaining about itching to superior aspect of vertical incision. She attributes this to wearing a bra the other day and developing moisture to the area. She otherwise has no other complaints.

## 2024-05-03 ENCOUNTER — APPOINTMENT (OUTPATIENT)
Dept: PSYCHIATRY | Facility: CLINIC | Age: 34
End: 2024-05-03
Payer: COMMERCIAL

## 2024-05-03 PROCEDURE — 90837 PSYTX W PT 60 MINUTES: CPT | Mod: 95

## 2024-05-08 NOTE — REASON FOR VISIT
[Patient preference] : as per patient preference [Access issues (e.g., transportation, impaired mobility, etc.)] : due to patient's access issues [Continuing, patient not seen in-person within last 12 months (provide details below)] : Telehealth services are continuing, patient not seen in-person within last 12 months.  [Telehealth (audio & video) - Individual/Group] : This visit was provided via telehealth using real-time 2-way audio visual technology. [Other Location: e.g. Home (Enter Location, City,State)___] : The provider was located at [unfilled]. [Home] : The patient, [unfilled], was located at home, [unfilled], at the time of the visit. [Verbal consent obtained from patient/other participant(s)] : Verbal consent for telehealth/telephonic services obtained from patient/other participant(s) [FreeTextEntry4] : 3:04pm [FreeTextEntry5] : 4:00pm [FreeTextEntry3] : access issues, pt preference [Patient] : Patient [FreeTextEntry1] : complex trauma history

## 2024-05-08 NOTE — PHYSICAL EXAM
[Average] : average [Cooperative] : cooperative [Labile] : labile [Clear] : clear [Linear/Goal Directed] : linear/goal directed [WNL] : within normal limits [FreeTextEntry8] : variable

## 2024-05-08 NOTE — PLAN
[FreeTextEntry2] : Adjust to life after father's death and reduce trauma and anxiety symptoms  1) Reduce scores on symptom scales by at least 20% 2) Learn at least 2-3 coping skills [Cognitive Processing Therapy] : Cognitive Processing Therapy  [de-identified] :  PROGRESS TO DATE/UPDATES: Last CTSRR visit on 4/12/24. Pt currently recovering at home from surgery. Pt' report emotions have been up and down but she feels more able to cope because can identify her automatic thoughts and emotions then generate balanced alternative thoughts Identified additional stuck points and increased insight regarding her emotions  INTERVENTIONS/PLAN: - Helped pt continue developing her stuck point log. Reinforced pt's engagement with therapy content. Reviewed ABC worksheet and assisted pt in completing trauma-related example(s). Handouts provided. - For home practice, pt will complete at least one ABC worksheet daily related to trauma.  [Recommended Frequency of Visits: ____] : Recommended frequency of visits: [unfilled] [FreeTextEntry1] : BH PLAN: Continue supportive grief counseling with CBT/DBT skills as appropriate. Continue monitoring risk level. Continue CPT.  Pt will maintain follow-up with psychiatrist.

## 2024-05-09 ENCOUNTER — APPOINTMENT (OUTPATIENT)
Dept: PSYCHIATRY | Facility: CLINIC | Age: 34
End: 2024-05-09
Payer: COMMERCIAL

## 2024-05-09 PROCEDURE — 90833 PSYTX W PT W E/M 30 MIN: CPT | Mod: 95

## 2024-05-09 PROCEDURE — 99214 OFFICE O/P EST MOD 30 MIN: CPT | Mod: 95

## 2024-05-09 PROCEDURE — G2211 COMPLEX E/M VISIT ADD ON: CPT | Mod: 95

## 2024-05-09 RX ORDER — CEFADROXIL 500 MG/1
500 CAPSULE ORAL TWICE DAILY
Qty: 14 | Refills: 0 | Status: DISCONTINUED | COMMUNITY
Start: 2024-04-16 | End: 2024-05-09

## 2024-05-09 RX ORDER — OXYCODONE 5 MG/1
5 TABLET ORAL
Qty: 18 | Refills: 0 | Status: DISCONTINUED | COMMUNITY
Start: 2024-04-09 | End: 2024-05-09

## 2024-05-09 RX ORDER — OXYCODONE 5 MG/1
5 TABLET ORAL
Qty: 12 | Refills: 0 | Status: DISCONTINUED | COMMUNITY
Start: 2024-04-26 | End: 2024-05-09

## 2024-05-09 RX ORDER — OXYCODONE 5 MG/1
5 TABLET ORAL
Qty: 12 | Refills: 0 | Status: DISCONTINUED | COMMUNITY
Start: 2024-04-22 | End: 2024-05-09

## 2024-05-09 RX ORDER — ONDANSETRON 4 MG/1
4 TABLET, ORALLY DISINTEGRATING ORAL
Qty: 9 | Refills: 0 | Status: DISCONTINUED | COMMUNITY
Start: 2024-04-09 | End: 2024-05-09

## 2024-05-09 NOTE — REASON FOR VISIT
[Patient preference] : as per patient preference [Starting, patient seen in-person within last 6 months] : Telehealth services are being started as patient has seen in-person within last 6 months. [Telehealth (audio & video) - Individual/Group] : This visit was provided via telehealth using real-time 2-way audio visual technology. [Medical Office: ([facility])___] : The provider was located at the medical office in [unfilled]. [Home] : The patient, [unfilled], was located at home, [unfilled], at the time of the visit. [Verbal consent obtained from patient/other participant(s)] : Verbal consent for telehealth/telephonic services obtained from patient/other participant(s) [FreeTextEntry4] : 11:00 [FreeTextEntry5] : 11:15 [FreeTextEntry2] : 4/9/24 [FreeTextEntry1] : follow-up psych med management

## 2024-05-09 NOTE — PHYSICAL EXAM
[None] : none [Average] : average [Cooperative] : cooperative [Full] : full [Clear] : clear [Linear/Goal Directed] : linear/goal directed [WNL] : within normal limits [Depressed] : depressed [Anxious] : anxious [de-identified] : tearful at times

## 2024-05-09 NOTE — HISTORY OF PRESENT ILLNESS
[FreeTextEntry1] : Refer to intake documentation from 4/9/24 for details.   [FreeTextEntry2] : Refer to intake documentation from 4/9/24 for details.   [FreeTextEntry3] : Current Psych Meds: -Wellbutrin XL   Past Psych Meds: Contrave fluoxetine aripiprazole topiramate quetiapine   PDMP reviewed, ref #   [id number] Prescriptions monthly for medical marijuana rx by Yohan Parson MD

## 2024-05-09 NOTE — PAST MEDICAL HISTORY
[FreeTextEntry1] : Reviewed.  PCP: Reyes, John Anthony  OTC medications: denies Hx of TBI: denies Hx of Seizures: denies Hx of Migraine HA: caffeine-related migraine    Relevant Labs: Reviewed. EKG: none in chart

## 2024-05-09 NOTE — DISCUSSION/SUMMARY
[Low acute suicide risk] : Low acute suicide risk [Yes] : Yes [Not clinically indicated] : Safety Plan completed/updated (for individuals at risk): Not clinically indicated [FreeTextEntry1] : 4/9/24- Start Wellbutrin  mg daily pending surgeon's approval post-op. She will call for appointment 3-4 weeks following the point at which she can start the medication.  5/7/24- Increase Wellbutrin XL to 300 mg daily. Continue psychotherapy with current provider. [FreeTextEntry2] : see hpi

## 2024-05-09 NOTE — PLAN
[FreeTextEntry4] : Psychotherapy documentation:  Time spent for Psychotherapy: 20 minutes Modality: Supportive psychotherapy and psychoeducation and CBT Goal: Reduction in symptoms of depression and anxiety. Improve coping skills, particularly while she has post-op restrictions that limit her from her usual coping methods.   Focus of session: 1. Discussed with the patient their daily activities and coping skills that help elevate their mood and the behaviors and symptoms that interfere with emotional regulation. Discussed how she is feeling amidst post-op recovery. 2. Psychoeducation about medications, risk vs benefits of medications, role of behavior activation and coping skills to help in improving their mood and anxiety symptoms and daily functioning.  [FreeTextEntry5] : 1. PTSD, r/o CHRIS --Increase Wellbutrin XL from 150 mg to 300 mg daily. She is aware this is an off-label use since Wellbutrin is not approved for PTSD, however prefers to take this vs SSRI, since she has taken bupropion as a component of Contrave for ~ year and felt significant benefits from it.  --She will continue weekly psychotherapy @ [facility]  --Follow-up in one month or sooner PRN   2. Cannabis Use Disorder --Counseled on importance of reducing use, ideally abstaining from all use.   - I have given my usual talk about the risks and benefits of all treatment recommendations including alternatives and the risks and benefits of no treatment at all. Patient had the opportunity to have all questions answered to their satisfaction. They expressed understanding and agreement with the above treatment plan. - Educated patient of importance of remaining abstinent from drugs and alcohol. - Discussed that unpredictable effects including cardiorespiratory collapse can result from the combination of illicit drugs and prescribed medications. Patient verbalized understanding. - Emergency procedures were discussed: pt. educated to call 911 or go to nearest ER for worsening of symptoms/suicidal/homicidal ideation. - Patient given opportunity to ask questions and their questions were answered and they expressed understanding and agreement with above plan.

## 2024-05-10 ENCOUNTER — APPOINTMENT (OUTPATIENT)
Dept: PSYCHIATRY | Facility: CLINIC | Age: 34
End: 2024-05-10
Payer: COMMERCIAL

## 2024-05-10 ENCOUNTER — APPOINTMENT (OUTPATIENT)
Dept: PLASTIC SURGERY | Facility: CLINIC | Age: 34
End: 2024-05-10
Payer: COMMERCIAL

## 2024-05-10 PROCEDURE — 90837 PSYTX W PT 60 MINUTES: CPT | Mod: 95

## 2024-05-10 PROCEDURE — 99024 POSTOP FOLLOW-UP VISIT: CPT

## 2024-05-10 NOTE — HISTORY OF PRESENT ILLNESS
[FreeTextEntry1] : DEBORAH HENRY is a 34 year old female who presents today s/p Panniculectomy/Myfsr-vu-Vqg skin excision on 4/15/24. Patient with no complaints. She reports tenderness at the upper aspect of the incision. She feels well and is pleased with results.

## 2024-05-10 NOTE — PHYSICAL EXAM
[Average] : average [Cooperative] : cooperative [Anxious] : anxious [Irritable] : irritable [Labile] : labile [Clear] : clear [Linear/Goal Directed] : linear/goal directed [WNL] : within normal limits

## 2024-05-10 NOTE — REASON FOR VISIT
[Patient preference] : as per patient preference [Access issues (e.g., transportation, impaired mobility, etc.)] : due to patient's access issues [Continuing, patient not seen in-person within last 12 months (provide details below)] : Telehealth services are continuing, patient not seen in-person within last 12 months.  [Telehealth (audio & video) - Individual/Group] : This visit was provided via telehealth using real-time 2-way audio visual technology. [Other Location: e.g. Home (Enter Location, City,State)___] : The provider was located at [unfilled]. [Home] : The patient, [unfilled], was located at home, [unfilled], at the time of the visit. [Verbal consent obtained from patient/other participant(s)] : Verbal consent for telehealth/telephonic services obtained from patient/other participant(s) [FreeTextEntry4] : 3:01pm [FreeTextEntry5] : 4:00pm [FreeTextEntry3] : access issues, pt preference [Patient] : Patient [FreeTextEntry1] : complex trauma history

## 2024-05-10 NOTE — ADDENDUM
[FreeTextEntry1] :  I, Israel Valdez, documented this note as a scribe on behalf of Dr. Lauren Shikowitz-Behr, MD on 05/10/2024.

## 2024-05-10 NOTE — PLAN
[FreeTextEntry2] : Adjust to life after father's death and reduce trauma and anxiety symptoms  1) Reduce scores on symptom scales by at least 20% 2) Learn at least 2-3 coping skills [Cognitive Processing Therapy] : Cognitive Processing Therapy  [de-identified] :  PROGRESS TO DATE/UPDATES: Has been fighting with boyfriend.  Increased anger, frustration, anxiety, and self-doubt.  Denies feeling hopeless or worthlessness.  Misses being able to exercise and go to work. Completed daily ABC Worksheets to process her reactions to recent events. Continues f/u with psychiatrist for medication management. Took cannabis edibles yesterday.  INTERVENTIONS/PLAN: Validated pt's emotions; reflected pt's expressed values.   Supported pt's review of completed ABC Worksheets and provided corrective feedback on distinction between thoughts and emotions. Reviewed natural vs. manufactured emotions.  Discussed levels of responsibility and feelings of guilt or regret. Reviewed pt's cannabis use, and discussed clinical rationale against using substances prior to or during therapy appointments and therapy assignments. Pt expressed good understanding and agreed.  For homework, pt will complete at least 1 ABC Worksheet about trauma-related beliefs and also about daily events if desired. [Recommended Frequency of Visits: ____] : Recommended frequency of visits: [unfilled] [FreeTextEntry1] :  PLAN: Continue CPT.  Continue supportive grief counseling with CBT/DBT skills as appropriate. Continue monitoring risk level. Pt will maintain follow-up with psychiatrist.

## 2024-05-10 NOTE — END OF VISIT
[FreeTextEntry3] : All medical record entries made by the Scribe were at my, Dr. Lauren Shikowitz-Behr, MD, direction and personally dictated by me on 05/10/2024. I have reviewed the chart and agree that the record accurately reflects my personal performance of the history, physical exam, assessment and plan. I have also personally directed, reviewed, and agreed with the chart.

## 2024-05-10 NOTE — PHYSICAL EXAM
[NI] : Normal [de-identified] : NAD, AxOx3  [de-identified] : nonlabored breathing  [de-identified] : midline and lower abdominal incision clean, dry, and intact  healing well  no evidence of infection, fluid collection, or skin breakdown  [de-identified] : no edema  [de-identified] : normal affect

## 2024-05-17 ENCOUNTER — APPOINTMENT (OUTPATIENT)
Dept: PSYCHIATRY | Facility: CLINIC | Age: 34
End: 2024-05-17
Payer: COMMERCIAL

## 2024-05-17 PROCEDURE — 90837 PSYTX W PT 60 MINUTES: CPT | Mod: 95

## 2024-05-17 NOTE — REASON FOR VISIT
[Patient preference] : as per patient preference [Access issues (e.g., transportation, impaired mobility, etc.)] : due to patient's access issues [Continuing, patient not seen in-person within last 12 months (provide details below)] : Telehealth services are continuing, patient not seen in-person within last 12 months.  [Telehealth (audio & video) - Individual/Group] : This visit was provided via telehealth using real-time 2-way audio visual technology. [Other Location: e.g. Home (Enter Location, City,State)___] : The provider was located at [unfilled]. [Home] : The patient, [unfilled], was located at home, [unfilled], at the time of the visit. [Verbal consent obtained from patient/other participant(s)] : Verbal consent for telehealth/telephonic services obtained from patient/other participant(s) [FreeTextEntry4] : 3:08pm [FreeTextEntry5] : 4:12pm [Patient] : Patient [FreeTextEntry3] : access issues, pt preference [FreeTextEntry1] : complex trauma history

## 2024-05-17 NOTE — PLAN
[FreeTextEntry2] : Adjust to life after father's death and reduce trauma and anxiety symptoms  1) Reduce scores on symptom scales by at least 20% 2) Learn at least 2-3 coping skills [Cognitive Processing Therapy] : Cognitive Processing Therapy  [de-identified] :  PROGRESS TO DATE/UPDATES: Pt shared urge to not attend session today.   Completed assigned ABC Worksheets on daily events; did not complete trauma-related worksheets due to avoidance.   INTERVENTIONS/PLAN: Collaborative discussion regarding role of avoidance in trauma and helped pt explore urge not to attend therapy visit. Helped pt review completed worksheets and reviewed "Identifying Emotions Handout."  Guided pt in completing ABC Worksheets for assimilated beliefs. Supported pt in identifying additional assimilated beliefs, for which pt will complete ABC Worksheets for home practice.  [Recommended Frequency of Visits: ____] : Recommended frequency of visits: [unfilled] [FreeTextEntry1] :  PLAN: Continue CPT.  Continue supportive grief counseling with CBT/DBT skills as appropriate. Continue monitoring risk level. Pt will maintain follow-up with psychiatrist.

## 2024-05-24 ENCOUNTER — APPOINTMENT (OUTPATIENT)
Dept: PSYCHIATRY | Facility: CLINIC | Age: 34
End: 2024-05-24
Payer: COMMERCIAL

## 2024-05-24 DIAGNOSIS — F43.21 ADJUSTMENT DISORDER WITH DEPRESSED MOOD: ICD-10-CM

## 2024-05-24 PROCEDURE — 90837 PSYTX W PT 60 MINUTES: CPT | Mod: 95

## 2024-05-30 PROBLEM — F43.21 GRIEF: Status: ACTIVE | Noted: 2023-12-01

## 2024-05-30 NOTE — REASON FOR VISIT
[Patient preference] : as per patient preference [Access issues (e.g., transportation, impaired mobility, etc.)] : due to patient's access issues [Continuing, patient not seen in-person within last 12 months (provide details below)] : Telehealth services are continuing, patient not seen in-person within last 12 months.  [Telehealth (audio & video) - Individual/Group] : This visit was provided via telehealth using real-time 2-way audio visual technology. [Other Location: e.g. Home (Enter Location, City,State)___] : The provider was located at [unfilled]. [Home] : The patient, [unfilled], was located at home, [unfilled], at the time of the visit. [Verbal consent obtained from patient/other participant(s)] : Verbal consent for telehealth/telephonic services obtained from patient/other participant(s) [FreeTextEntry4] : 3:03pm [FreeTextEntry5] : 4:05pm [FreeTextEntry3] : access issues, pt preference [Patient] : Patient [FreeTextEntry1] : complex trauma history

## 2024-05-30 NOTE — PLAN
[FreeTextEntry2] : Adjust to life after father's death and reduce trauma and anxiety symptoms  1) Reduce scores on symptom scales by at least 20% 2) Learn at least 2-3 coping skills [Cognitive Processing Therapy] : Cognitive Processing Therapy  [de-identified] :  PROGRESS TO DATE/UPDATES: Has been cleaning out dad's house, received help from boyfriend's family. Will return to work next week Completed 12 ABC Worksheets on assimilated beliefs  INTERVENTIONS/PLAN:  - Socratic dialogue to review completed ABC Worksheets and reinforce pt self-reflection and processing of stuck points. Helped pt distinguish among the concepts of having no role in the trauma, some responsibility, or blame. Discussed (in)appropriateness of emotions.  Introduced Challenging Questions Worksheet and used one of pt's assimilated stuck points to illustrate how to complete the worksheet and confront maladaptive self-talk and stuck points. - For home practice, pt will complete one Challenging Questions Worksheet per day, all related to assimilated stuck points.  [Recommended Frequency of Visits: ____] : Recommended frequency of visits: [unfilled] [FreeTextEntry1] :  PLAN: Continue CPT.  Continue supportive grief counseling with CBT/DBT skills as appropriate. Continue monitoring risk level. Pt will maintain follow-up with psychiatrist. verbal instruction/written material

## 2024-05-31 ENCOUNTER — APPOINTMENT (OUTPATIENT)
Dept: PSYCHIATRY | Facility: CLINIC | Age: 34
End: 2024-05-31
Payer: COMMERCIAL

## 2024-05-31 PROCEDURE — 90837 PSYTX W PT 60 MINUTES: CPT | Mod: 95

## 2024-06-03 NOTE — REASON FOR VISIT
[Patient preference] : as per patient preference [Access issues (e.g., transportation, impaired mobility, etc.)] : due to patient's access issues [Continuing, patient not seen in-person within last 12 months (provide details below)] : Telehealth services are continuing, patient not seen in-person within last 12 months.  [Telehealth (audio & video) - Individual/Group] : This visit was provided via telehealth using real-time 2-way audio visual technology. [Other Location: e.g. Home (Enter Location, City,State)___] : The provider was located at [unfilled]. [Home] : The patient, [unfilled], was located at home, [unfilled], at the time of the visit. [Verbal consent obtained from patient/other participant(s)] : Verbal consent for telehealth/telephonic services obtained from patient/other participant(s) [FreeTextEntry4] : 3:01pm [FreeTextEntry5] : 4:07pm [FreeTextEntry3] : access issues, pt preference [Patient] : Patient [FreeTextEntry1] : complex trauma history

## 2024-06-03 NOTE — PLAN
[FreeTextEntry2] : Adjust to life after father's death and reduce trauma and anxiety symptoms  1) Reduce scores on symptom scales by at least 20% 2) Learn at least 2-3 coping skills [Cognitive Processing Therapy] : Cognitive Processing Therapy  [de-identified] :  PROGRESS TO DATE/UPDATES: Happy to have returned to work this week Felt anxious when heard her boyfriend's parents yelling, felt guilty for asking them to stop. Used challenging questions to confront the worry that they were angry at her. Completed assigned Challenging Questions Worksheets  INTERVENTIONS/PLAN: Supported pt in completing several challenging questions worksheets in session as new assimilated beliefs became apparent during initial portion of visit. Emerging evidence of pt's beliefs modifying.  For home practice, pt will continue to complete Challenging Questions Worksheets on assimilated stuck points.  [Recommended Frequency of Visits: ____] : Recommended frequency of visits: [unfilled] [FreeTextEntry1] :  PLAN: Continue CPT.  Continue supportive grief counseling with CBT/DBT skills as appropriate. Continue monitoring risk level. Pt will maintain follow-up with psychiatrist.

## 2024-06-07 ENCOUNTER — APPOINTMENT (OUTPATIENT)
Dept: PLASTIC SURGERY | Facility: CLINIC | Age: 34
End: 2024-06-07
Payer: COMMERCIAL

## 2024-06-07 DIAGNOSIS — L98.7 EXCESSIVE AND REDUNDANT SKIN AND SUBCUTANEOUS TISSUE: ICD-10-CM

## 2024-06-07 DIAGNOSIS — M79.3 PANNICULITIS, UNSPECIFIED: ICD-10-CM

## 2024-06-07 DIAGNOSIS — E65 LOCALIZED ADIPOSITY: ICD-10-CM

## 2024-06-07 DIAGNOSIS — R63.4 ABNORMAL WEIGHT LOSS: ICD-10-CM

## 2024-06-07 PROCEDURE — 99024 POSTOP FOLLOW-UP VISIT: CPT

## 2024-06-10 PROBLEM — R63.4 WEIGHT LOSS: Status: ACTIVE | Noted: 2023-10-14

## 2024-06-10 PROBLEM — M79.3 PANNICULITIS: Status: ACTIVE | Noted: 2023-10-14

## 2024-06-10 PROBLEM — L98.7 EXCESSIVE SKIN AND SUBCUTANEOUS TISSUE: Status: ACTIVE | Noted: 2023-08-02

## 2024-06-10 PROBLEM — E65 ABDOMINAL PANNUS: Status: ACTIVE | Noted: 2023-10-14

## 2024-06-10 NOTE — HISTORY OF PRESENT ILLNESS
[FreeTextEntry1] : DEBORAH HENRY is a 34 year old female who presents today s/p Panniculectomy/Gajys-mr-Zdm skin excision on 4/15/24. Patient is complaining about tightness to the superior aspect of the midline incision. Otherwise, no other complaints.

## 2024-06-10 NOTE — ADDENDUM
[FreeTextEntry1] :  I, Israel Valdez, documented this note as a scribe on behalf of Dr. Lauren Shikowitz-Behr, MD on 06/07/2024.

## 2024-06-10 NOTE — PHYSICAL EXAM
[de-identified] : NAD, AxOx3  [de-identified] : nonlabored breathing  [de-identified] : Midline and lower abdominal incision is clean, dry, and intact  Healing well No evidence of infection  Some firmness along the midline incisions, compatible with fat necrosis  Small standing cutaneous deformity, superior aspect of the midline incision  [de-identified] : no edema  [de-identified] : as above  [de-identified] : normal affect

## 2024-06-10 NOTE — END OF VISIT
[FreeTextEntry3] : All medical record entries made by the Scribe were at my, Dr. Lauren Shikowitz-Behr, MD, direction and personally dictated by me on 06/07/2024. I have reviewed the chart and agree that the record accurately reflects my personal performance of the history, physical exam, assessment and plan. I have also personally directed, reviewed, and agreed with the chart.

## 2024-06-13 ENCOUNTER — APPOINTMENT (OUTPATIENT)
Dept: PSYCHIATRY | Facility: CLINIC | Age: 34
End: 2024-06-13
Payer: COMMERCIAL

## 2024-06-13 DIAGNOSIS — F12.90 CANNABIS USE, UNSPECIFIED, UNCOMPLICATED: ICD-10-CM

## 2024-06-13 PROCEDURE — 99214 OFFICE O/P EST MOD 30 MIN: CPT | Mod: 95

## 2024-06-13 PROCEDURE — G2211 COMPLEX E/M VISIT ADD ON: CPT | Mod: 95

## 2024-06-13 RX ORDER — BUPROPION HYDROCHLORIDE 300 MG/1
300 TABLET, EXTENDED RELEASE ORAL DAILY
Qty: 90 | Refills: 0 | Status: ACTIVE | COMMUNITY
Start: 2024-04-09 | End: 1900-01-01

## 2024-06-13 NOTE — PLAN
[FreeTextEntry4] : Psychotherapy documentation:  Time spent for Psychotherapy: 20 minutes Modality: Supportive psychotherapy and psychoeducation and CBT Goal: Reduction in symptoms of depression and anxiety. Improve coping skills, particularly while she has post-op restrictions that limit her from her usual coping methods.   Focus of session: 1. Discussed with the patient their daily activities and coping skills that help elevate their mood and the behaviors and symptoms that interfere with emotional regulation. Discussed how she is feeling amidst post-op recovery. 2. Psychoeducation about medications, risk vs benefits of medications, role of behavior activation and coping skills to help in improving their mood and anxiety symptoms and daily functioning.  [FreeTextEntry5] : 1. PTSD, r/o CHRIS --Continue Wellbutrin  mg daily. Refilled today for 90 day supply. She is aware this is an off-label use since Wellbutrin is not approved for PTSD, however prefers to take this vs SSRI, since she has taken bupropion as a component of Contrave for ~ year and felt significant benefits from it.  --She will continue weekly psychotherapy @ [facility]  --Follow-up in two months or sooner PRN   2. Cannabis Use Disorder --Counseled on importance of reducing use, ideally abstaining from all use.   - I have given my usual talk about the risks and benefits of all treatment recommendations including alternatives and the risks and benefits of no treatment at all. Patient had the opportunity to have all questions answered to their satisfaction. They expressed understanding and agreement with the above treatment plan. - Educated patient of importance of remaining abstinent from drugs and alcohol. - Discussed that unpredictable effects including cardiorespiratory collapse can result from the combination of illicit drugs and prescribed medications. Patient verbalized understanding. - Emergency procedures were discussed: pt. educated to call 911 or go to nearest ER for worsening of symptoms/suicidal/homicidal ideation. - Patient given opportunity to ask questions and their questions were answered and they expressed understanding and agreement with above plan.

## 2024-06-13 NOTE — PHYSICAL EXAM
[None] : none [Average] : average [Cooperative] : cooperative [Depressed] : depressed [Anxious] : anxious [Clear] : clear [Linear/Goal Directed] : linear/goal directed [WNL] : within normal limits [Full] : full

## 2024-06-13 NOTE — PAST MEDICAL HISTORY
[FreeTextEntry1] : Reviewed.  PCP: Reyes, John Anthony  Hx of TBI: denies Hx of Seizures: denies Hx of Migraine HA: caffeine-related migraine    Relevant Labs: Reviewed. EKG: none in chart

## 2024-06-13 NOTE — HISTORY OF PRESENT ILLNESS
[FreeTextEntry1] : Refer to intake documentation from 4/9/24 for details.   [FreeTextEntry2] : Refer to intake documentation from 4/9/24 for details.   [FreeTextEntry3] : Current Psych Meds: -Wellbutrin XL   Past Psych Meds: Contrave fluoxetine aripiprazole topiramate quetiapine   PDMP reviewed, ref #   [id number] Prescriptions monthly for medical marijuana rx by Yohan Parson MD

## 2024-06-13 NOTE — DISCUSSION/SUMMARY
[Low acute suicide risk] : Low acute suicide risk [Yes] : Yes [Not clinically indicated] : Safety Plan completed/updated (for individuals at risk): Not clinically indicated [FreeTextEntry1] : 4/9/24- Start Wellbutrin  mg daily pending surgeon's approval post-op. She will call for appointment 3-4 weeks following the point at which she can start the medication.  5/7/24- Increase Wellbutrin XL to 300 mg daily. Continue psychotherapy with current provider. 6/13/24- Objectively improved w/ re: mood. Continue Wellbutrin XL at current dose. Continue therapy w/ [facility]. F/u 2 months. [FreeTextEntry2] : see hpi

## 2024-06-13 NOTE — REASON FOR VISIT
[Patient preference] : as per patient preference [Starting, patient seen in-person within last 6 months] : Telehealth services are being started as patient has seen in-person within last 6 months. [Telehealth (audio & video) - Individual/Group] : This visit was provided via telehealth using real-time 2-way audio visual technology. [Medical Office: ([facility])___] : The provider was located at the medical office in [unfilled]. [Home] : The patient, [unfilled], was located at home, [unfilled], at the time of the visit. [Verbal consent obtained from patient/other participant(s)] : Verbal consent for telehealth/telephonic services obtained from patient/other participant(s) [FreeTextEntry4] : 11:00 [FreeTextEntry5] : 11:15 [FreeTextEntry2] : 4/9/24 [FreeTextEntry1] : follow-up psych med management

## 2024-06-19 ENCOUNTER — NON-APPOINTMENT (OUTPATIENT)
Age: 34
End: 2024-06-19

## 2024-06-21 ENCOUNTER — APPOINTMENT (OUTPATIENT)
Dept: PSYCHIATRY | Facility: CLINIC | Age: 34
End: 2024-06-21
Payer: COMMERCIAL

## 2024-06-21 PROCEDURE — 90837 PSYTX W PT 60 MINUTES: CPT | Mod: 95

## 2024-06-26 ENCOUNTER — APPOINTMENT (OUTPATIENT)
Dept: PSYCHIATRY | Facility: CLINIC | Age: 34
End: 2024-06-26
Payer: COMMERCIAL

## 2024-06-26 DIAGNOSIS — F43.10 POST-TRAUMATIC STRESS DISORDER, UNSPECIFIED: ICD-10-CM

## 2024-06-26 PROCEDURE — 90837 PSYTX W PT 60 MINUTES: CPT | Mod: 95

## 2024-07-01 PROBLEM — F43.10 POST-TRAUMATIC STRESS DISORDER: Status: ACTIVE | Noted: 2024-03-26

## 2024-07-05 ENCOUNTER — APPOINTMENT (OUTPATIENT)
Dept: PSYCHIATRY | Facility: CLINIC | Age: 34
End: 2024-07-05
Payer: COMMERCIAL

## 2024-07-05 PROCEDURE — 90837 PSYTX W PT 60 MINUTES: CPT | Mod: 95

## 2024-07-09 ENCOUNTER — APPOINTMENT (OUTPATIENT)
Dept: OBGYN | Facility: CLINIC | Age: 34
End: 2024-07-09
Payer: COMMERCIAL

## 2024-07-09 VITALS
DIASTOLIC BLOOD PRESSURE: 70 MMHG | WEIGHT: 158 LBS | OXYGEN SATURATION: 99 % | HEIGHT: 62 IN | SYSTOLIC BLOOD PRESSURE: 104 MMHG | HEART RATE: 60 BPM | TEMPERATURE: 98 F | BODY MASS INDEX: 29.08 KG/M2

## 2024-07-09 DIAGNOSIS — Z01.419 ENCOUNTER FOR GYNECOLOGICAL EXAMINATION (GENERAL) (ROUTINE) W/OUT ABNORMAL FINDINGS: ICD-10-CM

## 2024-07-09 PROCEDURE — 99395 PREV VISIT EST AGE 18-39: CPT

## 2024-07-11 LAB — HPV HIGH+LOW RISK DNA PNL CVX: NOT DETECTED

## 2024-07-12 ENCOUNTER — APPOINTMENT (OUTPATIENT)
Dept: PSYCHIATRY | Facility: CLINIC | Age: 34
End: 2024-07-12
Payer: COMMERCIAL

## 2024-07-12 PROCEDURE — 90837 PSYTX W PT 60 MINUTES: CPT | Mod: 95

## 2024-07-15 ENCOUNTER — APPOINTMENT (OUTPATIENT)
Dept: PSYCHIATRY | Facility: CLINIC | Age: 34
End: 2024-07-15
Payer: COMMERCIAL

## 2024-07-15 DIAGNOSIS — F43.21 ADJUSTMENT DISORDER WITH DEPRESSED MOOD: ICD-10-CM

## 2024-07-15 PROCEDURE — 90837 PSYTX W PT 60 MINUTES: CPT | Mod: 95

## 2024-07-17 ENCOUNTER — APPOINTMENT (OUTPATIENT)
Dept: PSYCHIATRY | Facility: CLINIC | Age: 34
End: 2024-07-17

## 2024-07-23 ENCOUNTER — APPOINTMENT (OUTPATIENT)
Dept: PSYCHIATRY | Facility: CLINIC | Age: 34
End: 2024-07-23
Payer: COMMERCIAL

## 2024-07-23 PROCEDURE — 90837 PSYTX W PT 60 MINUTES: CPT | Mod: 95

## 2024-07-24 ENCOUNTER — NON-APPOINTMENT (OUTPATIENT)
Age: 34
End: 2024-07-24

## 2024-07-26 ENCOUNTER — APPOINTMENT (OUTPATIENT)
Dept: PSYCHIATRY | Facility: CLINIC | Age: 34
End: 2024-07-26

## 2024-07-26 PROCEDURE — 90837 PSYTX W PT 60 MINUTES: CPT | Mod: 95
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
## 2024-07-26 NOTE — REASON FOR VISIT
[Patient preference] : as per patient preference [Access issues (e.g., transportation, impaired mobility, etc.)] : due to patient's access issues [Continuing, patient not seen in-person within last 12 months (provide details below)] : Telehealth services are continuing, patient not seen in-person within last 12 months.  [Telehealth (audio & video) - Individual/Group] : This visit was provided via telehealth using real-time 2-way audio visual technology. [Other Location: e.g. Home (Enter Location, City,State)___] : The provider was located at [unfilled]. [Home] : The patient, [unfilled], was located at home, [unfilled], at the time of the visit. [Verbal consent obtained from patient/other participant(s)] : Verbal consent for telehealth/telephonic services obtained from patient/other participant(s) [FreeTextEntry4] : 4:01pm [FreeTextEntry5] : 5:00pm [FreeTextEntry3] : access issues, pt preference [Patient] : Patient [FreeTextEntry1] : complex trauma history

## 2024-07-26 NOTE — DISCUSSION/SUMMARY
[FreeTextEntry1] : Pt submitted PCL-5, PHQ-9, and CHRIS-7.  PCL-5 total score: 42, decreased from last administration PHQ-9 score: 13, similar to last administration CHRIS-7 score: 16, similar but slightly decreased from last administration

## 2024-07-29 ENCOUNTER — APPOINTMENT (OUTPATIENT)
Dept: PSYCHIATRY | Facility: CLINIC | Age: 34
End: 2024-07-29

## 2024-08-01 ENCOUNTER — APPOINTMENT (OUTPATIENT)
Dept: PSYCHIATRY | Facility: CLINIC | Age: 34
End: 2024-08-01
Payer: COMMERCIAL

## 2024-08-01 DIAGNOSIS — F43.21 ADJUSTMENT DISORDER WITH DEPRESSED MOOD: ICD-10-CM

## 2024-08-01 DIAGNOSIS — F41.9 ANXIETY DISORDER, UNSPECIFIED: ICD-10-CM

## 2024-08-01 PROCEDURE — 90837 PSYTX W PT 60 MINUTES: CPT | Mod: 95

## 2024-08-02 NOTE — REASON FOR VISIT
[Patient preference] : as per patient preference [Access issues (e.g., transportation, impaired mobility, etc.)] : due to patient's access issues [Continuing, patient not seen in-person within last 12 months (provide details below)] : Telehealth services are continuing, patient not seen in-person within last 12 months.  [Telehealth (audio & video) - Individual/Group] : This visit was provided via telehealth using real-time 2-way audio visual technology. [Other Location: e.g. Home (Enter Location, City,State)___] : The provider was located at [unfilled]. [Home] : The patient, [unfilled], was located at home, [unfilled], at the time of the visit. [Verbal consent obtained from patient/other participant(s)] : Verbal consent for telehealth/telephonic services obtained from patient/other participant(s) [FreeTextEntry4] : 4:00pm [FreeTextEntry5] : 5:08pm [FreeTextEntry3] : access issues, pt preference [Patient] : Patient [FreeTextEntry1] : complex trauma history

## 2024-08-02 NOTE — REASON FOR VISIT
[Patient preference] : as per patient preference [Access issues (e.g., transportation, impaired mobility, etc.)] : due to patient's access issues [Continuing, patient not seen in-person within last 12 months (provide details below)] : Telehealth services are continuing, patient not seen in-person within last 12 months.  [Telehealth (audio & video) - Individual/Group] : This visit was provided via telehealth using real-time 2-way audio visual technology. [Other Location: e.g. Home (Enter Location, City,State)___] : The provider was located at [unfilled]. [Home] : The patient, [unfilled], was located at home, [unfilled], at the time of the visit. [Verbal consent obtained from patient/other participant(s)] : Verbal consent for telehealth/telephonic services obtained from patient/other participant(s) [FreeTextEntry4] : 4:02pm [FreeTextEntry5] : 5:18pm [FreeTextEntry3] : access issues, pt preference [Patient] : Patient [FreeTextEntry1] : complex trauma history
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
## 2024-08-02 NOTE — PHYSICAL EXAM
[Average] : average [Cooperative] : cooperative [Anxious] : anxious [Full] : full [Linear/Goal Directed] : linear/goal directed [Clear] : clear [WNL] : within normal limits

## 2024-08-02 NOTE — ADDENDUM
[FreeTextEntry1] : PCL-5 (weekly) total score;  42, above cutoff criterion.  (decreased from last administration on 2/27/24 when total score was 51).

## 2024-08-06 ENCOUNTER — NON-APPOINTMENT (OUTPATIENT)
Age: 34
End: 2024-08-06

## 2024-08-06 PROBLEM — Z80.3 FAMILY HISTORY OF MALIGNANT NEOPLASM OF BREAST: Status: ACTIVE | Noted: 2024-08-06

## 2024-08-06 PROBLEM — L02.91 ABSCESS: Status: RESOLVED | Noted: 2024-08-06 | Resolved: 2024-08-06

## 2024-08-06 PROBLEM — L30.4 INTERTRIGO: Status: ACTIVE | Noted: 2024-08-06

## 2024-08-06 PROBLEM — L70.9 ACNE: Status: ACTIVE | Noted: 2024-08-06

## 2024-08-06 PROBLEM — L50.9 URTICARIA, UNSPECIFIED: Status: ACTIVE | Noted: 2024-08-06

## 2024-08-06 PROBLEM — L21.9 SEBORRHEIC DERMATITIS: Status: ACTIVE | Noted: 2024-08-06

## 2024-08-07 ENCOUNTER — APPOINTMENT (OUTPATIENT)
Dept: PSYCHIATRY | Facility: CLINIC | Age: 34
End: 2024-08-07

## 2024-08-07 PROCEDURE — 90837 PSYTX W PT 60 MINUTES: CPT | Mod: 95

## 2024-08-09 ENCOUNTER — APPOINTMENT (OUTPATIENT)
Dept: PSYCHIATRY | Facility: CLINIC | Age: 34
End: 2024-08-09

## 2024-08-09 PROCEDURE — 90837 PSYTX W PT 60 MINUTES: CPT | Mod: 95
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
## 2024-08-09 NOTE — REASON FOR VISIT
[Patient preference] : as per patient preference [Access issues (e.g., transportation, impaired mobility, etc.)] : due to patient's access issues [Continuing, patient not seen in-person within last 12 months (provide details below)] : Telehealth services are continuing, patient not seen in-person within last 12 months.  [Telehealth (audio & video) - Individual/Group] : This visit was provided via telehealth using real-time 2-way audio visual technology. [Other Location: e.g. Home (Enter Location, City,State)___] : The provider was located at [unfilled]. [Home] : The patient, [unfilled], was located at home, [unfilled], at the time of the visit. [Verbal consent obtained from patient/other participant(s)] : Verbal consent for telehealth/telephonic services obtained from patient/other participant(s) [FreeTextEntry4] : 4:02pm [FreeTextEntry5] : 5:23pm [FreeTextEntry3] : access issues, pt preference [Patient] : Patient [FreeTextEntry1] : complex trauma history

## 2024-08-09 NOTE — PLAN
[FreeTextEntry2] : Adjust to life after father's death; and reduce complex trauma and anxiety symptoms  1) Reduce scores on symptom scales by at least 20% 2) Learn at least 2-3 coping skills [Cognitive Processing Therapy] : Cognitive Processing Therapy  [de-identified] : PROGRESS TO DATE/UPDATES: - Some daily life stressors as well as reconnected with a cousin. Emerging success in coping with unexpected situations, as well as spontaneously confronting unhelpful automatic thoughts - Did not complete assigned Challenging Beliefs Worksheet about general events or safety-related stuck points. Pt reflected that she feels more "accomplished" when she completes worksheets in session or reviews them with clinician support. When completing them independently, she feels overwhelmed and "stuck"  INTERVENTIONS/PLAN: - Socratic dialogue regarding role of avoidance in trauma. Assisted pt in exploring and identifying potential stuck points and core beliefs that may be interfering with homework completion. Pt acknowledged beliefs related to doing things "right" in order to feel accomplishing or demonstrate her intelligence.  - For home practice, pt will complete 1 Challenging Beliefs Worksheet about perfectionist belief and 1 worksheet about a safety-related stuck point.  Will RTC in 2 days as scheduled. [Recommended Frequency of Visits: ____] : Recommended frequency of visits: [unfilled] [FreeTextEntry1] :  PLAN: Continue CPT.  Continue supportive grief counseling with CBT/DBT skills as appropriate. Continue monitoring risk level. Pt will maintain follow-up with psychiatrist.

## 2024-08-09 NOTE — REASON FOR VISIT
[Patient preference] : as per patient preference [Access issues (e.g., transportation, impaired mobility, etc.)] : due to patient's access issues [Continuing, patient not seen in-person within last 12 months (provide details below)] : Telehealth services are continuing, patient not seen in-person within last 12 months.  [Telehealth (audio & video) - Individual/Group] : This visit was provided via telehealth using real-time 2-way audio visual technology. [Other Location: e.g. Home (Enter Location, City,State)___] : The provider was located at [unfilled]. [Home] : The patient, [unfilled], was located at home, [unfilled], at the time of the visit. [Verbal consent obtained from patient/other participant(s)] : Verbal consent for telehealth/telephonic services obtained from patient/other participant(s) [FreeTextEntry4] : 4:05pm [FreeTextEntry5] : 5:06pm [FreeTextEntry3] : access issues, pt preference [Patient] : Patient [FreeTextEntry1] : complex trauma history

## 2024-08-09 NOTE — PHYSICAL EXAM
[Average] : average [Cooperative] : cooperative [Anxious] : anxious [Labile] : labile [Clear] : clear [Linear/Goal Directed] : linear/goal directed [WNL] : within normal limits [de-identified] : varied by session, tearful at times

## 2024-08-12 ENCOUNTER — APPOINTMENT (OUTPATIENT)
Dept: PSYCHIATRY | Facility: CLINIC | Age: 34
End: 2024-08-12
Payer: COMMERCIAL

## 2024-08-12 DIAGNOSIS — F41.9 ANXIETY DISORDER, UNSPECIFIED: ICD-10-CM

## 2024-08-12 DIAGNOSIS — F43.21 ADJUSTMENT DISORDER WITH DEPRESSED MOOD: ICD-10-CM

## 2024-08-12 PROCEDURE — 90837 PSYTX W PT 60 MINUTES: CPT | Mod: 95

## 2024-08-13 ENCOUNTER — APPOINTMENT (OUTPATIENT)
Dept: PSYCHIATRY | Facility: CLINIC | Age: 34
End: 2024-08-13
Payer: COMMERCIAL

## 2024-08-13 DIAGNOSIS — F12.90 CANNABIS USE, UNSPECIFIED, UNCOMPLICATED: ICD-10-CM

## 2024-08-13 PROCEDURE — G2211 COMPLEX E/M VISIT ADD ON: CPT | Mod: 95

## 2024-08-13 PROCEDURE — 99214 OFFICE O/P EST MOD 30 MIN: CPT | Mod: 95

## 2024-08-13 NOTE — PLAN
[FreeTextEntry4] : Psychotherapy documentation:  Time spent for Psychotherapy: 20 minutes Modality: Supportive psychotherapy and psychoeducation and CBT Goal: Reduction in symptoms of depression and anxiety. Improve coping skills, particularly while she has post-op restrictions that limit her from her usual coping methods.   Focus of session: 1. Discussed with the patient their daily activities and coping skills that help elevate their mood and the behaviors and symptoms that interfere with emotional regulation. Discussed how she is feeling amidst post-op recovery. 2. Psychoeducation about medications, risk vs benefits of medications, role of behavior activation and coping skills to help in improving their mood and anxiety symptoms and daily functioning.  [FreeTextEntry5] : 1. PTSD, r/o CHRIS --Continue Wellbutrin  mg daily. She is aware this is an off-label use since Wellbutrin is not approved for PTSD, however prefers to take this vs SSRI, since she has taken bupropion as a component of Contrave for ~ year and felt significant benefits from it.  --She will continue weekly psychotherapy @ [facility]  --Follow-up in three months or sooner PRN   2. Cannabis Use Disorder --Counseled on importance of reducing use, ideally abstaining from all use.   - I have given my usual talk about the risks and benefits of all treatment recommendations including alternatives and the risks and benefits of no treatment at all. Patient had the opportunity to have all questions answered to their satisfaction. They expressed understanding and agreement with the above treatment plan. - Educated patient of importance of remaining abstinent from drugs and alcohol. - Discussed that unpredictable effects including cardiorespiratory collapse can result from the combination of illicit drugs and prescribed medications. Patient verbalized understanding. - Emergency procedures were discussed: pt. educated to call 911 or go to nearest ER for worsening of symptoms/suicidal/homicidal ideation. - Patient given opportunity to ask questions and their questions were answered and they expressed understanding and agreement with above plan.

## 2024-08-13 NOTE — PHYSICAL EXAM
[Average] : average [Cooperative] : cooperative [Clear] : clear [Linear/Goal Directed] : linear/goal directed [WNL] : within normal limits [Euthymic] : euthymic [Full] : full [None] : none [None Reported] : none reported

## 2024-08-13 NOTE — HISTORY OF PRESENT ILLNESS
[FreeTextEntry1] : Refer to intake documentation from 4/9/24 for details.   [FreeTextEntry2] : Refer to intake documentation from 4/9/24 for details.   [FreeTextEntry3] : Current Psych Meds: -Wellbutrin XL   Past Psych Meds: Contrave fluoxetine aripiprazole topiramate quetiapine   PDMP reviewed, ref #   [id number] Prescriptions monthly for medical marijuana rx by Yohan Parson MD

## 2024-08-13 NOTE — REASON FOR VISIT
[Patient preference] : as per patient preference [Continuing, patient seen in-person within last 12 months] : Telehealth services are continuing as patient has been seen in-person within last 12 months. [Telehealth (audio & video) - Individual/Group] : This visit was provided via telehealth using real-time 2-way audio visual technology. [Medical Office: ([facility])___] : The provider was located at the medical office in [unfilled]. [Home] : The patient, [unfilled], was located at home, [unfilled], at the time of the visit. [Verbal consent obtained from patient/other participant(s)] : Verbal consent for telehealth/telephonic services obtained from patient/other participant(s) [FreeTextEntry4] : 15:30 [FreeTextEntry5] : 15:45 [FreeTextEntry2] : 4/9/24 [FreeTextEntry1] : follow-up psych med management

## 2024-08-13 NOTE — DISCUSSION/SUMMARY
[Low acute suicide risk] : Low acute suicide risk [Yes] : Yes [Not clinically indicated] : Safety Plan completed/updated (for individuals at risk): Not clinically indicated [FreeTextEntry1] : 4/9/24- Start Wellbutrin  mg daily pending surgeon's approval post-op. She will call for appointment 3-4 weeks following the point at which she can start the medication.  5/7/24- Increase Wellbutrin XL to 300 mg daily. Continue psychotherapy with current provider. 8/13/24- Continue Wellbutrin  mg daily. Continue psychotherapy. F/u 3 months.  [FreeTextEntry2] : see hpi

## 2024-08-14 NOTE — PHYSICAL EXAM
[Average] : average [Cooperative] : cooperative [Euthymic] : euthymic [Full] : full [Clear] : clear [Linear/Goal Directed] : linear/goal directed [WNL] : within normal limits [de-identified] : varied by session content

## 2024-08-14 NOTE — PLAN
[FreeTextEntry2] : Adjust to life after father's death; and reduce complex trauma and anxiety symptoms  1) Reduce scores on symptom scales by at least 20% 2) Learn at least 2-3 coping skills [Cognitive Processing Therapy] : Cognitive Processing Therapy  [de-identified] :  PROGRESS TO DATE/UPDATES: Enjoyed her weekend trip with boyfriend. Stressful day trying to catch up on work and personal tasks. 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 Completed assigned homework (identify additional stuck points from recent writing; 1 safety-related Challenging Beliefs Worksheet).  INTERVENTIONS/PLAN: - Collaboratively reflected recent successes and progress. - Socratic dialogue to support pt's review of completed worksheet and stuck point log, and reinforce pt's confrontation of maladaptive self-talk and stuck points - For home practice, pt will continue to complete 1 Challenging Beliefs Worksheet per day on safety-related stuck points. [Recommended Frequency of Visits: ____] : Recommended frequency of visits: [unfilled] [FreeTextEntry1] :  PLAN: Continue CPT.  Continue supportive grief counseling with CBT/DBT skills as appropriate. Continue monitoring risk level. Pt will maintain follow-up with psychiatrist.

## 2024-08-14 NOTE — REASON FOR VISIT
[Patient preference] : as per patient preference [Access issues (e.g., transportation, impaired mobility, etc.)] : due to patient's access issues [Continuing, patient not seen in-person within last 12 months (provide details below)] : Telehealth services are continuing, patient not seen in-person within last 12 months.  [Telehealth (audio & video) - Individual/Group] : This visit was provided via telehealth using real-time 2-way audio visual technology. [Other Location: e.g. Home (Enter Location, City,State)___] : The provider was located at [unfilled]. [Home] : The patient, [unfilled], was located at home, [unfilled], at the time of the visit. [Verbal consent obtained from patient/other participant(s)] : Verbal consent for telehealth/telephonic services obtained from patient/other participant(s) [FreeTextEntry4] : 4:02pm [FreeTextEntry5] : 5:04pm [FreeTextEntry3] : access issues, pt preference [Patient] : Patient [FreeTextEntry1] : complex trauma history

## 2024-08-15 ENCOUNTER — APPOINTMENT (OUTPATIENT)
Dept: PSYCHIATRY | Facility: CLINIC | Age: 34
End: 2024-08-15
Payer: COMMERCIAL

## 2024-08-15 PROCEDURE — 90837 PSYTX W PT 60 MINUTES: CPT | Mod: 95

## 2024-08-19 NOTE — PLAN
[FreeTextEntry2] : Adjust to life after father's death; and reduce complex trauma and anxiety symptoms  1) Reduce scores on symptom scales by at least 20% 2) Learn at least 2-3 coping skills [Cognitive Processing Therapy] : Cognitive Processing Therapy  [de-identified] : PROGRESS TO DATE/UPDATES: Had a difficult work day and used a Challenging Beliefs Worksheet to confront an unhelpful thought about self-worth. Reports it helped her feel better. Also completed worksheets on safety-related stuck points  INTERVENTIONS/PLAN: - Socratic dialogue to review completed Challenging Beliefs Worksheets, reinforcing pt's confrontation of maladaptive self-talk. Pt emotional in session. Space provided for her to feel and discuss her emotions. Engaged in noticing exercise of her distress before and after a 5-senses grounding activity. Pt noticed decreased distress after the activity. - Per pt preference for home practice, pt will continue to complete 1 Challenging Beliefs Worksheet per day on safety-related stuck points. [Recommended Frequency of Visits: ____] : Recommended frequency of visits: [unfilled] [FreeTextEntry1] :  PLAN: Continue CPT.  Continue supportive grief counseling with CBT/DBT skills as appropriate. Continue monitoring risk level. Pt will maintain follow-up with psychiatrist.

## 2024-08-19 NOTE — REASON FOR VISIT
[Patient preference] : as per patient preference [Access issues (e.g., transportation, impaired mobility, etc.)] : due to patient's access issues [Continuing, patient not seen in-person within last 12 months (provide details below)] : Telehealth services are continuing, patient not seen in-person within last 12 months.  [Telehealth (audio & video) - Individual/Group] : This visit was provided via telehealth using real-time 2-way audio visual technology. [Other Location: e.g. Home (Enter Location, City,State)___] : The provider was located at [unfilled]. [Home] : The patient, [unfilled], was located at home, [unfilled], at the time of the visit. [Verbal consent obtained from patient/other participant(s)] : Verbal consent for telehealth/telephonic services obtained from patient/other participant(s) [FreeTextEntry4] : 4:05pm [FreeTextEntry5] : 5:09pm [FreeTextEntry3] : access issues, pt preference [Patient] : Patient [FreeTextEntry1] : complex trauma history

## 2024-08-20 ENCOUNTER — APPOINTMENT (OUTPATIENT)
Dept: PSYCHIATRY | Facility: CLINIC | Age: 34
End: 2024-08-20
Payer: COMMERCIAL

## 2024-08-20 PROCEDURE — 90837 PSYTX W PT 60 MINUTES: CPT | Mod: 95

## 2024-08-22 NOTE — REASON FOR VISIT
[Patient preference] : as per patient preference [Access issues (e.g., transportation, impaired mobility, etc.)] : due to patient's access issues [Continuing, patient not seen in-person within last 12 months (provide details below)] : Telehealth services are continuing, patient not seen in-person within last 12 months.  [Telehealth (audio & video) - Individual/Group] : This visit was provided via telehealth using real-time 2-way audio visual technology. [Other Location: e.g. Home (Enter Location, City,State)___] : The provider was located at [unfilled]. [Home] : The patient, [unfilled], was located at home, [unfilled], at the time of the visit. [Verbal consent obtained from patient/other participant(s)] : Verbal consent for telehealth/telephonic services obtained from patient/other participant(s) [FreeTextEntry4] : 4:04pm [FreeTextEntry5] : 5:05pm [FreeTextEntry3] : access issues, pt preference [Patient] : Patient [FreeTextEntry1] : complex trauma history

## 2024-08-22 NOTE — PHYSICAL EXAM
[Average] : average [Cooperative] : cooperative [Euthymic] : euthymic [Clear] : clear [Linear/Goal Directed] : linear/goal directed [WNL] : within normal limits [de-identified] : appropriately varied

## 2024-08-22 NOTE — PLAN
[FreeTextEntry2] : Adjust to life after father's death; and reduce complex trauma and anxiety symptoms  1) Reduce scores on symptom scales by at least 20% 2) Learn at least 2-3 coping skills [Cognitive Processing Therapy] : Cognitive Processing Therapy  [de-identified] :  PROGRESS TO DATE/UPDATES: -	Has been trying to talk about and face her anxiety more than in the past; pt feels more capable of coping adaptively.  Notices herself catching her negative automatic thoughts and challenging them -	completed several Challenging Beliefs Worksheets on safety-related stuck points  INTERVENTIONS/PLAN: - reflected and reinforced pt's progress in coping with automatic negative thoughts - Socratic dialogue to support pt's review of completed Challenging Beliefs worksheets and reinforce confrontation of safety-related stuck points. - Introduced the Core Theme of Trust, and helped pt identify any existing trust-related Stuck Points on the log. Supported pt in beginning to challenge a stuck point within this theme. - For home practice, pt will complete 1 Challenging Beliefs Worksheet per day, focusing on trust-related stuck points. handouts provided. [Recommended Frequency of Visits: ____] : Recommended frequency of visits: [unfilled] [FreeTextEntry1] :  PLAN: Continue CPT.  Continue supportive grief counseling with CBT/DBT skills as appropriate. Continue monitoring risk level. Pt will maintain follow-up with psychiatrist.

## 2024-08-23 ENCOUNTER — APPOINTMENT (OUTPATIENT)
Dept: PSYCHIATRY | Facility: CLINIC | Age: 34
End: 2024-08-23

## 2024-08-27 ENCOUNTER — APPOINTMENT (OUTPATIENT)
Dept: PSYCHIATRY | Facility: CLINIC | Age: 34
End: 2024-08-27
Payer: COMMERCIAL

## 2024-08-27 PROCEDURE — 90837 PSYTX W PT 60 MINUTES: CPT | Mod: 95

## 2024-08-28 NOTE — REASON FOR VISIT
[Patient preference] : as per patient preference [Access issues (e.g., transportation, impaired mobility, etc.)] : due to patient's access issues [Continuing, patient not seen in-person within last 12 months (provide details below)] : Telehealth services are continuing, patient not seen in-person within last 12 months.  [Telehealth (audio & video) - Individual/Group] : This visit was provided via telehealth using real-time 2-way audio visual technology. [Other Location: e.g. Home (Enter Location, City,State)___] : The provider was located at [unfilled]. [Home] : The patient, [unfilled], was located at home, [unfilled], at the time of the visit. [Verbal consent obtained from patient/other participant(s)] : Verbal consent for telehealth/telephonic services obtained from patient/other participant(s) [FreeTextEntry4] : 4:07pm [FreeTextEntry5] : 5:00pm [FreeTextEntry3] : access issues, pt preference [Patient] : Patient [FreeTextEntry1] : complex trauma history

## 2024-08-28 NOTE — PLAN
[FreeTextEntry2] : Adjust to life after father's death; and reduce complex trauma and anxiety symptoms  1) Reduce scores on symptom scales by at least 20% 2) Learn at least 2-3 coping skills [Cognitive Processing Therapy] : Cognitive Processing Therapy  [de-identified] :  PROGRESS TO DATE/UPDATES: completed Challenging Beliefs worksheets on trust-related stuck points Difficult week across several domains -	Finished cleaning out her father's home. [place] ambushed by boyfriend's family about how to sell the house; completed a Challenging Beliefs Worksheet to process her own cognitive reaction and worry that they were judging her. -	Arguments with boyfriend -	Work project rejected by corporate -	Brother's girlfriend was arrested for IPV -	Saw that Cannabis Use Disorder is in her medical chart  INTERVENTIONS/PLAN: Strengths-based discussion, reflecting on differences in pt's adaptive coping approach in the face of current challenges. Supported pt's review of 1 completed worksheet. Discussed diagnostic criteria for Cannabis Use Disorder and provided space for pt to reflect on how it may or may not apply to her. Pt shared that seeing it in the chart led her to further reflect on her use (particularly in relationship to her parents' substance abuse), contributed to her beginning to reduce use, and motivated her to talk to her providers about it.   Collaboratively agreed to review pt's completed worksheets on trust-related stuck points at next session. She will continue to complete worksheets within this theme for additional home practice.  [Recommended Frequency of Visits: ____] : Recommended frequency of visits: [unfilled] [FreeTextEntry1] :  PLAN: Continue CPT.  Continue supportive grief counseling with CBT/DBT skills as appropriate. Continue monitoring risk level. Pt will maintain follow-up with psychiatrist.

## 2024-08-29 ENCOUNTER — APPOINTMENT (OUTPATIENT)
Dept: PSYCHIATRY | Facility: CLINIC | Age: 34
End: 2024-08-29
Payer: COMMERCIAL

## 2024-08-29 PROCEDURE — 90834 PSYTX W PT 45 MINUTES: CPT | Mod: 95

## 2024-08-30 ENCOUNTER — APPOINTMENT (OUTPATIENT)
Dept: PSYCHIATRY | Facility: CLINIC | Age: 34
End: 2024-08-30

## 2024-09-03 ENCOUNTER — APPOINTMENT (OUTPATIENT)
Dept: PSYCHIATRY | Facility: CLINIC | Age: 34
End: 2024-09-03
Payer: COMMERCIAL

## 2024-09-03 PROCEDURE — 90837 PSYTX W PT 60 MINUTES: CPT | Mod: 95

## 2024-09-04 NOTE — PLAN
[FreeTextEntry2] : Adjust to life after father's death; and reduce complex trauma and anxiety symptoms  1) Reduce scores on symptom scales by at least 20% 2) Learn at least 2-3 coping skills [Cognitive Processing Therapy] : Cognitive Processing Therapy  [Supportive Therapy] : Supportive Therapy [de-identified] :  PROGRESS TO DATE/UPDATES: Feeling anxious, incomplete, and overwhelmed today, in reaction to seeing her  father's house emptied.  INTERVENTIONS/PLAN: - Engaged pt in dialogue around dialectics.  Provided therapeutic space for grief processing. Psychoeducation provided about grief in the face of anniversaries, reminders, or significant events. Normalized her grief reaction. - For home practice, pt decided to complete 1 trust-related Challenging Beliefs Worksheet, as she will be traveling with friends this weekend. [Recommended Frequency of Visits: ____] : Recommended frequency of visits: [unfilled] [FreeTextEntry1] :  PLAN: Continue CPT.  Continue supportive grief counseling with CBT/DBT skills as appropriate. Continue monitoring risk level. Pt will maintain follow-up with psychiatrist.

## 2024-09-04 NOTE — REASON FOR VISIT
[Patient preference] : as per patient preference [Access issues (e.g., transportation, impaired mobility, etc.)] : due to patient's access issues [Continuing, patient not seen in-person within last 12 months (provide details below)] : Telehealth services are continuing, patient not seen in-person within last 12 months.  [Telehealth (audio & video) - Individual/Group] : This visit was provided via telehealth using real-time 2-way audio visual technology. [Medical Office: ([facility])___] : The provider was located at the medical office in [unfilled]. [Home] : The patient, [unfilled], was located at home, [unfilled], at the time of the visit. [Verbal consent obtained from patient/other participant(s)] : Verbal consent for telehealth/telephonic services obtained from patient/other participant(s) [FreeTextEntry4] : 2:06PM [FreeTextEntry5] : 3:06PM [FreeTextEntry3] : access issues, pt preference [Patient] : Patient [FreeTextEntry1] : complex trauma history

## 2024-09-04 NOTE — REASON FOR VISIT
[Patient preference] : as per patient preference [Access issues (e.g., transportation, impaired mobility, etc.)] : due to patient's access issues [Continuing, patient not seen in-person within last 12 months (provide details below)] : Telehealth services are continuing, patient not seen in-person within last 12 months.  [Telehealth (audio & video) - Individual/Group] : This visit was provided via telehealth using real-time 2-way audio visual technology. [Other Location: e.g. Home (Enter Location, City,State)___] : The provider was located at [unfilled]. [Home] : The patient, [unfilled], was located at home, [unfilled], at the time of the visit. [Verbal consent obtained from patient/other participant(s)] : Verbal consent for telehealth/telephonic services obtained from patient/other participant(s) [FreeTextEntry4] : 4:08pm [FreeTextEntry5] : 4:58pm [FreeTextEntry3] : access issues, pt preference [Patient] : Patient [FreeTextEntry1] : complex trauma history

## 2024-09-04 NOTE — PHYSICAL EXAM
[Average] : average [Cooperative] : cooperative [Anxious] : anxious [Clear] : clear [Linear/Goal Directed] : linear/goal directed [WNL] : within normal limits [de-identified] : anxious

## 2024-09-04 NOTE — PLAN
[FreeTextEntry2] : Adjust to life after father's death; and reduce complex trauma and anxiety symptoms  1) Reduce scores on symptom scales by at least 20% 2) Learn at least 2-3 coping skills [Cognitive Processing Therapy] : Cognitive Processing Therapy  [de-identified] :  PROGRESS TO DATE/UPDATES: -	Feeling overwhelmed from weekend friends' trip -	Angry about various situations with boyfriend  -	completed assigned Challenging Beliefs Worksheet  INTERVENTIONS/PLAN: -	reflective listening provided. Encouraged pt to consider pursuing couples counseling to allow her to focus on her treatment goals for individual therapy. Pt agreed and stated she will talk to her boyfriend after he completes an upcoming neuropsych eval for possible ADHD. -	Socratic review of completed trust-related Challenging Beliefs Worksheet. Reviewed concept of "trust star" using general and pt-specific examples. Pt will continue completing Challenging Beliefs Worksheets within this theme for home practice. [Recommended Frequency of Visits: ____] : Recommended frequency of visits: [unfilled] [FreeTextEntry1] :  PLAN: Continue CPT.  Continue supportive grief counseling with CBT/DBT skills as appropriate. Continue monitoring risk level. Pt will maintain follow-up with psychiatrist.

## 2024-09-05 ENCOUNTER — APPOINTMENT (OUTPATIENT)
Dept: BARIATRICS | Facility: CLINIC | Age: 34
End: 2024-09-05
Payer: COMMERCIAL

## 2024-09-05 VITALS
HEART RATE: 67 BPM | SYSTOLIC BLOOD PRESSURE: 111 MMHG | DIASTOLIC BLOOD PRESSURE: 74 MMHG | HEIGHT: 62 IN | OXYGEN SATURATION: 97 % | WEIGHT: 167 LBS | BODY MASS INDEX: 30.73 KG/M2 | TEMPERATURE: 97.3 F

## 2024-09-05 DIAGNOSIS — F43.10 POST-TRAUMATIC STRESS DISORDER, UNSPECIFIED: ICD-10-CM

## 2024-09-05 DIAGNOSIS — E66.9 OBESITY, UNSPECIFIED: ICD-10-CM

## 2024-09-05 DIAGNOSIS — M79.3 PANNICULITIS, UNSPECIFIED: ICD-10-CM

## 2024-09-05 DIAGNOSIS — F43.21 ADJUSTMENT DISORDER WITH DEPRESSED MOOD: ICD-10-CM

## 2024-09-05 DIAGNOSIS — F41.9 ANXIETY DISORDER, UNSPECIFIED: ICD-10-CM

## 2024-09-05 DIAGNOSIS — E55.9 VITAMIN D DEFICIENCY, UNSPECIFIED: ICD-10-CM

## 2024-09-05 DIAGNOSIS — E28.2 POLYCYSTIC OVARIAN SYNDROME: ICD-10-CM

## 2024-09-05 PROCEDURE — G0447 BEHAVIOR COUNSEL OBESITY 15M: CPT | Mod: 59

## 2024-09-05 PROCEDURE — 99213 OFFICE O/P EST LOW 20 MIN: CPT

## 2024-09-05 NOTE — HISTORY OF PRESENT ILLNESS
[FreeTextEntry1] : 33 year old female with PCOS presents with her spouse for follow up of weight gain. She reports that she began to struggle with her weight as a teen. She was her heaviest in high school at 260 lbs. In college she was able to go down to 180 lb with strict diet and exercise. She slowly regained the weight and weight back up to 250 lbs. She started the Keto diet and was able to get down to 225 lbs. In 11/2021 she was started on phentermine and went down to 165 lbs in 7/2022 while being seen in the  weight loss clinic. She has gained 5 lbs since being off of the phentermine.  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 Recently had to have the fistula fixed again as well as a sphincterectomy. This has impacted her eating. She feels nervous to eat outside in public in fear that she will need to go to the bathroom urgently. She also endorses some dietary indiscretion from time to time. She has a very busy schedule (works 6 days a week) and does not always have time to meal prep. She stopped exercising in 2020 due to LBP.  She does suffer from depression/anxiety. She admits to smoking marijuana daily before bed. Does not do it earlier in fear of craving foods and gaining weight.   Since her last visit, she lost her father and was grieving. She does feel that she was not as consistent with her diet as she previously was. She also was not exercising. She does take Contrave 2 tabs qam. She is upset she gained 3 lbs. Feels mood is improving. Denies any SI. HR/BP wnl. BMs are regular denies any constipation. She is scheduled for a panniculectomy in April.   9/5/24 Since last visit, she has weaned off of Contrave prior to surgery. She underwent a panniculectomy/[id number] skin excision on 4/15/24. She feels great since. Reports 7 lbs of skin was removed. She has been cleared to exercise and enjoys going ot the gym most days of the week taking classes. She does feel that she does not always make the best choices at night. She is conscious and still tries to be mindful. Has been working with trauma center, threapist and psychiatrist on her mental health. She would like to lose more weight.

## 2024-09-05 NOTE — ASSESSMENT
[FreeTextEntry1] : Patient with BMI of 31.09 kg/m2. Continue to focus on lifestyle modifications. I have recommended she f/u with the RD for additional education and work on increasing protein intake. At least 15 minutes was spent during the visit on obesity counseling.  Will remain off of medications at this time. Discussed body comp testing to help her understand her body better.    Emotional support provided. All of her questions were answered.  Will have labs sent here from pre-op  f/u with me in 6 months.

## 2024-09-06 ENCOUNTER — APPOINTMENT (OUTPATIENT)
Dept: PLASTIC SURGERY | Facility: CLINIC | Age: 34
End: 2024-09-06

## 2024-09-12 ENCOUNTER — APPOINTMENT (OUTPATIENT)
Dept: PSYCHIATRY | Facility: CLINIC | Age: 34
End: 2024-09-12
Payer: COMMERCIAL

## 2024-09-12 PROCEDURE — 90837 PSYTX W PT 60 MINUTES: CPT | Mod: 95

## 2024-09-16 ENCOUNTER — APPOINTMENT (OUTPATIENT)
Dept: PSYCHIATRY | Facility: CLINIC | Age: 34
End: 2024-09-16
Payer: COMMERCIAL

## 2024-09-16 DIAGNOSIS — F43.21 ADJUSTMENT DISORDER WITH DEPRESSED MOOD: ICD-10-CM

## 2024-09-16 PROCEDURE — 90837 PSYTX W PT 60 MINUTES: CPT | Mod: 95

## 2024-09-17 NOTE — PLAN
[FreeTextEntry2] : Adjust to life after father's death; and reduce complex trauma and anxiety symptoms  1) Reduce scores on symptom scales by at least 20% 2) Learn at least 2-3 coping skills [Cognitive Processing Therapy] : Cognitive Processing Therapy  [de-identified] :  PROGRESS TO DATE/UPDATES: Helped boyfriend attend his neuropsych consultation appointment today. Pt also shared with him her needs within their partnership, even though it was uncomfortable. Preparing father's house to be listed for sale later this week  INTERVENTIONS/PLAN: - Reflected pt's self-advocacy while also being supportive of boyfriend.  Reflected dialectics within recent experiences. - Introduced the Core Theme of Power/Control, and helped pt identify any existing power/control-related Stuck Points on the log. Supported pt in beginning to challenge a stuck point within this theme. - For home practice, pt will complete 1 Challenging Beliefs Worksheet per day, focusing on power/control-related stuck points. Handout provided.  [Recommended Frequency of Visits: ____] : Recommended frequency of visits: [unfilled] [FreeTextEntry1] :  PLAN: Continue CPT.  Continue supportive grief counseling with CBT/DBT skills as appropriate. Continue monitoring risk level. Pt will maintain follow-up with psychiatrist.

## 2024-09-17 NOTE — PLAN
[FreeTextEntry2] : Adjust to life after father's death; and reduce complex trauma and anxiety symptoms  1) Reduce scores on symptom scales by at least 20% 2) Learn at least 2-3 coping skills [Cognitive Processing Therapy] : Cognitive Processing Therapy  [de-identified] :  PROGRESS TO DATE/UPDATES: Did not complete assigned Challenging Beliefs worksheets within power/control theme; reports it was due to busyness of the past few days, including cleaning and listing father's house for sale.  Described mixed emotions about reaching this step with his estate. Pt shared recent examples where she's been able to challenge automatic negative thoughts in day-to-day events and not allow other people's reactions negatively impact her mood.  INTERVENTIONS/PLAN: Reflective listening provided. Reinforced pt's progress in generalizing thought challenging skills to daily situations. Implemented Socratic dialogue to support pt in completing a Challenging Beliefs Worksheet (CBW) on a power/control-related stuck point. For home practice, pt will finish the CBW that was started in session. She will also complete 1 additional CBW on another power/control-related stuck point.  [Recommended Frequency of Visits: ____] : Recommended frequency of visits: [unfilled] [FreeTextEntry1] :  PLAN: Continue CPT.  Continue supportive grief counseling with CBT/DBT skills as appropriate. Continue monitoring risk level. Pt will maintain follow-up with psychiatrist.

## 2024-09-17 NOTE — REASON FOR VISIT
[Patient preference] : as per patient preference [Access issues (e.g., transportation, impaired mobility, etc.)] : due to patient's access issues [Continuing, patient not seen in-person within last 12 months (provide details below)] : Telehealth services are continuing, patient not seen in-person within last 12 months.  [Telehealth (audio & video) - Individual/Group] : This visit was provided via telehealth using real-time 2-way audio visual technology. [Other Location: e.g. Home (Enter Location, City,State)___] : The provider was located at [unfilled]. [Home] : The patient, [unfilled], was located at home, [unfilled], at the time of the visit. [Patient's space is appropriate for telehealth and maintains privacy/confidentiality.] : Patient's space is appropriate for telehealth and maintains privacy/confidentiality. [Participant(s) identity verified] : Participant(s) identity verified. [Verbal consent obtained from patient/other participant(s)] : Verbal consent for telehealth/telephonic services obtained from patient/other participant(s) [FreeTextEntry4] : 4:06pm [FreeTextEntry5] : 5:00pm [FreeTextEntry3] : access issues, pt preference [Patient] : Patient [FreeTextEntry1] : complex trauma history

## 2024-09-17 NOTE — REASON FOR VISIT
[Patient preference] : as per patient preference [Access issues (e.g., transportation, impaired mobility, etc.)] : due to patient's access issues [Continuing, patient not seen in-person within last 12 months (provide details below)] : Telehealth services are continuing, patient not seen in-person within last 12 months.  [Telehealth (audio & video) - Individual/Group] : This visit was provided via telehealth using real-time 2-way audio visual technology. [Other Location: e.g. Home (Enter Location, City,State)___] : The provider was located at [unfilled]. [Home] : The patient, [unfilled], was located at home, [unfilled], at the time of the visit. [Patient's space is appropriate for telehealth and maintains privacy/confidentiality.] : Patient's space is appropriate for telehealth and maintains privacy/confidentiality. [Participant(s) identity verified] : Participant(s) identity verified. [Verbal consent obtained from patient/other participant(s)] : Verbal consent for telehealth/telephonic services obtained from patient/other participant(s) [FreeTextEntry4] : 5:05pm [FreeTextEntry5] : 6:07pm [FreeTextEntry3] : access issues, pt preference [Patient] : Patient [FreeTextEntry1] : complex trauma history

## 2024-09-19 ENCOUNTER — APPOINTMENT (OUTPATIENT)
Dept: PSYCHIATRY | Facility: CLINIC | Age: 34
End: 2024-09-19

## 2024-09-19 LAB
25(OH)D3 SERPL-MCNC: 24.9 NG/ML
ALBUMIN SERPL ELPH-MCNC: 4.3 G/DL
ALP BLD-CCNC: 45 U/L
ALT SERPL-CCNC: 29 U/L
ANION GAP SERPL CALC-SCNC: 13 MMOL/L
APPEARANCE: CLEAR
AST SERPL-CCNC: 25 U/L
BASOPHILS # BLD AUTO: 0.03 K/UL
BASOPHILS NFR BLD AUTO: 0.6 %
BILIRUB SERPL-MCNC: 0.5 MG/DL
BILIRUBIN URINE: NEGATIVE
BLOOD URINE: NEGATIVE
BUN SERPL-MCNC: 16 MG/DL
CALCIUM SERPL-MCNC: 9.1 MG/DL
CHLORIDE SERPL-SCNC: 108 MMOL/L
CHOLEST SERPL-MCNC: 144 MG/DL
CO2 SERPL-SCNC: 22 MMOL/L
COLOR: YELLOW
CREAT SERPL-MCNC: 0.85 MG/DL
EGFR: 92 ML/MIN/1.73M2
EOSINOPHIL # BLD AUTO: 0.18 K/UL
EOSINOPHIL NFR BLD AUTO: 3.6 %
ESTIMATED AVERAGE GLUCOSE: 97 MG/DL
GLUCOSE QUALITATIVE U: NEGATIVE MG/DL
GLUCOSE SERPL-MCNC: 91 MG/DL
HBA1C MFR BLD HPLC: 5 %
HCT VFR BLD CALC: 39.9 %
HDLC SERPL-MCNC: 75 MG/DL
HGB BLD-MCNC: 13.5 G/DL
IMM GRANULOCYTES NFR BLD AUTO: 0 %
KETONES URINE: NEGATIVE MG/DL
LDLC SERPL CALC-MCNC: 55 MG/DL
LEUKOCYTE ESTERASE URINE: NEGATIVE
LYMPHOCYTES # BLD AUTO: 2.33 K/UL
LYMPHOCYTES NFR BLD AUTO: 46.6 %
MAN DIFF?: NORMAL
MCHC RBC-ENTMCNC: 31.4 PG
MCHC RBC-ENTMCNC: 33.8 GM/DL
MCV RBC AUTO: 92.8 FL
MONOCYTES # BLD AUTO: 0.42 K/UL
MONOCYTES NFR BLD AUTO: 8.4 %
NEUTROPHILS # BLD AUTO: 2.04 K/UL
NEUTROPHILS NFR BLD AUTO: 40.8 %
NITRITE URINE: NEGATIVE
NONHDLC SERPL-MCNC: 68 MG/DL
PH URINE: 5.5
PLATELET # BLD AUTO: 179 K/UL
POTASSIUM SERPL-SCNC: 4.2 MMOL/L
PROT SERPL-MCNC: 6.2 G/DL
PROTEIN URINE: NORMAL MG/DL
RBC # BLD: 4.3 M/UL
RBC # FLD: 12.4 %
SODIUM SERPL-SCNC: 142 MMOL/L
SPECIFIC GRAVITY URINE: 1.03
TRIGL SERPL-MCNC: 70 MG/DL
TSH SERPL-ACNC: 1.62 UIU/ML
UROBILINOGEN URINE: 0.2 MG/DL
WBC # FLD AUTO: 5 K/UL

## 2024-09-23 ENCOUNTER — APPOINTMENT (OUTPATIENT)
Dept: PSYCHIATRY | Facility: CLINIC | Age: 34
End: 2024-09-23
Payer: COMMERCIAL

## 2024-09-23 PROCEDURE — 90837 PSYTX W PT 60 MINUTES: CPT | Mod: 95

## 2024-09-26 ENCOUNTER — APPOINTMENT (OUTPATIENT)
Dept: PSYCHIATRY | Facility: CLINIC | Age: 34
End: 2024-09-26
Payer: COMMERCIAL

## 2024-09-26 PROCEDURE — 90837 PSYTX W PT 60 MINUTES: CPT | Mod: 95

## 2024-09-26 NOTE — PHYSICAL EXAM
[Average] : average [Cooperative] : cooperative [Anxious] : anxious [Clear] : clear [Linear/Goal Directed] : linear/goal directed [WNL] : within normal limits [de-identified] : anxious, tearful

## 2024-09-26 NOTE — H&P ADULT - NSHPATTENDINGPLANDISCUSS_GEN_ALL_CORE
OFFICE VISIT      Patient: Judy Felix Date of Service: 2024   : 1951 MRN: [id number]     SUBJECTIVE:   HISTORY OF PRESENT ILLNESS:  Judy Felix is a 72 year old female who presents today for new pt visit  From [place]     Known COPD  thyroid etc  Been off thyroid med  Refer   Lab  Decline most screening     Chief Complaint   Patient presents with    Office Visit    Establish Care         PAST MEDICAL HISTORY:  Past Medical History:   Diagnosis Date    Emphysema of lung  (CMD)        MEDICATIONS:  Current Outpatient Medications   Medication Sig    Anoro Ellipta 62.5-25 MCG/ACT inhaler Inhale 1 puff into the lungs daily.    levothyroxine 75 MCG tablet [None received] (Patient not taking: Reported on 2024)    metoPROLOL tartrate (LOPRESSOR) 50 MG tablet [None received] (Patient not taking: Reported on 2024)     No current facility-administered medications for this visit.       ALLERGIES:  ALLERGIES:   Allergen Reactions    Iodinated Diagnostic Agents ANAPHYLAXIS    Morphine Nausea & Vomiting       PAST SURGICAL HISTORY:  Past Surgical History:   Procedure Laterality Date    No past surgeries         FAMILY HISTORY:  History reviewed. No pertinent family history.    SOCIAL HISTORY:  Social History     Tobacco Use    Smoking status: Former     Types: Cigarettes    Smokeless tobacco: Never   Vaping Use    Vaping status: never used   Substance Use Topics    Alcohol use: Not Currently       Review of Systems   Constitutional:  Negative for chills, fever and weight loss.   HENT:  Negative for congestion and sore throat.    Eyes:  Negative for blurred vision.   Respiratory:  Negative for cough, shortness of breath and wheezing.    Cardiovascular:  Negative for chest pain, palpitations and claudication.   Gastrointestinal:  Negative for abdominal pain, blood in stool and heartburn.   Genitourinary:  Negative for dysuria.   Musculoskeletal:  Negative for myalgias.   Skin:  Negative for itching and rash.    Neurological:  Negative for dizziness, focal weakness and headaches.   Endo/Heme/Allergies:  Does not bruise/bleed easily.   Psychiatric/Behavioral:  Negative for depression.    All other systems reviewed and are negative.      OBJECTIVE:     Physical Exam  Vitals and nursing note reviewed.   HENT:      Head: Normocephalic and atraumatic.      Right Ear: External ear normal.      Left Ear: External ear normal.      Nose: Nose normal.   Eyes:      Conjunctiva/sclera: Conjunctivae normal.      Pupils: Pupils are equal, round, and reactive to light.   Cardiovascular:      Rate and Rhythm: Normal rate and regular rhythm.      Heart sounds: Normal heart sounds. No murmur heard.  Pulmonary:      Effort: Pulmonary effort is normal. Prolonged expiration present.      Breath sounds: No wheezing.   Abdominal:      General: Bowel sounds are normal.      Palpations: Abdomen is soft.   Musculoskeletal:      Cervical back: Normal range of motion and neck supple.      Right lower leg: No edema.      Left lower leg: No edema.   Skin:     General: Skin is warm and dry.   Neurological:      Mental Status: She is alert and oriented to person, place, and time.      Gait: Gait is intact.   Psychiatric:         Mood and Affect: Mood and affect normal.         Judgment: Judgment normal.         Visit Vitals  BP (!) 141/80   Pulse 93   Resp 18   Ht 5' 7\" (1.702 m)   Wt 88 kg (194 lb 1.8 oz)   SpO2 90%   BMI 30.40 kg/m²       DIAGNOSTIC STUDIES:   LAB RESULTS:  No visits with results within 1 Month(s) from this visit.   Latest known visit with results is:   No results found for any previous visit.       Assessment AND PLAN:   This is a 72 year old year-old female who presents with       Problem List Items Addressed This Visit       Refused pneumococcal vaccine    Mammogram declined    Hypothyroidism - Primary    Relevant Orders    CBC with Automated Differential    Comprehensive Metabolic Panel    Glycohemoglobin    Thyroid Stimulating  Hormone Reflex    Lipid Panel With Reflex    Centrilobular emphysema  (CMD)    Relevant Orders    CBC with Automated Differential    Comprehensive Metabolic Panel    Glycohemoglobin    Thyroid Stimulating Hormone Reflex    Lipid Panel With Reflex    SERVICE TO PULMONARY MEDICINE    Ex-smoker    Relevant Orders    CBC with Automated Differential    Comprehensive Metabolic Panel    Glycohemoglobin    Thyroid Stimulating Hormone Reflex    Lipid Panel With Reflex    Colon cancer screening    Relevant Orders    Occult Blood - iFOB (aka FIT)         Please take medications as directed.    There are no discontinued medications.      Instructions provided as documented in the AVS.    Return in about 6 months (around 3/26/2025).      The patient indicated understanding of the diagnosis and agreed with the plan of care.         Dr Hawkins.

## 2024-09-26 NOTE — RISK ASSESSMENT
[No, patient denies ideation or behavior] : No, patient denies ideation or behavior Patient Weight (Optional But Required For Cumulative Dose-Numbers And Decimals Only): 100

## 2024-09-26 NOTE — REASON FOR VISIT
[Patient preference] : as per patient preference [Access issues (e.g., transportation, impaired mobility, etc.)] : due to patient's access issues [Continuing, patient not seen in-person within last 12 months (provide details below)] : Telehealth services are continuing, patient not seen in-person within last 12 months.  [Telehealth (audio & video) - Individual/Group] : This visit was provided via telehealth using real-time 2-way audio visual technology. [Other Location: e.g. Home (Enter Location, City,State)___] : The provider was located at [unfilled]. [Home] : The patient, [unfilled], was located at home, [unfilled], at the time of the visit. [Patient's space is appropriate for telehealth and maintains privacy/confidentiality.] : Patient's space is appropriate for telehealth and maintains privacy/confidentiality. [Participant(s) identity verified] : Participant(s) identity verified. [Verbal consent obtained from patient/other participant(s)] : Verbal consent for telehealth/telephonic services obtained from patient/other participant(s) [FreeTextEntry4] : 5;03pm [FreeTextEntry5] : 6:07pm [FreeTextEntry3] : access issues, pt preference [Patient] : Patient [FreeTextEntry1] : complex trauma history

## 2024-09-26 NOTE — PLAN
[FreeTextEntry2] : Adjust to life after father's death; and reduce complex trauma and anxiety symptoms  1) Reduce scores on symptom scales by at least 20% 2) Learn at least 2-3 coping skills [Cognitive Processing Therapy] : Cognitive Processing Therapy  [de-identified] : PROGRESS TO DATE/UPDATES: - Pt opened visit stating she had an "emergency".  Described conversation with boyfriend that she had yesterday during which pt felt empowered to speak up for her needs and set boundaries to not become his caregiver. Pt is considering ending the relationship. However, she fears that doing so would lead to her boyfriend attempting suicide as she anticipates that his parents would blame him. Pt denied that boyfriend expressed suicidality or threatened it in relation to their partnership); however, she remains concerned due to his history of depression and displays of helplessness in seeking care. Pt herself explicitly denied experiencing current/recent suicidality. She stated the only "self-harm" she is thinking about is becoming the caregiver of another adult again. - Additionally, Pt reflected on her reasons for smoking marijuana as related to coping with relationship problems. Pt discussed she does not want to resort to substances to cope with her life.  INTERVENTIONS/PLAN: - Risk assessment conducted. No acute safety issues were noted. Pt is aware of symptoms or other flags that would indicate the need for crisis or emergency services, and she is also aware of the emergency resources that are available should they be needed for her or her boyfriend. - Reflective listening provided.  Reflected pt's demonstration of living her values, as well as her activated core beliefs and thought patterns.  - Socratic dialogue implemented to help pt 1) explore the evidence for/against her worries, 2) identify potentially problematic patterns of thinking. and 3) gently challenge thoughts that may be lacking in evidence, may be extreme, or may be all-or-none thinking. Supported pt in beginning to acknowledge multiple options for managing her current situation.   - Co-created short-term coping plan. While still anxious and worried, pt appeared to be relatively more calm by end of today's session. - Follow-up visit already scheduled for later this week.  [Recommended Frequency of Visits: ____] : Recommended frequency of visits: [unfilled] [FreeTextEntry1] :  PLAN: Continue CPT.  Continue supportive grief counseling with CBT/DBT skills as appropriate. Continue monitoring risk level. Pt will maintain follow-up with psychiatrist.

## 2024-09-27 ENCOUNTER — APPOINTMENT (OUTPATIENT)
Dept: PLASTIC SURGERY | Facility: CLINIC | Age: 34
End: 2024-09-27

## 2024-09-27 DIAGNOSIS — M79.3 PANNICULITIS, UNSPECIFIED: ICD-10-CM

## 2024-09-27 DIAGNOSIS — R63.4 ABNORMAL WEIGHT LOSS: ICD-10-CM

## 2024-09-27 DIAGNOSIS — E65 LOCALIZED ADIPOSITY: ICD-10-CM

## 2024-09-27 PROCEDURE — 99213 OFFICE O/P EST LOW 20 MIN: CPT

## 2024-09-29 NOTE — ADDENDUM
[FreeTextEntry1] :  I, Israel Valdez, documented this note as a scribe on behalf of Dr. Lauren Shikowitz-Behr, MD on 09/27/2024.

## 2024-09-29 NOTE — PHYSICAL EXAM
[NI] : Normal [de-identified] : NAD, AxOx3  [de-identified] : nonlabored breathing  [de-identified] : Bilateral grade 3 ptosis  Deflated upper pole with stretch marks  [de-identified] : Abdominal incision well healed  Small dog ear deformity at the upper midline incision [de-identified] : Excess skin and fat redundancy in the batwing distribution with stretch marks throughout  [de-identified] : normal affect

## 2024-09-29 NOTE — PHYSICAL EXAM
[NI] : Normal [de-identified] : NAD, AxOx3  [de-identified] : nonlabored breathing  [de-identified] : Bilateral grade 3 ptosis  Deflated upper pole with stretch marks  [de-identified] : Excess skin and fat redundancy in the batwing distribution with stretch marks throughout  [de-identified] : Abdominal incision well healed  Small dog ear deformity at the upper midline incision [de-identified] : normal affect

## 2024-09-29 NOTE — END OF VISIT
[FreeTextEntry3] : All medical record entries made by the Scribe were at my, Dr. Lauren Shikowitz-Behr, MD, direction and personally dictated by me on 09/27/2024. I have reviewed the chart and agree that the record accurately reflects my personal performance of the history, physical exam, assessment and plan. I have also personally directed, reviewed, and agreed with the chart.

## 2024-09-29 NOTE — HISTORY OF PRESENT ILLNESS
[FreeTextEntry1] : DEBORAH HENRY is a 34 year old female who presents today s/p Panniculectomy/Sarsi-hj-Nym skin excision on 4/15/24. Patient complains of a persistent dog ear deformity at the upper midline incision. She aso inquires about brachioplasty and mastopexy techniques.

## 2024-09-30 NOTE — PLAN
[FreeTextEntry2] : Adjust to life after father's death; and reduce complex trauma and anxiety symptoms  1) Reduce scores on symptom scales by at least 20% 2) Learn at least 2-3 coping skills [Cognitive Processing Therapy] : Cognitive Processing Therapy  [de-identified] :  PROGRESS TO DATE/UPDATES: Since last visit, pt has had several important and honest conversations with boyfriend and brother. Describes no longer avoiding difficult conversations.  Describes realizing she is now safe and wants to move towards focusing on her happiness. Pt asked boyfriend whether he is thinking about killing himself or whether he would if they break-up; he reassured her that he did not have suicidal thoughts.  INTERVENTIONS/PLAN: - Reflected pt's values and reinforced new belief system pt has developed. Pt expressed feeling proud of herself and glad to be prioritizing herself over others' needs. - Socratic dialogue to reinforce pt's confrontation of power/control-related stuck points. - Discussed Ways of Giving and Taking Power using handout, discussion, general examples. Assisted pt in identifying personal examples. - Introduced the Core Theme of Esteem, and helped pt identify any existing esteem-related Stuck Points. Supported pt in beginning to challenge a stuck point within this theme. - For home practice, pt will complete 1 Challenging Beliefs Worksheet per day, focusing on esteem-related stuck points. Pt also will practice giving/receiving compliments and will do something nice for herself without having to do anything to earn it. Handouts provided.  [Recommended Frequency of Visits: ____] : Recommended frequency of visits: [unfilled] [FreeTextEntry1] :  PLAN: Continue CPT.  Continue supportive grief counseling with CBT/DBT skills as appropriate. Continue monitoring risk level. Pt will maintain follow-up with psychiatrist.

## 2024-09-30 NOTE — REASON FOR VISIT
[Patient preference] : as per patient preference [Access issues (e.g., transportation, impaired mobility, etc.)] : due to patient's access issues [Continuing, patient not seen in-person within last 12 months (provide details below)] : Telehealth services are continuing, patient not seen in-person within last 12 months.  [Telehealth (audio & video) - Individual/Group] : This visit was provided via telehealth using real-time 2-way audio visual technology. [Other Location: e.g. Home (Enter Location, City,State)___] : The provider was located at [unfilled]. [Home] : The patient, [unfilled], was located at home, [unfilled], at the time of the visit. [Patient's space is appropriate for telehealth and maintains privacy/confidentiality.] : Patient's space is appropriate for telehealth and maintains privacy/confidentiality. [Participant(s) identity verified] : Participant(s) identity verified. [Verbal consent obtained from patient/other participant(s)] : Verbal consent for telehealth/telephonic services obtained from patient/other participant(s) [FreeTextEntry4] : 5:04pm [FreeTextEntry5] : 6:07pm [FreeTextEntry3] : access issues, pt preference [Patient] : Patient [FreeTextEntry1] : complex trauma history

## 2024-09-30 NOTE — PHYSICAL EXAM
[Average] : average [Cooperative] : cooperative [Euthymic] : euthymic [Full] : full [Clear] : clear [Linear/Goal Directed] : linear/goal directed [WNL] : within normal limits [de-identified] : resolved

## 2024-10-03 ENCOUNTER — APPOINTMENT (OUTPATIENT)
Dept: PSYCHIATRY | Facility: CLINIC | Age: 34
End: 2024-10-03
Payer: COMMERCIAL

## 2024-10-03 DIAGNOSIS — F12.90 CANNABIS USE, UNSPECIFIED, UNCOMPLICATED: ICD-10-CM

## 2024-10-03 DIAGNOSIS — F41.9 ANXIETY DISORDER, UNSPECIFIED: ICD-10-CM

## 2024-10-03 DIAGNOSIS — F43.10 POST-TRAUMATIC STRESS DISORDER, UNSPECIFIED: ICD-10-CM

## 2024-10-03 PROCEDURE — 99214 OFFICE O/P EST MOD 30 MIN: CPT | Mod: 95

## 2024-10-03 PROCEDURE — G2211 COMPLEX E/M VISIT ADD ON: CPT | Mod: 95

## 2024-10-03 PROCEDURE — 90834 PSYTX W PT 45 MINUTES: CPT | Mod: 95

## 2024-10-03 NOTE — REASON FOR VISIT
[Patient preference] : as per patient preference [Access issues (e.g., transportation, impaired mobility, etc.)] : due to patient's access issues [Continuing, patient not seen in-person within last 12 months (provide details below)] : Telehealth services are continuing, patient not seen in-person within last 12 months.  [Telehealth (audio & video) - Individual/Group] : This visit was provided via telehealth using real-time 2-way audio visual technology. [Other Location: e.g. Home (Enter Location, City,State)___] : The provider was located at [unfilled]. [Home] : The patient, [unfilled], was located at home, [unfilled], at the time of the visit. [Patient's space is appropriate for telehealth and maintains privacy/confidentiality.] : Patient's space is appropriate for telehealth and maintains privacy/confidentiality. [Participant(s) identity verified] : Participant(s) identity verified. [Verbal consent obtained from patient/other participant(s)] : Verbal consent for telehealth/telephonic services obtained from patient/other participant(s) [FreeTextEntry4] : 5:06pm [FreeTextEntry5] : 5:57pm [FreeTextEntry3] : access issues, pt preference [Patient] : Patient [FreeTextEntry1] : complex trauma history

## 2024-10-03 NOTE — HISTORY OF PRESENT ILLNESS
[FreeTextEntry1] : Refer to intake documentation from 4/9/24 for details.   [FreeTextEntry2] : Refer to intake documentation from 4/9/24 for details.   [FreeTextEntry3] : Current Psych Meds: -Wellbutrin XL   Past Psych Meds: Contrave fluoxetine aripiprazole topiramate quetiapine   PDMP reviewed, ref #   [id number] Prescriptions monthly for medical marijuana rx by Yohan Parson MD

## 2024-10-03 NOTE — PLAN
[FreeTextEntry5] : 1. PTSD, r/o CHRIS --Continue Wellbutrin  mg daily. She is aware this is an off-label use since Wellbutrin is not approved for PTSD, however prefers to take this vs SSRI, since she has taken bupropion as a component of Contrave for ~ year and felt significant benefits from it.  --She will continue weekly psychotherapy @ [facility]  --Follow-up in three months or sooner PRN   2. Cannabis Use Disorder --Counseled on importance of reducing use, ideally abstaining from all use.   - I have given my usual talk about the risks and benefits of all treatment recommendations including alternatives and the risks and benefits of no treatment at all. Patient had the opportunity to have all questions answered to their satisfaction. They expressed understanding and agreement with the above treatment plan. - Educated patient of importance of remaining abstinent from drugs and alcohol. - Discussed that unpredictable effects including cardiorespiratory collapse can result from the combination of illicit drugs and prescribed medications. Patient verbalized understanding. - Emergency procedures were discussed: pt. educated to call 911 or go to nearest ER for worsening of symptoms/suicidal/homicidal ideation. - Patient given opportunity to ask questions and their questions were answered and they expressed understanding and agreement with above plan.

## 2024-10-03 NOTE — DISCUSSION/SUMMARY
[Low acute suicide risk] : Low acute suicide risk [Yes] : Yes [Not clinically indicated] : Safety Plan completed/updated (for individuals at risk): Not clinically indicated [FreeTextEntry1] : 4/9/24- Start Wellbutrin  mg daily pending surgeon's approval post-op. She will call for appointment 3-4 weeks following the point at which she can start the medication.  5/7/24- Increase Wellbutrin XL to 300 mg daily. Continue psychotherapy with current provider. 8/13/24- Continue Wellbutrin  mg daily. Continue psychotherapy. F/u 3 months.  10/3/24- Globally improved. Continue Wellbutrin  mg daily. Continue psychotherapy w/ current provider. F/u 3 months. [FreeTextEntry2] : see hpi

## 2024-10-03 NOTE — PHYSICAL EXAM
[Average] : average [Cooperative] : cooperative [Euthymic] : euthymic [Full] : full [Clear] : clear [Linear/Goal Directed] : linear/goal directed [WNL] : within normal limits [de-identified] : briefly tearful

## 2024-10-03 NOTE — PLAN
[FreeTextEntry2] : Adjust to life after father's death; and reduce complex trauma and anxiety symptoms  1) Reduce scores on symptom scales by at least 20% 2) Learn at least 2-3 coping skills [Cognitive Processing Therapy] : Cognitive Processing Therapy  [de-identified] :  PROGRESS TO DATE/UPDATES: -	Feels tired due to busy week, but overall okay. Continues to have difficulty yet important conversations with boyfriend. Plans to move out -	Feels more confident to be herself when talking to people. Describes increased self-respect. -	Completed 1 esteem-related Challenging Beliefs Worksheet, which she then shared with boyfriend. -	Practiced doing nice things for herself and receiving compliments.  INTERVENTIONS/PLAN: - Socratic dialogue to support pt's review of completed Challenging Beliefs worksheet and reinforce confrontation of esteem-related stuck points. - Introduced the Core Theme of Intimacy, and helped pt identify any existing intimacy-related Stuck Points on the log.  - For home practice: (1) Pt will complete 1 Challenging Beliefs Worksheet per day, focusing on intimacy-related stuck points. (2) Pt will continue to practice giving/receiving compliments and doing something nice for herself without having to do anything to earn it. (3) Pt will write new Impact Statement. Handouts provided.   - Reviewed progress towards treatment goals and collaboratively agreed to decrease visit frequency to one time per week. [Recommended Frequency of Visits: ____] : Recommended frequency of visits: [unfilled] [FreeTextEntry1] :  PLAN: Continue CPT.  Continue supportive grief counseling with CBT/DBT skills as appropriate. Continue monitoring risk level. Pt will maintain follow-up with psychiatrist.

## 2024-10-03 NOTE — REASON FOR VISIT
[Patient preference] : as per patient preference [Continuing, patient seen in-person within last 12 months] : Telehealth services are continuing as patient has been seen in-person within last 12 months. [Telehealth (audio & video) - Individual/Group] : This visit was provided via telehealth using real-time 2-way audio visual technology. [Home] : The patient, [unfilled], was located at home, [unfilled], at the time of the visit. [Verbal consent obtained from patient/other participant(s)] : Verbal consent for telehealth/telephonic services obtained from patient/other participant(s) [Other Location: e.g. Home (Enter Location, City,State)___] : The provider was located at [unfilled]. [FreeTextEntry4] : 13:15 [FreeTextEntry2] : 4/9/24 [FreeTextEntry5] : 13:30 [FreeTextEntry1] : follow-up psych med management

## 2024-10-07 PROBLEM — L90.5 SCAR CONTRACTURE: Status: ACTIVE | Noted: 2024-10-07

## 2024-10-10 ENCOUNTER — APPOINTMENT (OUTPATIENT)
Dept: PSYCHIATRY | Facility: CLINIC | Age: 34
End: 2024-10-10

## 2024-10-12 ENCOUNTER — APPOINTMENT (OUTPATIENT)
Dept: DERMATOLOGY | Facility: CLINIC | Age: 34
End: 2024-10-12
Payer: COMMERCIAL

## 2024-10-12 DIAGNOSIS — L21.9 SEBORRHEIC DERMATITIS, UNSPECIFIED: ICD-10-CM

## 2024-10-12 DIAGNOSIS — L70.0 ACNE VULGARIS: ICD-10-CM

## 2024-10-12 PROCEDURE — 99203 OFFICE O/P NEW LOW 30 MIN: CPT

## 2024-10-16 ENCOUNTER — APPOINTMENT (OUTPATIENT)
Dept: INTERNAL MEDICINE | Facility: CLINIC | Age: 34
End: 2024-10-16
Payer: COMMERCIAL

## 2024-10-16 VITALS
BODY MASS INDEX: 29.81 KG/M2 | RESPIRATION RATE: 16 BRPM | OXYGEN SATURATION: 99 % | HEART RATE: 79 BPM | HEIGHT: 62 IN | DIASTOLIC BLOOD PRESSURE: 78 MMHG | TEMPERATURE: 97.5 F | SYSTOLIC BLOOD PRESSURE: 126 MMHG | WEIGHT: 162 LBS

## 2024-10-16 DIAGNOSIS — R09.82 POSTNASAL DRIP: ICD-10-CM

## 2024-10-16 DIAGNOSIS — M19.031 PRIMARY OSTEOARTHRITIS, RIGHT WRIST: ICD-10-CM

## 2024-10-16 DIAGNOSIS — E66.3 OVERWEIGHT: ICD-10-CM

## 2024-10-16 DIAGNOSIS — L98.7 EXCESSIVE AND REDUNDANT SKIN AND SUBCUTANEOUS TISSUE: ICD-10-CM

## 2024-10-16 DIAGNOSIS — Z87.39 PERSONAL HISTORY OF OTHER DISEASES OF THE MUSCULOSKELETAL SYSTEM AND CONNECTIVE TISSUE: ICD-10-CM

## 2024-10-16 DIAGNOSIS — R63.4 ABNORMAL WEIGHT LOSS: ICD-10-CM

## 2024-10-16 DIAGNOSIS — E66.9 OBESITY, UNSPECIFIED: ICD-10-CM

## 2024-10-16 PROCEDURE — 99214 OFFICE O/P EST MOD 30 MIN: CPT

## 2024-10-16 RX ORDER — FLUTICASONE PROPIONATE 50 UG/1
50 SPRAY, METERED NASAL TWICE DAILY
Qty: 3 | Refills: 1 | Status: ACTIVE | COMMUNITY
Start: 2024-10-16 | End: 1900-01-01

## 2024-10-17 ENCOUNTER — APPOINTMENT (OUTPATIENT)
Dept: PSYCHIATRY | Facility: CLINIC | Age: 34
End: 2024-10-17
Payer: COMMERCIAL

## 2024-10-17 PROCEDURE — 90837 PSYTX W PT 60 MINUTES: CPT | Mod: 95

## 2024-10-24 ENCOUNTER — APPOINTMENT (OUTPATIENT)
Dept: PSYCHIATRY | Facility: CLINIC | Age: 34
End: 2024-10-24
Payer: COMMERCIAL

## 2024-10-24 DIAGNOSIS — F43.21 ADJUSTMENT DISORDER WITH DEPRESSED MOOD: ICD-10-CM

## 2024-10-24 PROCEDURE — 90837 PSYTX W PT 60 MINUTES: CPT | Mod: 95

## 2024-10-29 ENCOUNTER — APPOINTMENT (OUTPATIENT)
Dept: PSYCHIATRY | Facility: CLINIC | Age: 34
End: 2024-10-29
Payer: COMMERCIAL

## 2024-10-29 PROCEDURE — 90837 PSYTX W PT 60 MINUTES: CPT | Mod: 95

## 2024-10-29 RX ORDER — CLINDAMYCIN PHOSPHATE 10 MG/ML
1 SOLUTION TOPICAL
Qty: 1 | Refills: 5 | Status: ACTIVE | COMMUNITY
Start: 2024-10-29 | End: 1900-01-01

## 2024-10-31 ENCOUNTER — APPOINTMENT (OUTPATIENT)
Dept: PSYCHIATRY | Facility: CLINIC | Age: 34
End: 2024-10-31
Payer: COMMERCIAL

## 2024-10-31 PROCEDURE — 90837 PSYTX W PT 60 MINUTES: CPT | Mod: 95

## 2024-11-08 ENCOUNTER — APPOINTMENT (OUTPATIENT)
Dept: PSYCHIATRY | Facility: CLINIC | Age: 34
End: 2024-11-08
Payer: COMMERCIAL

## 2024-11-08 DIAGNOSIS — F43.10 POST-TRAUMATIC STRESS DISORDER, UNSPECIFIED: ICD-10-CM

## 2024-11-08 DIAGNOSIS — F41.9 ANXIETY DISORDER, UNSPECIFIED: ICD-10-CM

## 2024-11-08 PROCEDURE — 90837 PSYTX W PT 60 MINUTES: CPT | Mod: 95

## 2024-11-12 ENCOUNTER — APPOINTMENT (OUTPATIENT)
Dept: PSYCHIATRY | Facility: CLINIC | Age: 34
End: 2024-11-12

## 2024-11-12 PROCEDURE — 99214 OFFICE O/P EST MOD 30 MIN: CPT | Mod: 95

## 2024-11-12 RX ORDER — HYDROXYZINE HYDROCHLORIDE 25 MG/1
25 TABLET ORAL
Qty: 30 | Refills: 0 | Status: ACTIVE | COMMUNITY
Start: 2024-11-12 | End: 1900-01-01

## 2024-11-13 ENCOUNTER — APPOINTMENT (OUTPATIENT)
Dept: PSYCHIATRY | Facility: CLINIC | Age: 34
End: 2024-11-13
Payer: COMMERCIAL

## 2024-11-13 PROCEDURE — 90834 PSYTX W PT 45 MINUTES: CPT | Mod: 95

## 2024-11-20 ENCOUNTER — APPOINTMENT (OUTPATIENT)
Dept: PSYCHIATRY | Facility: CLINIC | Age: 34
End: 2024-11-20
Payer: COMMERCIAL

## 2024-11-20 DIAGNOSIS — F43.21 ADJUSTMENT DISORDER WITH DEPRESSED MOOD: ICD-10-CM

## 2024-11-20 PROCEDURE — 90837 PSYTX W PT 60 MINUTES: CPT | Mod: 95

## 2024-11-21 NOTE — REVIEW OF SYSTEMS
Continue Regimen: Triamcinolone Cream (Apply a thin layer BID to AA. Too strong for face, groin, or axilla)\\nVtama (Apply a thin layer to AA BID PRN) Initiate Treatment: Skyrizi 150 mg SC every 12 weeks- next injection is 01/15/2025 Render In Strict Bullet Format?: No Detail Level: Zone Initiate Treatment: Zoryve 0.3 % topical foam- Sending to [facility] per patient \\nSig: Apply to scalp nightly and rinse in the AM [Patient Intake Form Reviewed] : Patient intake form was reviewed [Negative] : Allergic/Immunologic [MED-ROS-Cons-FT] : fatigue [MED-ROS-Gastro-FT] : constipation, diarrhea, colorectal problems [MED-ROS-Willis-FT] : LBP [MED-ROS-Integum-FT] : eczema [MED-ROS-Neuro-FT] : migraines,  [MED-ROS-Psych-FT] : depression/anxiety [MED-ROS-Endo-FT] : PCOS on OCPs

## 2024-11-26 ENCOUNTER — APPOINTMENT (OUTPATIENT)
Dept: PLASTIC SURGERY | Facility: CLINIC | Age: 34
End: 2024-11-26

## 2024-11-26 VITALS
HEIGHT: 62 IN | DIASTOLIC BLOOD PRESSURE: 72 MMHG | BODY MASS INDEX: 29.81 KG/M2 | OXYGEN SATURATION: 99 % | SYSTOLIC BLOOD PRESSURE: 107 MMHG | WEIGHT: 162 LBS | HEART RATE: 72 BPM | RESPIRATION RATE: 16 BRPM

## 2024-11-26 DIAGNOSIS — N64.81 PTOSIS OF BREAST: ICD-10-CM

## 2024-11-26 DIAGNOSIS — R63.4 ABNORMAL WEIGHT LOSS: ICD-10-CM

## 2024-11-26 DIAGNOSIS — L90.5 SCAR CONDITIONS AND FIBROSIS OF SKIN: ICD-10-CM

## 2024-11-26 PROCEDURE — 99214 OFFICE O/P EST MOD 30 MIN: CPT

## 2024-11-27 ENCOUNTER — APPOINTMENT (OUTPATIENT)
Dept: PSYCHIATRY | Facility: CLINIC | Age: 34
End: 2024-11-27
Payer: COMMERCIAL

## 2024-11-27 DIAGNOSIS — F41.9 ANXIETY DISORDER, UNSPECIFIED: ICD-10-CM

## 2024-11-27 PROCEDURE — 90837 PSYTX W PT 60 MINUTES: CPT | Mod: 95

## 2024-11-29 PROBLEM — N64.81 PTOSIS OF BOTH BREASTS: Status: ACTIVE | Noted: 2024-11-29

## 2024-12-03 ENCOUNTER — APPOINTMENT (OUTPATIENT)
Dept: PSYCHIATRY | Facility: CLINIC | Age: 34
End: 2024-12-03
Payer: COMMERCIAL

## 2024-12-03 DIAGNOSIS — F12.90 CANNABIS USE, UNSPECIFIED, UNCOMPLICATED: ICD-10-CM

## 2024-12-03 PROCEDURE — 99214 OFFICE O/P EST MOD 30 MIN: CPT | Mod: 95

## 2024-12-03 PROCEDURE — G2211 COMPLEX E/M VISIT ADD ON: CPT | Mod: 95

## 2024-12-06 ENCOUNTER — APPOINTMENT (OUTPATIENT)
Dept: PSYCHIATRY | Facility: CLINIC | Age: 34
End: 2024-12-06

## 2024-12-10 ENCOUNTER — APPOINTMENT (OUTPATIENT)
Dept: PSYCHIATRY | Facility: CLINIC | Age: 34
End: 2024-12-10
Payer: COMMERCIAL

## 2024-12-10 DIAGNOSIS — F43.10 POST-TRAUMATIC STRESS DISORDER, UNSPECIFIED: ICD-10-CM

## 2024-12-10 PROCEDURE — 90834 PSYTX W PT 45 MINUTES: CPT | Mod: 95

## 2024-12-13 ENCOUNTER — APPOINTMENT (OUTPATIENT)
Dept: PSYCHIATRY | Facility: CLINIC | Age: 34
End: 2024-12-13

## 2024-12-13 PROCEDURE — 90834 PSYTX W PT 45 MINUTES: CPT | Mod: 95

## 2024-12-20 ENCOUNTER — APPOINTMENT (OUTPATIENT)
Dept: PSYCHIATRY | Facility: CLINIC | Age: 34
End: 2024-12-20
Payer: COMMERCIAL

## 2024-12-20 DIAGNOSIS — F43.10 POST-TRAUMATIC STRESS DISORDER, UNSPECIFIED: ICD-10-CM

## 2024-12-20 DIAGNOSIS — F41.9 ANXIETY DISORDER, UNSPECIFIED: ICD-10-CM

## 2024-12-20 PROCEDURE — 90837 PSYTX W PT 60 MINUTES: CPT | Mod: 95

## 2025-01-03 ENCOUNTER — APPOINTMENT (OUTPATIENT)
Dept: PSYCHIATRY | Facility: CLINIC | Age: 35
End: 2025-01-03
Payer: COMMERCIAL

## 2025-01-03 DIAGNOSIS — F41.9 ANXIETY DISORDER, UNSPECIFIED: ICD-10-CM

## 2025-01-03 DIAGNOSIS — F43.10 POST-TRAUMATIC STRESS DISORDER, UNSPECIFIED: ICD-10-CM

## 2025-01-03 PROCEDURE — 90837 PSYTX W PT 60 MINUTES: CPT | Mod: 95

## 2025-01-04 NOTE — DISCHARGE NOTE PROVIDER - NSDCCPCAREPLAN_GEN_ALL_CORE_FT
"Goal Outcome Evaluation:  Plan of Care Reviewed With: patient        Progress: improving  Outcome Evaluation: Patient A/O x 4. VSS. On room air. Still very impulsive. Frequent voiding to bathroom; occurrences of diarrhea are lessening. No complaints of chest \"tightness\" or \"numbness\". Safety maintained.                             " PRINCIPAL DISCHARGE DIAGNOSIS  Diagnosis: Panniculitis, unspecified  Assessment and Plan of Treatment:

## 2025-01-08 ENCOUNTER — NON-APPOINTMENT (OUTPATIENT)
Age: 35
End: 2025-01-08

## 2025-01-16 NOTE — PHYSICAL THERAPY INITIAL EVALUATION ADULT - ACTIVE RANGE OF MOTION EXAMINATION, REHAB EVAL
Anesthesia Pre Eval Note    Anesthesia ROS/Med Hx    Overall Review:  EKG was reviewed, Echo was reviewed and Stress test was reviewed     Anesthetic Complication History:    Patient does not have a history of anesthetic complications      Pulmonary Review:  Patient does not have a pulmonary history      Neuro/Psych Review:  Patient does not have a neuro/psych history         Cardiovascular Review:     Positive for hyperlipidemia    GI/HEPATIC/RENAL Review:   Positive for hiatal hernia  Positive for GERD    End/Other Review:  Patient does not have an endo/other history    Additional Results:  EKG:  No results found for this or any previous visit (from the past 4464 hour(s)). Echo:  No results found for: \"EF\"Stress Test:  No valid procedures specified.     ALLERGIES:   -- Alendronic Acid -- Other (See Comments)    --  Worsening of GERD   -- Iodine   (Environmental Or Med) -- HIVES and RASH   -- Pilocarpine -- Other (See Comments)    --  nausea   Last Labs        Component                Value               Date/Time                  Sodium                   144                 2024 0837            Potassium                4.6                 2024 0837            Chloride                 114 (H)             2024 0837            Carbon Dioxide           26                  2024 0837            Glucose                  121 (H)             2024 0837            BUN                      12                  2024 0837            Creatinine               0.59                2024 0837            Glomerular Filtrati*     >90                 2024 0837            Calcium                  9.3                 2024 0837        Past Medical History:  No date: GERD (gastroesophageal reflux disease)  No date: Hypercholesteremia  No date: Osteoporosis  No date: Raynaud's disease  Past Surgical History:  No date:  section, low transverse  No date: Ovarian cyst removal; N/A 
  Prior to Admission medications :  Medication famotidine (PEPCID) 40 MG tablet, Sig Take 1 tablet by mouth nightly., Start Date 9/17/24, End Date , Taking? , Authorizing Provider Paul Galarza MD    Medication atorvastatin (LIPITOR) 10 MG tablet, Sig Take 1 tablet by mouth daily., Start Date 5/24/23, End Date , Taking? , Authorizing Provider Paul Galarza MD    Medication NIFEdipine XL (PROCARDIA XL) 30 MG 24 hr tablet, Sig , Start Date 10/5/22, End Date , Taking? , Authorizing Provider Provider, Outside         No data found.  Social history reviewed:  Social History     Tobacco Use   Smoking Status Never   Smokeless Tobacco Never        E-Cigarette/Vaping Substances & Devices    E-Cigarette/Vaping Use Never Used        Social History     Substance and Sexual Activity   Alcohol Use Never           Relevant Problems   No relevant active problems       Physical Exam     Airway   Mallampati: II  TM Distance: >3 FB  Neck ROM: Full  Neck: Non-tender and Able to place in sniff position  TMJ Mobility: Good    Cardiovascular  Cardiovascular exam normal  Cardio Rhythm: Regular  Cardio Rate: Normal    Head Assessment  Head assessment: Normocephalic and Atraumatic    General Assessment  General Assessment: Alert and oriented and No acute distress    Dental Exam  Dental exam normal  Patient has:  Poor Dentition  Dental Note: Multiple cracked/chipped    Pulmonary Exam  Pulmonary exam normal  Breath sounds clear to auscultation:  Yes    Abdominal Exam  Abdominal exam normal      Anesthesia Plan:    ASA Status: 3  Anesthesia Type: General    Induction: Intravenous  Preferred Airway Type: ETT  Maintenance: Inhalational    Post-op Pain Management: Per Surgeon      Checklist  Reviewed: NPO Status, Allergies, Medications, Problem list and Past Med History  Consent/Risks Discussed Statement:  The proposed anesthetic plan, including its risks and benefits, have been discussed with the Patient along with the risks and benefits of 
alternatives. Questions were encouraged and answered and the patient and/or representative understands and agrees to proceed.    I have discussed elements of the patient's history or examination, as noted above and/or as follows, that place the patient at higher risk of complications; age.    I discussed with the patient (and/or patient's legal representative) the risks and benefits of the proposed anesthesia plan, General, which may include services performed by other anesthesia providers.    Alternative anesthesia plans, if available, were reviewed with the patient (and/or patient's legal representative). Discussion has been held with the patient (and/or patient's legal representative) regarding risks of anesthesia, which include Nausea, Vomiting, Dental Injury, allergic reaction, anxiety, aspiration, back pain, dental injury, intra-operative awareness, hypotension, headache, eye injury, emergence delirium, depressed breathing, memory loss, nausea, nerve injury, oral injury, organ damage, persistent pain, sore throat and vomiting and emergent situations that may require change in anesthesia plan.    The patient (and/or patient's legal representative) has indicated understanding, his/her questions have been answered, and he/she wishes to proceed with the planned anesthetic.    Blood Products: Not Anticipated    
no Active ROM deficits were identified
no Active ROM deficits were identified

## 2025-01-24 ENCOUNTER — APPOINTMENT (OUTPATIENT)
Dept: PSYCHIATRY | Facility: CLINIC | Age: 35
End: 2025-01-24
Payer: COMMERCIAL

## 2025-01-24 DIAGNOSIS — F43.10 POST-TRAUMATIC STRESS DISORDER, UNSPECIFIED: ICD-10-CM

## 2025-01-24 DIAGNOSIS — F41.9 ANXIETY DISORDER, UNSPECIFIED: ICD-10-CM

## 2025-01-24 PROCEDURE — 90837 PSYTX W PT 60 MINUTES: CPT | Mod: 95

## 2025-02-04 ENCOUNTER — APPOINTMENT (OUTPATIENT)
Dept: PSYCHIATRY | Facility: CLINIC | Age: 35
End: 2025-02-04
Payer: COMMERCIAL

## 2025-02-04 DIAGNOSIS — F43.10 POST-TRAUMATIC STRESS DISORDER, UNSPECIFIED: ICD-10-CM

## 2025-02-04 DIAGNOSIS — F12.90 CANNABIS USE, UNSPECIFIED, UNCOMPLICATED: ICD-10-CM

## 2025-02-04 PROCEDURE — 99214 OFFICE O/P EST MOD 30 MIN: CPT | Mod: 95

## 2025-02-04 PROCEDURE — G2211 COMPLEX E/M VISIT ADD ON: CPT | Mod: 95

## 2025-02-27 ENCOUNTER — APPOINTMENT (OUTPATIENT)
Dept: PSYCHIATRY | Facility: CLINIC | Age: 35
End: 2025-02-27
Payer: COMMERCIAL

## 2025-02-27 DIAGNOSIS — F43.10 POST-TRAUMATIC STRESS DISORDER, UNSPECIFIED: ICD-10-CM

## 2025-02-27 PROCEDURE — 90791 PSYCH DIAGNOSTIC EVALUATION: CPT | Mod: 95

## 2025-03-13 ENCOUNTER — APPOINTMENT (OUTPATIENT)
Dept: PSYCHIATRY | Facility: CLINIC | Age: 35
End: 2025-03-13

## 2025-03-13 ENCOUNTER — APPOINTMENT (OUTPATIENT)
Dept: PSYCHIATRY | Facility: CLINIC | Age: 35
End: 2025-03-13
Payer: COMMERCIAL

## 2025-03-13 DIAGNOSIS — F43.10 POST-TRAUMATIC STRESS DISORDER, UNSPECIFIED: ICD-10-CM

## 2025-03-13 PROCEDURE — 90837 PSYTX W PT 60 MINUTES: CPT | Mod: 95

## 2025-03-24 ENCOUNTER — RX RENEWAL (OUTPATIENT)
Age: 35
End: 2025-03-24

## 2025-04-10 ENCOUNTER — APPOINTMENT (OUTPATIENT)
Dept: PSYCHIATRY | Facility: CLINIC | Age: 35
End: 2025-04-10
Payer: COMMERCIAL

## 2025-04-10 DIAGNOSIS — F12.90 CANNABIS USE, UNSPECIFIED, UNCOMPLICATED: ICD-10-CM

## 2025-04-10 DIAGNOSIS — F43.10 POST-TRAUMATIC STRESS DISORDER, UNSPECIFIED: ICD-10-CM

## 2025-04-10 DIAGNOSIS — F43.23 ADJUSTMENT DISORDER WITH MIXED ANXIETY AND DEPRESSED MOOD: ICD-10-CM

## 2025-04-10 PROCEDURE — 90837 PSYTX W PT 60 MINUTES: CPT | Mod: 95

## 2025-04-10 PROCEDURE — 99214 OFFICE O/P EST MOD 30 MIN: CPT | Mod: 95

## 2025-04-10 RX ORDER — TRAZODONE HYDROCHLORIDE 50 MG/1
50 TABLET ORAL
Qty: 30 | Refills: 0 | Status: ACTIVE | COMMUNITY
Start: 2025-04-10 | End: 1900-01-01

## 2025-04-12 ENCOUNTER — APPOINTMENT (OUTPATIENT)
Dept: DERMATOLOGY | Facility: CLINIC | Age: 35
End: 2025-04-12

## 2025-04-13 PROBLEM — F43.23 ADJUSTMENT DISORDER WITH MIXED ANXIETY AND DEPRESSED MOOD: Status: ACTIVE | Noted: 2025-04-10

## 2025-04-17 ENCOUNTER — APPOINTMENT (OUTPATIENT)
Dept: PSYCHIATRY | Facility: CLINIC | Age: 35
End: 2025-04-17
Payer: COMMERCIAL

## 2025-04-17 PROCEDURE — 90837 PSYTX W PT 60 MINUTES: CPT | Mod: 95

## 2025-04-24 ENCOUNTER — APPOINTMENT (OUTPATIENT)
Dept: PSYCHIATRY | Facility: CLINIC | Age: 35
End: 2025-04-24
Payer: COMMERCIAL

## 2025-04-24 PROCEDURE — 90837 PSYTX W PT 60 MINUTES: CPT | Mod: 95

## 2025-05-01 ENCOUNTER — APPOINTMENT (OUTPATIENT)
Dept: PSYCHIATRY | Facility: CLINIC | Age: 35
End: 2025-05-01
Payer: COMMERCIAL

## 2025-05-01 DIAGNOSIS — F43.23 ADJUSTMENT DISORDER WITH MIXED ANXIETY AND DEPRESSED MOOD: ICD-10-CM

## 2025-05-01 PROCEDURE — 90837 PSYTX W PT 60 MINUTES: CPT | Mod: 95

## 2025-05-08 ENCOUNTER — APPOINTMENT (OUTPATIENT)
Dept: PSYCHIATRY | Facility: CLINIC | Age: 35
End: 2025-05-08
Payer: COMMERCIAL

## 2025-05-08 DIAGNOSIS — F43.10 POST-TRAUMATIC STRESS DISORDER, UNSPECIFIED: ICD-10-CM

## 2025-05-08 DIAGNOSIS — F12.90 CANNABIS USE, UNSPECIFIED, UNCOMPLICATED: ICD-10-CM

## 2025-05-08 PROCEDURE — 99214 OFFICE O/P EST MOD 30 MIN: CPT | Mod: 95

## 2025-05-08 PROCEDURE — 90837 PSYTX W PT 60 MINUTES: CPT | Mod: 95

## 2025-05-15 ENCOUNTER — APPOINTMENT (OUTPATIENT)
Dept: PSYCHIATRY | Facility: CLINIC | Age: 35
End: 2025-05-15
Payer: COMMERCIAL

## 2025-05-15 DIAGNOSIS — F43.23 ADJUSTMENT DISORDER WITH MIXED ANXIETY AND DEPRESSED MOOD: ICD-10-CM

## 2025-05-15 PROCEDURE — 90837 PSYTX W PT 60 MINUTES: CPT | Mod: 95

## 2025-05-20 DIAGNOSIS — R79.89 OTHER SPECIFIED ABNORMAL FINDINGS OF BLOOD CHEMISTRY: ICD-10-CM

## 2025-05-22 ENCOUNTER — APPOINTMENT (OUTPATIENT)
Dept: PSYCHIATRY | Facility: CLINIC | Age: 35
End: 2025-05-22
Payer: COMMERCIAL

## 2025-05-22 DIAGNOSIS — F43.23 ADJUSTMENT DISORDER WITH MIXED ANXIETY AND DEPRESSED MOOD: ICD-10-CM

## 2025-05-22 PROCEDURE — 90837 PSYTX W PT 60 MINUTES: CPT | Mod: 95

## 2025-05-29 ENCOUNTER — APPOINTMENT (OUTPATIENT)
Dept: PSYCHIATRY | Facility: CLINIC | Age: 35
End: 2025-05-29
Payer: COMMERCIAL

## 2025-05-29 DIAGNOSIS — F41.9 ANXIETY DISORDER, UNSPECIFIED: ICD-10-CM

## 2025-05-29 DIAGNOSIS — F32.A DEPRESSION, UNSPECIFIED: ICD-10-CM

## 2025-05-29 PROCEDURE — 90837 PSYTX W PT 60 MINUTES: CPT | Mod: 95

## 2025-06-05 ENCOUNTER — APPOINTMENT (OUTPATIENT)
Dept: PSYCHIATRY | Facility: CLINIC | Age: 35
End: 2025-06-05
Payer: COMMERCIAL

## 2025-06-05 DIAGNOSIS — F43.10 POST-TRAUMATIC STRESS DISORDER, UNSPECIFIED: ICD-10-CM

## 2025-06-05 DIAGNOSIS — F12.90 CANNABIS USE, UNSPECIFIED, UNCOMPLICATED: ICD-10-CM

## 2025-06-05 PROCEDURE — 90837 PSYTX W PT 60 MINUTES: CPT | Mod: 95

## 2025-06-05 PROCEDURE — 99215 OFFICE O/P EST HI 40 MIN: CPT | Mod: 95

## 2025-06-05 PROCEDURE — G2211 COMPLEX E/M VISIT ADD ON: CPT | Mod: 95

## 2025-06-10 ENCOUNTER — APPOINTMENT (OUTPATIENT)
Dept: VASCULAR SURGERY | Facility: CLINIC | Age: 35
End: 2025-06-10
Payer: COMMERCIAL

## 2025-06-10 ENCOUNTER — APPOINTMENT (OUTPATIENT)
Dept: VASCULAR SURGERY | Facility: CLINIC | Age: 35
End: 2025-06-10

## 2025-06-10 VITALS
HEIGHT: 62 IN | WEIGHT: 165 LBS | SYSTOLIC BLOOD PRESSURE: 112 MMHG | DIASTOLIC BLOOD PRESSURE: 70 MMHG | HEART RATE: 78 BPM | OXYGEN SATURATION: 99 % | BODY MASS INDEX: 30.36 KG/M2

## 2025-06-10 PROCEDURE — 99204 OFFICE O/P NEW MOD 45 MIN: CPT

## 2025-06-10 PROCEDURE — 93971 EXTREMITY STUDY: CPT

## 2025-06-10 RX ORDER — CETIRIZINE HYDROCHLORIDE 10 MG/1
TABLET, FILM COATED ORAL
Refills: 0 | Status: ACTIVE | COMMUNITY

## 2025-06-11 PROBLEM — I86.8: Status: ACTIVE | Noted: 2025-06-11

## 2025-06-11 RX ORDER — ARIPIPRAZOLE 2 MG/1
2 TABLET ORAL
Qty: 30 | Refills: 0 | Status: ACTIVE | COMMUNITY
Start: 2025-06-11 | End: 1900-01-01

## 2025-06-12 ENCOUNTER — APPOINTMENT (OUTPATIENT)
Dept: PSYCHIATRY | Facility: CLINIC | Age: 35
End: 2025-06-12
Payer: COMMERCIAL

## 2025-06-12 PROCEDURE — 90837 PSYTX W PT 60 MINUTES: CPT | Mod: 95

## 2025-06-17 ENCOUNTER — APPOINTMENT (OUTPATIENT)
Age: 35
End: 2025-06-17
Payer: COMMERCIAL

## 2025-06-17 VITALS
DIASTOLIC BLOOD PRESSURE: 80 MMHG | RESPIRATION RATE: 16 BRPM | WEIGHT: 166 LBS | SYSTOLIC BLOOD PRESSURE: 120 MMHG | HEART RATE: 84 BPM | TEMPERATURE: 97.7 F | OXYGEN SATURATION: 95 % | HEIGHT: 62 IN | BODY MASS INDEX: 30.55 KG/M2

## 2025-06-17 PROCEDURE — 99395 PREV VISIT EST AGE 18-39: CPT

## 2025-06-17 PROCEDURE — G0136: CPT

## 2025-06-19 ENCOUNTER — APPOINTMENT (OUTPATIENT)
Dept: PSYCHIATRY | Facility: CLINIC | Age: 35
End: 2025-06-19
Payer: COMMERCIAL

## 2025-06-19 PROCEDURE — 90837 PSYTX W PT 60 MINUTES: CPT | Mod: 95

## 2025-06-21 LAB
25(OH)D3 SERPL-MCNC: 24 NG/ML
ALBUMIN SERPL ELPH-MCNC: 4.2 G/DL
ALP BLD-CCNC: 56 U/L
ALT SERPL-CCNC: 52 U/L
ANION GAP SERPL CALC-SCNC: 15 MMOL/L
APPEARANCE: CLEAR
AST SERPL-CCNC: 36 U/L
BACTERIA: ABNORMAL /HPF
BASOPHILS # BLD AUTO: 0.04 K/UL
BASOPHILS NFR BLD AUTO: 0.6 %
BILIRUB SERPL-MCNC: 0.7 MG/DL
BILIRUBIN URINE: NEGATIVE
BLOOD URINE: NEGATIVE
BUN SERPL-MCNC: 10 MG/DL
CALCIUM SERPL-MCNC: 9 MG/DL
CAST: 0 /LPF
CHLORIDE SERPL-SCNC: 104 MMOL/L
CHOLEST SERPL-MCNC: 159 MG/DL
CO2 SERPL-SCNC: 22 MMOL/L
COLOR: YELLOW
CREAT SERPL-MCNC: 0.88 MG/DL
EGFRCR SERPLBLD CKD-EPI 2021: 88 ML/MIN/1.73M2
EOSINOPHIL # BLD AUTO: 0.17 K/UL
EOSINOPHIL NFR BLD AUTO: 2.4 %
EPITHELIAL CELLS: 2 /HPF
ESTIMATED AVERAGE GLUCOSE: 94 MG/DL
GLUCOSE QUALITATIVE U: NEGATIVE MG/DL
GLUCOSE SERPL-MCNC: 85 MG/DL
HBA1C MFR BLD HPLC: 4.9 %
HCT VFR BLD CALC: 41.8 %
HDLC SERPL-MCNC: 88 MG/DL
HGB BLD-MCNC: 14.1 G/DL
IMM GRANULOCYTES NFR BLD AUTO: 0.3 %
KETONES URINE: NEGATIVE MG/DL
LDLC SERPL-MCNC: 59 MG/DL
LEUKOCYTE ESTERASE URINE: NEGATIVE
LYMPHOCYTES # BLD AUTO: 2.82 K/UL
LYMPHOCYTES NFR BLD AUTO: 39.9 %
MAN DIFF?: NORMAL
MCHC RBC-ENTMCNC: 31.7 PG
MCHC RBC-ENTMCNC: 33.7 G/DL
MCV RBC AUTO: 93.9 FL
MICROSCOPIC-UA: NORMAL
MONOCYTES # BLD AUTO: 0.61 K/UL
MONOCYTES NFR BLD AUTO: 8.6 %
NEUTROPHILS # BLD AUTO: 3.41 K/UL
NEUTROPHILS NFR BLD AUTO: 48.2 %
NITRITE URINE: NEGATIVE
NONHDLC SERPL-MCNC: 71 MG/DL
PH URINE: 7
PLATELET # BLD AUTO: 208 K/UL
POTASSIUM SERPL-SCNC: 4.2 MMOL/L
PROT SERPL-MCNC: 6.4 G/DL
PROTEIN URINE: NORMAL MG/DL
RBC # BLD: 4.45 M/UL
RBC # FLD: 12.6 %
RED BLOOD CELLS URINE: 2 /HPF
SODIUM SERPL-SCNC: 141 MMOL/L
SPECIFIC GRAVITY URINE: 1.02
TRIGL SERPL-MCNC: 62 MG/DL
TSH SERPL-ACNC: 1.54 UIU/ML
UROBILINOGEN URINE: 0.2 MG/DL
WBC # FLD AUTO: 7.07 K/UL
WHITE BLOOD CELLS URINE: 0 /HPF

## 2025-06-24 ENCOUNTER — APPOINTMENT (OUTPATIENT)
Dept: PSYCHIATRY | Facility: CLINIC | Age: 35
End: 2025-06-24
Payer: COMMERCIAL

## 2025-06-24 PROCEDURE — G2211 COMPLEX E/M VISIT ADD ON: CPT | Mod: 95

## 2025-06-24 PROCEDURE — 99214 OFFICE O/P EST MOD 30 MIN: CPT | Mod: 95

## 2025-06-26 ENCOUNTER — APPOINTMENT (OUTPATIENT)
Dept: PSYCHIATRY | Facility: CLINIC | Age: 35
End: 2025-06-26
Payer: COMMERCIAL

## 2025-06-26 PROCEDURE — 90837 PSYTX W PT 60 MINUTES: CPT | Mod: 95

## 2025-06-27 ENCOUNTER — APPOINTMENT (OUTPATIENT)
Dept: PLASTIC SURGERY | Facility: CLINIC | Age: 35
End: 2025-06-27

## 2025-06-27 PROCEDURE — 99213 OFFICE O/P EST LOW 20 MIN: CPT

## 2025-07-02 ENCOUNTER — APPOINTMENT (OUTPATIENT)
Dept: PLASTIC SURGERY | Facility: CLINIC | Age: 35
End: 2025-07-02

## 2025-07-03 ENCOUNTER — APPOINTMENT (OUTPATIENT)
Dept: PSYCHIATRY | Facility: CLINIC | Age: 35
End: 2025-07-03

## 2025-07-08 ENCOUNTER — OUTPATIENT (OUTPATIENT)
Dept: OUTPATIENT SERVICES | Facility: HOSPITAL | Age: 35
LOS: 1 days | End: 2025-07-08
Payer: COMMERCIAL

## 2025-07-08 VITALS
HEART RATE: 80 BPM | RESPIRATION RATE: 16 BRPM | DIASTOLIC BLOOD PRESSURE: 85 MMHG | TEMPERATURE: 99 F | WEIGHT: 172.4 LBS | OXYGEN SATURATION: 98 % | SYSTOLIC BLOOD PRESSURE: 134 MMHG | HEIGHT: 62.5 IN

## 2025-07-08 DIAGNOSIS — Z98.890 OTHER SPECIFIED POSTPROCEDURAL STATES: Chronic | ICD-10-CM

## 2025-07-08 DIAGNOSIS — N64.81 PTOSIS OF BREAST: ICD-10-CM

## 2025-07-08 DIAGNOSIS — L90.5 SCAR CONDITIONS AND FIBROSIS OF SKIN: ICD-10-CM

## 2025-07-08 DIAGNOSIS — F41.9 ANXIETY DISORDER, UNSPECIFIED: ICD-10-CM

## 2025-07-08 DIAGNOSIS — Z01.818 ENCOUNTER FOR OTHER PREPROCEDURAL EXAMINATION: ICD-10-CM

## 2025-07-08 DIAGNOSIS — G47.00 INSOMNIA, UNSPECIFIED: ICD-10-CM

## 2025-07-08 DIAGNOSIS — K63.2 FISTULA OF INTESTINE: Chronic | ICD-10-CM

## 2025-07-08 DIAGNOSIS — R45.86 EMOTIONAL LABILITY: ICD-10-CM

## 2025-07-08 DIAGNOSIS — R63.4 ABNORMAL WEIGHT LOSS: ICD-10-CM

## 2025-07-08 DIAGNOSIS — Z90.49 ACQUIRED ABSENCE OF OTHER SPECIFIED PARTS OF DIGESTIVE TRACT: Chronic | ICD-10-CM

## 2025-07-08 PROCEDURE — G0463: CPT

## 2025-07-08 RX ORDER — SODIUM CHLORIDE 9 G/1000ML
1000 INJECTION, SOLUTION INTRAVENOUS
Refills: 0 | Status: DISCONTINUED | OUTPATIENT
Start: 2025-07-16 | End: 2025-07-30

## 2025-07-08 NOTE — H&P PST ADULT - NS HP PST ANES REACTION
Ms. Keene presents today for follow-up of hypertension, dizziness and irregular heartbeat.     Recent Holter monitor ordered to evaluate symptoms of dizziness showed:  Patient rhythm was sinus with average heart rate of 67 bpm.  Minimum heart rate was 44 bpm, maximum heart rate 163 bpm.  There was no significant heart block.  Patient has 73 PACs.  Patient also had 12 narrow complex tachycardias asymptomatic the longest was 15 beats at a rate of 163 bpm.  The rest for 4-7 beats.  Patient had 178 PVCs.  There was no wide-complex tachycardia.    At her last visit her Toprol was discontinued and carvedilol was started due to bradycardia  Amlodipine was increased from 5 to 10 mg and she was encouraged to continue with lisinopril 40 mg  Today her blood pressure is and pulse  She sees electrophysiology later this month    Patient at this time has [facility]    [facility]344679}.     Patient is a long term smoker.    Lives alone but her daughter is in the area      The patient's last cholesterol level was reviewed   Cholesterol (mg/dL)   Date Value   12/03/2024 150       Nurse`s notes reviewed and accepted.    Current Outpatient Medications   Medication Sig Dispense Refill    carvedilol (COREG) 3.125 MG tablet Take 1 tablet by mouth in the morning and 1 tablet in the evening. Take with meals. 60 tablet 1    amLODIPine (NORVASC) 5 MG tablet Take 2 tablets by mouth daily.      Vibegron 75 MG Tab Take 1 tablet by mouth daily. 30 tablet 3    FLUoxetine (PROzac) 20 MG capsule TAKE 1 CAPSULE BY MOUTH TWICE  DAILY 180 capsule 3    metFORMIN (GLUCOPHAGE-XR) 500 MG 24 hr tablet Take 1 tablet by mouth daily (with breakfast). 30 tablet 1    atorvastatin (LIPITOR) 40 MG tablet Take 1 tablet by mouth daily. 90 tablet 3    imiquimod (ALDARA) 5 % cream Apply topically 3 days a week. 12 each 0    MAGNESIUM GLYCINATE PO Take 1 tablet in the morning and 2 tablets in the  evening      fluticasone (FLONASE) 50 MCG/ACT nasal spray Spray 1 spray in each nostril in the morning and 1 spray in the evening. 16 g 3    pantoprazole (PROTONIX) 40 MG tablet Take 1 tablet by mouth daily (before breakfast). 60 tablet 3    Accu-Chek FastClix Lancets Misc CHECK BLOOD SUGAR ONCE DAILY 102 each 2    tiotropium (Spiriva Respimat) 2.5 MCG/ACT inhaler Inhale 2 puffs into the lungs daily. (Patient taking differently: Inhale 2 puffs into the lungs as needed (to prevent bronchospasm).) 4 g 11    albuterol 108 (90 Base) MCG/ACT inhaler Inhale 2 puffs into the lungs every 4 to 6 hours as needed for Shortness of Breath or Wheezing. 1 each 5    lisinopril (ZESTRIL) 40 MG tablet Take 1 tablet by mouth daily. 90 tablet 3    acetaminophen (TYLENOL) 500 MG tablet Take 1 to 2 tablets by mouth every 8 hours as needed for pain. Do not exceed 3000 mg acetaminophen per 24 hours from all sources.  Indications: Pain, mild to moderate pain      aspirin (ECOTRIN) 81 MG EC tablet Take 81 mg by mouth nightly.      ferrous sulfate 325 (65 FE) MG tablet Take 325 mg by mouth every morning.      ipratropium-albuterol (DUONEB) 0.5-2.5 (3) MG/3ML nebulizer solution Take 3 mLs by nebulization every 6 hours as needed for Wheezing or Shortness of Breath. 360 mL 11    B Complex Vitamins (B COMPLEX 1 PO) Take 1 tablet by mouth every morning.      Pyridoxine HCl (vitamin B-6) 100 MG tablet Take 1 tablet by mouth daily.      Multiple Vitamins-Minerals (Centrum Adults) Tab Take 1 tablet by mouth daily. (Patient taking differently: Take 1 tablet by mouth every morning.)       No current facility-administered medications for this visit.          Objective:  There were no vitals taken for this visit.     GENERAL: Alert, no acute distress. Not agitated.   Appears oriented, pleasant and appropriate.  HEAD: Grossly normocephalic, no obvious trauma.  EYES:  Extraocular movements are intact.  Pupils equal and reactive.  Lids and conjunctivae  grossly normal.  OROPHARYNX:  {[facility] OROPHARYNX EXAM - BRIEF:467128}  NECK: {[facility] NECK EXAM - BRIEF:472776}  No supraclavicular lymphadenopathy.  LUNGS:  {[facility] LUNG EXAM:290467}  Respiratory effort appears normal/easy at rest.  CORONARY EXAM:   {[facility] HEART EXAM - BRIEF:035702}  ABDOMEN:  Soft, nontender, no mass or obvious hernia noted;  Liver/spleen do not seem to be grossly enlarged.  EXTREMITIES:  In general appears to have good strength of all four extremities.  {[facility] EXTREMITIES EXAM:508967} Gait appears grossly normal.  NEUROLOGIC:  Patient appears to be alert and oriented.  Deep tendon reflexes {[facility] NEUROLOGIC REFLEX EXAM:123463}.  {[facility] GROSS NEUROLOGIC EXAM:304444}. Appears to have grossly normal sensation.  Moves about the room easily.  SKIN: No obvious clubbing or cyanosis; No obvious rashes; warm to the touch.      ASSESSMENT:   No diagnosis found.  SEE BELOW    PLAN:  At this time I did recommend to the patient to continue to work on their exercise and diet program.  I recommend that they call if they note any significant change in their symptoms or note new symptoms such as increasing shortness of breath, chest pain, or lower extremity swelling.  At this time I would recommend {[facility] HTN RECOMMENDATIONS:879506}.   No

## 2025-07-08 NOTE — H&P PST ADULT - HISTORY OF PRESENT ILLNESS
36y/o female with medical h/o Anxiety/Depression - Mood swing and Insomnia, reports Ptosis of breast and Scar conditions and fibrosis of skin. Pt presents today for PST for scheduled for Bilateral Mastopexy and Abdominal  Scar Revision on 7/16/25

## 2025-07-08 NOTE — H&P PST ADULT - NSICDXPASTSURGICALHX_GEN_ALL_CORE_FT
PAST SURGICAL HISTORY:  Colonic fistula     H/O lumbar discectomy     H/O rectal sphincterotomy     History of cholecystectomy     S/P panniculectomy

## 2025-07-08 NOTE — H&P PST ADULT - PROBLEM SELECTOR PLAN 1
Pre-op instructions given. Pt verbalized understanding  Chlorhexidine wash instructions given  Pt instructed to HOLD all NSAIDs/Herbal supplement/Multivitamins 7 days before surgery  UCG ordered STAT day of procedure   Pending: M/C,

## 2025-07-08 NOTE — H&P PST ADULT - NSICDXPASTMEDICALHX_GEN_ALL_CORE_FT
PAST MEDICAL HISTORY:  Acne     Anxiety and depression     Insomnia     Mood swing     Panniculitis     Ptosis of breast     Scar condition and fibrosis of skin

## 2025-07-09 ENCOUNTER — TRANSCRIPTION ENCOUNTER (OUTPATIENT)
Age: 35
End: 2025-07-09

## 2025-07-10 ENCOUNTER — APPOINTMENT (OUTPATIENT)
Dept: PSYCHIATRY | Facility: CLINIC | Age: 35
End: 2025-07-10
Payer: COMMERCIAL

## 2025-07-10 PROCEDURE — 90837 PSYTX W PT 60 MINUTES: CPT | Mod: 95

## 2025-07-10 RX ORDER — CEFADROXIL 500 MG/1
500 CAPSULE ORAL TWICE DAILY
Qty: 14 | Refills: 0 | Status: ACTIVE | COMMUNITY
Start: 2025-07-10 | End: 1900-01-01

## 2025-07-10 RX ORDER — ONDANSETRON 4 MG/1
4 TABLET, ORALLY DISINTEGRATING ORAL
Qty: 9 | Refills: 0 | Status: ACTIVE | COMMUNITY
Start: 2025-07-10 | End: 1900-01-01

## 2025-07-10 RX ORDER — OXYCODONE 5 MG/1
5 TABLET ORAL
Qty: 12 | Refills: 0 | Status: ACTIVE | COMMUNITY
Start: 2025-07-10 | End: 1900-01-01

## 2025-07-16 ENCOUNTER — OUTPATIENT (OUTPATIENT)
Dept: OUTPATIENT SERVICES | Facility: HOSPITAL | Age: 35
LOS: 1 days | End: 2025-07-16
Payer: COMMERCIAL

## 2025-07-16 ENCOUNTER — TRANSCRIPTION ENCOUNTER (OUTPATIENT)
Age: 35
End: 2025-07-16

## 2025-07-16 ENCOUNTER — APPOINTMENT (OUTPATIENT)
Dept: PLASTIC SURGERY | Facility: HOSPITAL | Age: 35
End: 2025-07-16

## 2025-07-16 ENCOUNTER — RESULT REVIEW (OUTPATIENT)
Age: 35
End: 2025-07-16

## 2025-07-16 ENCOUNTER — APPOINTMENT (OUTPATIENT)
Dept: PLASTIC SURGERY | Facility: HOSPITAL | Age: 35
End: 2025-07-16
Payer: SELF-PAY

## 2025-07-16 VITALS
WEIGHT: 172.4 LBS | RESPIRATION RATE: 14 BRPM | HEIGHT: 62.5 IN | TEMPERATURE: 98 F | HEART RATE: 81 BPM | DIASTOLIC BLOOD PRESSURE: 77 MMHG | SYSTOLIC BLOOD PRESSURE: 117 MMHG | OXYGEN SATURATION: 97 %

## 2025-07-16 VITALS
RESPIRATION RATE: 16 BRPM | TEMPERATURE: 98 F | HEART RATE: 79 BPM | SYSTOLIC BLOOD PRESSURE: 117 MMHG | DIASTOLIC BLOOD PRESSURE: 83 MMHG | OXYGEN SATURATION: 96 %

## 2025-07-16 DIAGNOSIS — R63.4 ABNORMAL WEIGHT LOSS: ICD-10-CM

## 2025-07-16 DIAGNOSIS — L90.5 SCAR CONDITIONS AND FIBROSIS OF SKIN: ICD-10-CM

## 2025-07-16 DIAGNOSIS — K63.2 FISTULA OF INTESTINE: Chronic | ICD-10-CM

## 2025-07-16 DIAGNOSIS — N64.81 PTOSIS OF BREAST: ICD-10-CM

## 2025-07-16 DIAGNOSIS — Z98.890 OTHER SPECIFIED POSTPROCEDURAL STATES: Chronic | ICD-10-CM

## 2025-07-16 DIAGNOSIS — Z90.49 ACQUIRED ABSENCE OF OTHER SPECIFIED PARTS OF DIGESTIVE TRACT: Chronic | ICD-10-CM

## 2025-07-16 PROCEDURE — 88305 TISSUE EXAM BY PATHOLOGIST: CPT | Mod: 26

## 2025-07-16 PROCEDURE — C9399: CPT

## 2025-07-16 PROCEDURE — 19316 MASTOPEXY: CPT | Mod: 50

## 2025-07-16 PROCEDURE — 19316 MASTOPEXY: CPT

## 2025-07-16 RX ORDER — TRAZODONE HCL 100 MG
0 TABLET ORAL
Refills: 0 | DISCHARGE

## 2025-07-16 RX ORDER — HYDROMORPHONE/SOD CHLOR,ISO/PF 2 MG/10 ML
0.5 SYRINGE (ML) INJECTION
Refills: 0 | Status: DISCONTINUED | OUTPATIENT
Start: 2025-07-16 | End: 2025-07-16

## 2025-07-16 RX ORDER — ONDANSETRON HCL/PF 4 MG/2 ML
4 VIAL (ML) INJECTION ONCE
Refills: 0 | Status: DISCONTINUED | OUTPATIENT
Start: 2025-07-16 | End: 2025-07-16

## 2025-07-16 RX ORDER — OXYCODONE HYDROCHLORIDE 30 MG/1
5 TABLET ORAL ONCE
Refills: 0 | Status: DISCONTINUED | OUTPATIENT
Start: 2025-07-16 | End: 2025-07-16

## 2025-07-16 RX ORDER — BUPROPION HYDROBROMIDE 522 MG/1
1 TABLET, EXTENDED RELEASE ORAL
Refills: 0 | DISCHARGE

## 2025-07-16 RX ORDER — ARIPIPRAZOLE 2 MG/1
1 TABLET ORAL
Refills: 0 | DISCHARGE

## 2025-07-16 RX ADMIN — OXYCODONE HYDROCHLORIDE 5 MILLIGRAM(S): 30 TABLET ORAL at 17:33

## 2025-07-16 RX ADMIN — OXYCODONE HYDROCHLORIDE 5 MILLIGRAM(S): 30 TABLET ORAL at 18:15

## 2025-07-16 RX ADMIN — Medication 0.5 MILLIGRAM(S): at 15:50

## 2025-07-16 RX ADMIN — SODIUM CHLORIDE 50 MILLILITER(S): 9 INJECTION, SOLUTION INTRAVENOUS at 09:44

## 2025-07-16 NOTE — ASU DISCHARGE PLAN (ADULT/PEDIATRIC) - ASU DC SPECIAL INSTRUCTIONSFT
Keep dressings dry and in place for 48 hours. At that time, ok to shower with back facing the water. If your surgical site becomes wet, pat dry. Do not scrub.  Antibiotics have been sent to your pharmacy. Please take as prescribed.  For pain, take Tylenol every 6 hours. For pain not controlled by Tylenol, oxycodone has been sent to your pharmacy.  No heavy lifting of strenuous activity.    WOUND CARE:  Please keep incisions clean and dry. Please do not Scrub or rub incisions. Do not use lotion or powder on incisions.   BATHING: Please do not submerge wound underwater. You may shower and/or sponge bathe.  ACTIVITY: No heavy lifting or straining. Otherwise, you may return to your usual level of physical activity. If you are taking narcotic pain medication (such as Percocet) DO NOT drive a car, operate machinery or make important decisions.  DIET: Return to your usual diet.  NOTIFY YOUR SURGEON IF: You have any bleeding that does not stop, any pus draining from your wound(s), any fever (over 100.4 F) or chills, persistent nausea/vomiting, persistent diarrhea, or if your pain is not controlled on your discharge pain medications.  FOLLOW-UP: Please follow up with your primary care physician in one week regarding your

## 2025-07-16 NOTE — ASU DISCHARGE PLAN (ADULT/PEDIATRIC) - CARE PROVIDER_API CALL
Shikowitz-Behr, Lauren Beth ()  Plastic Surgery  [address], [address]  [place] [address]  Phone: [phone]  Fax: [phone]  Follow Up Time:

## 2025-07-16 NOTE — ASU PATIENT PROFILE, ADULT - ALCOHOL USE HISTORY SINGLE SELECT
----- Message from Alison Carpenter LPN sent at 6/18/2019 11:37 AM CDT -----  Please call and tell pt we had to switch her to the nurse schedule today for urine sample drop off due to araceli having to leave. She can drop it off whenever she wants to today. Thank you   yes...

## 2025-07-16 NOTE — ASU DISCHARGE PLAN (ADULT/PEDIATRIC) - FINANCIAL ASSISTANCE
[facility] provides services at a reduced cost to those who are determined to be eligible through [facility]’s financial assistance program. Information regarding [facility]’s financial assistance program can be found by going to [email or web address] or by calling [phone].

## 2025-07-17 ENCOUNTER — TRANSCRIPTION ENCOUNTER (OUTPATIENT)
Age: 35
End: 2025-07-17

## 2025-07-17 ENCOUNTER — APPOINTMENT (OUTPATIENT)
Dept: PSYCHIATRY | Facility: CLINIC | Age: 35
End: 2025-07-17
Payer: COMMERCIAL

## 2025-07-17 PROBLEM — N64.81 PTOSIS OF BREAST: Chronic | Status: ACTIVE | Noted: 2025-07-08

## 2025-07-17 PROBLEM — L90.5 SCAR CONDITIONS AND FIBROSIS OF SKIN: Chronic | Status: ACTIVE | Noted: 2025-07-08

## 2025-07-17 PROBLEM — F41.9 ANXIETY DISORDER, UNSPECIFIED: Chronic | Status: ACTIVE | Noted: 2025-07-08

## 2025-07-17 PROBLEM — R45.86 EMOTIONAL LABILITY: Chronic | Status: ACTIVE | Noted: 2025-07-08

## 2025-07-17 PROCEDURE — 90837 PSYTX W PT 60 MINUTES: CPT | Mod: 95

## 2025-07-22 ENCOUNTER — APPOINTMENT (OUTPATIENT)
Dept: PSYCHIATRY | Facility: CLINIC | Age: 35
End: 2025-07-22
Payer: COMMERCIAL

## 2025-07-22 DIAGNOSIS — F43.10 POST-TRAUMATIC STRESS DISORDER, UNSPECIFIED: ICD-10-CM

## 2025-07-22 DIAGNOSIS — F12.90 CANNABIS USE, UNSPECIFIED, UNCOMPLICATED: ICD-10-CM

## 2025-07-22 DIAGNOSIS — F32.A DEPRESSION, UNSPECIFIED: ICD-10-CM

## 2025-07-22 PROCEDURE — 99214 OFFICE O/P EST MOD 30 MIN: CPT | Mod: 95

## 2025-07-22 PROCEDURE — G2211 COMPLEX E/M VISIT ADD ON: CPT | Mod: 95

## 2025-07-22 PROCEDURE — 90833 PSYTX W PT W E/M 30 MIN: CPT | Mod: 95

## 2025-07-22 RX ORDER — ACETAMINOPHEN 500 MG/1
500 TABLET ORAL
Refills: 0 | Status: ACTIVE | COMMUNITY
Start: 2025-07-22

## 2025-07-24 LAB — SURGICAL PATHOLOGY STUDY: SIGNIFICANT CHANGE UP

## 2025-07-25 ENCOUNTER — APPOINTMENT (OUTPATIENT)
Dept: PSYCHIATRY | Facility: CLINIC | Age: 35
End: 2025-07-25
Payer: COMMERCIAL

## 2025-07-25 ENCOUNTER — APPOINTMENT (OUTPATIENT)
Dept: PLASTIC SURGERY | Facility: CLINIC | Age: 35
End: 2025-07-25
Payer: COMMERCIAL

## 2025-07-25 PROCEDURE — 90837 PSYTX W PT 60 MINUTES: CPT | Mod: 95

## 2025-07-25 PROCEDURE — 99024 POSTOP FOLLOW-UP VISIT: CPT

## 2025-08-01 ENCOUNTER — APPOINTMENT (OUTPATIENT)
Dept: PLASTIC SURGERY | Facility: CLINIC | Age: 35
End: 2025-08-01
Payer: COMMERCIAL

## 2025-08-01 DIAGNOSIS — N64.81 PTOSIS OF BREAST: ICD-10-CM

## 2025-08-01 DIAGNOSIS — L90.5 SCAR CONDITIONS AND FIBROSIS OF SKIN: ICD-10-CM

## 2025-08-01 DIAGNOSIS — R63.4 ABNORMAL WEIGHT LOSS: ICD-10-CM

## 2025-08-01 PROCEDURE — 99024 POSTOP FOLLOW-UP VISIT: CPT

## 2025-08-08 ENCOUNTER — APPOINTMENT (OUTPATIENT)
Dept: PSYCHIATRY | Facility: CLINIC | Age: 35
End: 2025-08-08
Payer: COMMERCIAL

## 2025-08-08 PROCEDURE — 90837 PSYTX W PT 60 MINUTES: CPT | Mod: 95

## 2025-08-15 ENCOUNTER — APPOINTMENT (OUTPATIENT)
Dept: PSYCHIATRY | Facility: CLINIC | Age: 35
End: 2025-08-15
Payer: COMMERCIAL

## 2025-08-15 DIAGNOSIS — F43.23 ADJUSTMENT DISORDER WITH MIXED ANXIETY AND DEPRESSED MOOD: ICD-10-CM

## 2025-08-15 PROCEDURE — 90837 PSYTX W PT 60 MINUTES: CPT | Mod: 95

## 2025-08-22 ENCOUNTER — APPOINTMENT (OUTPATIENT)
Dept: PSYCHIATRY | Facility: CLINIC | Age: 35
End: 2025-08-22
Payer: COMMERCIAL

## 2025-08-22 ENCOUNTER — APPOINTMENT (OUTPATIENT)
Dept: PLASTIC SURGERY | Facility: CLINIC | Age: 35
End: 2025-08-22

## 2025-08-22 DIAGNOSIS — F43.23 ADJUSTMENT DISORDER WITH MIXED ANXIETY AND DEPRESSED MOOD: ICD-10-CM

## 2025-08-22 DIAGNOSIS — R63.4 ABNORMAL WEIGHT LOSS: ICD-10-CM

## 2025-08-22 DIAGNOSIS — L90.5 SCAR CONDITIONS AND FIBROSIS OF SKIN: ICD-10-CM

## 2025-08-22 DIAGNOSIS — N64.81 PTOSIS OF BREAST: ICD-10-CM

## 2025-08-22 PROCEDURE — 99024 POSTOP FOLLOW-UP VISIT: CPT

## 2025-08-22 PROCEDURE — 90837 PSYTX W PT 60 MINUTES: CPT | Mod: 95

## 2025-08-29 ENCOUNTER — APPOINTMENT (OUTPATIENT)
Dept: PSYCHIATRY | Facility: CLINIC | Age: 35
End: 2025-08-29

## 2025-09-05 ENCOUNTER — APPOINTMENT (OUTPATIENT)
Dept: PSYCHIATRY | Facility: CLINIC | Age: 35
End: 2025-09-05
Payer: COMMERCIAL

## 2025-09-05 DIAGNOSIS — F43.23 ADJUSTMENT DISORDER WITH MIXED ANXIETY AND DEPRESSED MOOD: ICD-10-CM

## 2025-09-05 PROCEDURE — 90837 PSYTX W PT 60 MINUTES: CPT | Mod: 95

## 2025-09-12 ENCOUNTER — APPOINTMENT (OUTPATIENT)
Dept: PSYCHIATRY | Facility: CLINIC | Age: 35
End: 2025-09-12
Payer: COMMERCIAL

## 2025-09-12 DIAGNOSIS — F43.23 ADJUSTMENT DISORDER WITH MIXED ANXIETY AND DEPRESSED MOOD: ICD-10-CM

## 2025-09-12 PROCEDURE — 90837 PSYTX W PT 60 MINUTES: CPT | Mod: 95

## 2025-09-19 ENCOUNTER — APPOINTMENT (OUTPATIENT)
Dept: PSYCHIATRY | Facility: CLINIC | Age: 35
End: 2025-09-19
Payer: COMMERCIAL

## 2025-09-19 DIAGNOSIS — F43.23 ADJUSTMENT DISORDER WITH MIXED ANXIETY AND DEPRESSED MOOD: ICD-10-CM

## 2025-09-19 PROCEDURE — 90837 PSYTX W PT 60 MINUTES: CPT | Mod: 95

## (undated) DEVICE — DRSG DERMABOND PRINEO 22CM

## (undated) DEVICE — INVUITY ILLUMINATOR EIGR WAVEGUIDE, WIDE/FLAT DISP

## (undated) DEVICE — SUT TICRON 1 30" GS-21

## (undated) DEVICE — FOLEY TRAY 16FR 5CC LF UMETER CLOSED

## (undated) DEVICE — DRAPE SPLIT SHEET 77" X 108"

## (undated) DEVICE — SUT MERSILENE 0 30" CT-1

## (undated) DEVICE — DRAPE WARMING SOLUTION 44 X 44"

## (undated) DEVICE — DRSG CURITY GAUZE SPONGE 4 X 4" 12-PLY

## (undated) DEVICE — SPECIMEN CONTAINER 100ML

## (undated) DEVICE — SUT MONOSOF 3-0 18" P-12

## (undated) DEVICE — ABDOMINAL BINDER MED/LG 9" X 36"-64"

## (undated) DEVICE — SUT MONOCRYL 3-0 27" PS-2 UNDYED

## (undated) DEVICE — DRSG BIOPATCH DISK W CHG 1" W 4.0MM HOLE

## (undated) DEVICE — LAP PAD 18 X 18"

## (undated) DEVICE — PACK BASIN SPECIAL PROCEDURE

## (undated) DEVICE — DRSG DERMABOND 0.7ML

## (undated) DEVICE — DRAPE TOWEL BLUE 17" X 24"

## (undated) DEVICE — DRAPE 1/2 SHEET 40X57"

## (undated) DEVICE — SUT VLOC 180 3-0 18" P-12 UNDYED

## (undated) DEVICE — DRSG BIOPATCH DISK W CHG 1" W 7.0MM HOLE

## (undated) DEVICE — DRAIN BLAKE 15FR BARD CHANNEL

## (undated) DEVICE — ELCTR BOVIE PENCIL SMOKE EVACUATION

## (undated) DEVICE — DRSG DERMABOND PRINEO 60CM

## (undated) DEVICE — Device

## (undated) DEVICE — SOL IRR POUR NS 0.9% 1000ML

## (undated) DEVICE — SUT POLYSORB 3-0 30" V-20 UNDYED

## (undated) DEVICE — ELCTR BOVIE TIP BLADE INSULATED 6.5" EDGE

## (undated) DEVICE — DRAPE 3/4 SHEET W REINFORCEMENT 56X77"

## (undated) DEVICE — VENODYNE/SCD SLEEVE CALF MEDIUM

## (undated) DEVICE — DRSG MAMMARY SUPPORT LG SIZE 4

## (undated) DEVICE — STAPLER SKIN VISI-STAT 35 WIDE

## (undated) DEVICE — SOL IRR POUR H2O 250ML

## (undated) DEVICE — VISITEC 4X4

## (undated) DEVICE — DRSG STERISTRIPS 0.5 X 4"

## (undated) DEVICE — ELCTR BOVIE TIP BLADE INSULATED 2.75" EDGE

## (undated) DEVICE — DRSG MAMMARY SUPPORT MED SIZE 2

## (undated) DEVICE — TUBING INFILTRATION

## (undated) DEVICE — POSITIONER FOAM EGG CRATE ULNAR 2PCS (PINK)

## (undated) DEVICE — SUT SOFSILK 2-0 18" C-15

## (undated) DEVICE — TUBING MICROAIRE ASPIRATION SET 12FT

## (undated) DEVICE — STOPCOCK 4-WAY W SWIVEL MALE LUER LOCK NON VENTED RED CAP

## (undated) DEVICE — DRSG TOPIFOAM

## (undated) DEVICE — DRSG TEGADERM 1.75X1.75"

## (undated) DEVICE — BAG DECANTER 2

## (undated) DEVICE — PREP CHLORAPREP HI-LITE ORANGE 26ML

## (undated) DEVICE — FOLEY TRAY 16FR 5CC LTX UMETER CLOSED

## (undated) DEVICE — SUT POLYSORB 2-0 30" V-20 UNDYED

## (undated) DEVICE — NDL HYPO SAFE 22G X 1.5" (BLACK)

## (undated) DEVICE — GLV 7 PROTEXIS (BLUE)

## (undated) DEVICE — BLADE SCALPEL SAFETYLOCK #15

## (undated) DEVICE — DRSG MAMMARY SUPPORT XL SIZE 5

## (undated) DEVICE — DRAIN RESERVOIR FOR JACKSON PRATT 100CC CARDINAL

## (undated) DEVICE — WARMING BLANKET UPPER ADULT

## (undated) DEVICE — DRSG COMBINE 5X9"

## (undated) DEVICE — ELCTR GROUNDING PAD ADULT COVIDIEN

## (undated) DEVICE — BLADE SCALPEL SAFETYLOCK #10

## (undated) DEVICE — NDL COUNTER FOAM AND MAGNET 40-70

## (undated) DEVICE — WOUND IRR SURGIPHOR

## (undated) DEVICE — DRAPE GYN W LEGGINGS 3X125"

## (undated) DEVICE — DRSG MAMMARY SUPPORT MED SIZE 3

## (undated) DEVICE — PACK MAJOR ABDOMINAL WITH LAP

## (undated) DEVICE — GLV 6.5 PROTEXIS (BLUE)

## (undated) DEVICE — ABDOMINAL BINDER MED/LG 12" X 45"-62"

## (undated) DEVICE — PACK BREAST MAJOR

## (undated) DEVICE — FOLEY TRAY 16FR LF URINE METER SURESTEP

## (undated) DEVICE — DRSG COMBINE 5 X 9"

## (undated) DEVICE — SUT POLYSORB 2-0 30" GS-21 UNDYED

## (undated) DEVICE — PREP BETADINE KIT

## (undated) DEVICE — SYR LUER LOK 50CC

## (undated) DEVICE — DRAPE IOBAN 23" X 23"

## (undated) DEVICE — BOWL STERILE 32OZ BLUE

## (undated) DEVICE — VENODYNE/SCD SLEEVE CALF LARGE

## (undated) DEVICE — MARKING PEN WRITESITE PLUS

## (undated) DEVICE — MARKING PEN W RULER

## (undated) DEVICE — WARMING BLANKET LOWER ADULT

## (undated) DEVICE — PACK MINOR

## (undated) DEVICE — SYR LUER LOK 20CC

## (undated) DEVICE — SUT VLOC 180 3-0 6" P-12 UNDYED

## (undated) DEVICE — SUT MONOSOF 2-0 18" C-15

## (undated) DEVICE — SUT MONOCRYL 2-0 27" UR-6

## (undated) DEVICE — DRSG MAMMARY SUPPORT SM SIZE 1

## (undated) DEVICE — BULB SYRINGE 2OZ (BLUE)

## (undated) DEVICE — SUT NYLON 2-0 18" FS

## (undated) DEVICE — LIGASURE IMPACT

## (undated) DEVICE — GLV 6.5 PROTEXIS (WHITE)

## (undated) DEVICE — SUT PLAIN GUT FAST ABSORBING 5-0 PC-1

## (undated) DEVICE — SUT MONOCRYL 4-0 27" PS-2 UNDYED

## (undated) DEVICE — SOL IRR POUR NS 0.9% 500ML

## (undated) DEVICE — DRAPE INSTRUMENT POUCH 6.75" X 11"

## (undated) DEVICE — DRSG XEROFORM 5 X 9"